# Patient Record
Sex: FEMALE | Race: WHITE | NOT HISPANIC OR LATINO | Employment: UNEMPLOYED | URBAN - METROPOLITAN AREA
[De-identification: names, ages, dates, MRNs, and addresses within clinical notes are randomized per-mention and may not be internally consistent; named-entity substitution may affect disease eponyms.]

---

## 2017-04-16 PROCEDURE — 87077 CULTURE AEROBIC IDENTIFY: CPT | Performed by: FAMILY MEDICINE

## 2017-04-16 PROCEDURE — 87186 SC STD MICRODIL/AGAR DIL: CPT | Performed by: FAMILY MEDICINE

## 2017-04-16 PROCEDURE — 87086 URINE CULTURE/COLONY COUNT: CPT | Performed by: FAMILY MEDICINE

## 2017-04-16 PROCEDURE — 87147 CULTURE TYPE IMMUNOLOGIC: CPT | Performed by: FAMILY MEDICINE

## 2017-04-19 ENCOUNTER — TELEPHONE (OUTPATIENT)
Dept: URGENT CARE | Facility: CLINIC | Age: 57
End: 2017-04-19

## 2017-04-19 NOTE — TELEPHONE ENCOUNTER
Pt called for results of urine culture.  Discussed culture with pt and informed that there was no C&S because growth was skin contaminate.  She reports that her doctor, Earlene Ortiz wanted to verify she was on the correct antibiotics. Offered for patient to return to [facility] for repeat UA and culture, she declined.   States she will call her doctor and have results sent to her.

## 2017-04-20 ENCOUNTER — TELEPHONE (OUTPATIENT)
Dept: URGENT CARE | Facility: CLINIC | Age: 57
End: 2017-04-20

## 2017-06-14 ENCOUNTER — OFFICE VISIT (OUTPATIENT)
Dept: INTERNAL MEDICINE | Facility: CLINIC | Age: 57
End: 2017-06-14

## 2017-06-14 VITALS
HEIGHT: 65 IN | RESPIRATION RATE: 16 BRPM | BODY MASS INDEX: 29.99 KG/M2 | HEART RATE: 70 BPM | DIASTOLIC BLOOD PRESSURE: 76 MMHG | WEIGHT: 180 LBS | TEMPERATURE: 97.2 F | SYSTOLIC BLOOD PRESSURE: 118 MMHG

## 2017-06-14 DIAGNOSIS — C50.912 MALIGNANT NEOPLASM OF LEFT FEMALE BREAST, UNSPECIFIED SITE OF BREAST: ICD-10-CM

## 2017-06-14 DIAGNOSIS — Z13.6 SCREENING FOR CARDIOVASCULAR CONDITION: ICD-10-CM

## 2017-06-14 DIAGNOSIS — J30.2 SEASONAL ALLERGIC RHINITIS, UNSPECIFIED ALLERGIC RHINITIS TRIGGER: ICD-10-CM

## 2017-06-14 DIAGNOSIS — I10 ESSENTIAL HYPERTENSION: ICD-10-CM

## 2017-06-14 DIAGNOSIS — K21.9 GASTROESOPHAGEAL REFLUX DISEASE WITHOUT ESOPHAGITIS: ICD-10-CM

## 2017-06-14 DIAGNOSIS — K63.5 BENIGN COLONIC POLYP: ICD-10-CM

## 2017-06-14 DIAGNOSIS — Z11.59 NEED FOR HEPATITIS C SCREENING TEST: ICD-10-CM

## 2017-06-14 DIAGNOSIS — Z00.00 ENCOUNTER FOR HEALTH MAINTENANCE EXAMINATION IN ADULT: Primary | ICD-10-CM

## 2017-06-14 DIAGNOSIS — K58.9 IRRITABLE BOWEL SYNDROME WITHOUT DIARRHEA: ICD-10-CM

## 2017-06-14 LAB
25(OH)D3 SERPL-MCNC: 25.3 NG/ML
ALBUMIN SERPL-MCNC: 4.8 G/DL (ref 3.2–4.8)
ALBUMIN/GLOB SERPL: 1.8 G/DL (ref 1.5–2.5)
ALP SERPL-CCNC: 57 U/L (ref 25–100)
ALT SERPL W P-5'-P-CCNC: 20 U/L (ref 7–40)
ANION GAP SERPL CALCULATED.3IONS-SCNC: 5 MMOL/L (ref 3–11)
ARTICHOKE IGE QN: 129 MG/DL (ref 0–130)
AST SERPL-CCNC: 33 U/L (ref 0–33)
BASOPHILS # BLD AUTO: 0.05 10*3/MM3 (ref 0–0.2)
BASOPHILS NFR BLD AUTO: 1 % (ref 0–1)
BILIRUB SERPL-MCNC: 0.6 MG/DL (ref 0.3–1.2)
BUN BLD-MCNC: 17 MG/DL (ref 9–23)
BUN/CREAT SERPL: 24.3 (ref 7–25)
CALCIUM SPEC-SCNC: 10.1 MG/DL (ref 8.7–10.4)
CHLORIDE SERPL-SCNC: 104 MMOL/L (ref 99–109)
CHOLEST SERPL-MCNC: 235 MG/DL (ref 0–200)
CLARITY, POC: ABNORMAL
CO2 SERPL-SCNC: 31 MMOL/L (ref 20–31)
COLOR UR: YELLOW
CREAT BLD-MCNC: 0.7 MG/DL (ref 0.6–1.3)
DEPRECATED RDW RBC AUTO: 46.4 FL (ref 37–54)
EOSINOPHIL # BLD AUTO: 0.16 10*3/MM3 (ref 0.1–0.3)
EOSINOPHIL NFR BLD AUTO: 3.1 % (ref 0–3)
ERYTHROCYTE [DISTWIDTH] IN BLOOD BY AUTOMATED COUNT: 13 % (ref 11.3–14.5)
EXPIRATION DATE: ABNORMAL
GFR SERPL CREATININE-BSD FRML MDRD: 87 ML/MIN/1.73
GLOBULIN UR ELPH-MCNC: 2.7 GM/DL
GLUCOSE BLD-MCNC: 114 MG/DL (ref 70–100)
GLUCOSE UR STRIP-MCNC: NEGATIVE MG/DL
HCT VFR BLD AUTO: 40.2 % (ref 34.5–44)
HCV AB SER DONR QL: NORMAL
HDLC SERPL-MCNC: 71 MG/DL (ref 40–60)
HGB BLD-MCNC: 13.1 G/DL (ref 11.5–15.5)
IMM GRANULOCYTES # BLD: 0.02 10*3/MM3 (ref 0–0.03)
IMM GRANULOCYTES NFR BLD: 0.4 % (ref 0–0.6)
KETONES UR QL: NEGATIVE
LEUKOCYTE EST, POC: ABNORMAL
LYMPHOCYTES # BLD AUTO: 1.19 10*3/MM3 (ref 0.6–4.8)
LYMPHOCYTES NFR BLD AUTO: 23.2 % (ref 24–44)
Lab: ABNORMAL
MCH RBC QN AUTO: 31.7 PG (ref 27–31)
MCHC RBC AUTO-ENTMCNC: 32.6 G/DL (ref 32–36)
MCV RBC AUTO: 97.3 FL (ref 80–99)
MONOCYTES # BLD AUTO: 0.4 10*3/MM3 (ref 0–1)
MONOCYTES NFR BLD AUTO: 7.8 % (ref 0–12)
NEUTROPHILS # BLD AUTO: 3.31 10*3/MM3 (ref 1.5–8.3)
NEUTROPHILS NFR BLD AUTO: 64.5 % (ref 41–71)
NITRITE UR-MCNC: NEGATIVE MG/ML
PH UR: 8.5 [PH] (ref 5–8)
PLAT MORPH BLD: NORMAL
PLATELET # BLD AUTO: 305 10*3/MM3 (ref 150–450)
PMV BLD AUTO: 11.2 FL (ref 6–12)
POTASSIUM BLD-SCNC: 5 MMOL/L (ref 3.5–5.5)
PROT SERPL-MCNC: 7.5 G/DL (ref 5.7–8.2)
PROT UR STRIP-MCNC: NEGATIVE MG/DL
PROT/CREAT UR: 200 MG/G CREA
RBC # BLD AUTO: 4.13 10*6/MM3 (ref 3.89–5.14)
RBC # UR STRIP: NEGATIVE /UL
RBC MORPH BLD: NORMAL
SODIUM BLD-SCNC: 140 MMOL/L (ref 132–146)
SP GR UR: 1.01 (ref 1–1.03)
TRIGL SERPL-MCNC: 168 MG/DL (ref 0–150)
TSH SERPL DL<=0.05 MIU/L-ACNC: 3 MIU/ML (ref 0.35–5.35)
WBC MORPH BLD: NORMAL
WBC NRBC COR # BLD: 5.13 10*3/MM3 (ref 3.5–10.8)

## 2017-06-14 PROCEDURE — 80053 COMPREHEN METABOLIC PANEL: CPT | Performed by: INTERNAL MEDICINE

## 2017-06-14 PROCEDURE — 80061 LIPID PANEL: CPT | Performed by: INTERNAL MEDICINE

## 2017-06-14 PROCEDURE — 86803 HEPATITIS C AB TEST: CPT | Performed by: INTERNAL MEDICINE

## 2017-06-14 PROCEDURE — 36415 COLL VENOUS BLD VENIPUNCTURE: CPT | Performed by: INTERNAL MEDICINE

## 2017-06-14 PROCEDURE — 82306 VITAMIN D 25 HYDROXY: CPT | Performed by: INTERNAL MEDICINE

## 2017-06-14 PROCEDURE — 84443 ASSAY THYROID STIM HORMONE: CPT | Performed by: INTERNAL MEDICINE

## 2017-06-14 PROCEDURE — 85025 COMPLETE CBC W/AUTO DIFF WBC: CPT | Performed by: INTERNAL MEDICINE

## 2017-06-14 PROCEDURE — 81002 URINALYSIS NONAUTO W/O SCOPE: CPT | Performed by: INTERNAL MEDICINE

## 2017-06-14 PROCEDURE — 85007 BL SMEAR W/DIFF WBC COUNT: CPT | Performed by: INTERNAL MEDICINE

## 2017-06-14 PROCEDURE — 99396 PREV VISIT EST AGE 40-64: CPT | Performed by: INTERNAL MEDICINE

## 2017-06-14 RX ORDER — LISINOPRIL 10 MG/1
10 TABLET ORAL DAILY
Qty: 90 TABLET | Refills: 3 | Status: SHIPPED | OUTPATIENT
Start: 2017-06-14 | End: 2018-06-25 | Stop reason: SDUPTHER

## 2017-06-14 RX ORDER — MONTELUKAST SODIUM 10 MG/1
10 TABLET ORAL DAILY
Qty: 90 TABLET | Refills: 3 | Status: SHIPPED | OUTPATIENT
Start: 2017-06-14 | End: 2018-06-27 | Stop reason: SDUPTHER

## 2017-06-14 RX ORDER — FLUOXETINE HYDROCHLORIDE 20 MG/1
20 CAPSULE ORAL DAILY
Qty: 90 EACH | Refills: 3 | Status: SHIPPED | OUTPATIENT
Start: 2017-06-14 | End: 2018-06-25 | Stop reason: SDUPTHER

## 2017-06-14 NOTE — PATIENT INSTRUCTIONS
Recommend continue PT, NSAIDS as needed, ice as needed, and start Glucosamine Chondroitin.    The patient was given an information brochure on Zostavax and will call back to have it done if covered by insurance.    The patient has been contacted by Dr. Lozano, and she agrees to schedule her Colonoscopy.    Health Maintenance, Female  Adopting a healthy lifestyle and getting preventive care can go a long way to promote health and wellness. Talk with your health care provider about what schedule of regular examinations is right for you. This is a good chance for you to check in with your provider about disease prevention and staying healthy.  In between checkups, there are plenty of things you can do on your own. Experts have done a lot of research about which lifestyle changes and preventive measures are most likely to keep you healthy. Ask your health care provider for more information.  WEIGHT AND DIET   Eat a healthy diet  · Be sure to include plenty of vegetables, fruits, low-fat dairy products, and lean protein.  · Do not eat a lot of foods high in solid fats, added sugars, or salt.  · Get regular exercise. This is one of the most important things you can do for your health.  ¨ Most adults should exercise for at least 150 minutes each week. The exercise should increase your heart rate and make you sweat (moderate-intensity exercise).  ¨ Most adults should also do strengthening exercises at least twice a week. This is in addition to the moderate-intensity exercise.    Maintain a healthy weight  · Body mass index (BMI) is a measurement that can be used to identify possible weight problems. It estimates body fat based on height and weight. Your health care provider can help determine your BMI and help you achieve or maintain a healthy weight.  · For females 20 years of age and older:      A BMI below 18.5 is considered underweight.    A BMI of 18.5 to 24.9 is normal.    A BMI of 25 to 29.9 is considered overweight.     A BMI of 30 and above is considered obese.    Watch levels of cholesterol and blood lipids  · You should start having your blood tested for lipids and cholesterol at 20 years of age, then have this test every 5 years.  · You may need to have your cholesterol levels checked more often if:  ¨ Your lipid or cholesterol levels are high.  ¨ You are older than 50 years of age.  ¨ You are at high risk for heart disease.    CANCER SCREENING      Lung Cancer  · Lung cancer screening is recommended for adults 55-80 years old who are at high risk for lung cancer because of a history of smoking.  · A yearly low-dose CT scan of the lungs is recommended for people who:  ¨ Currently smoke.  ¨ Have quit within the past 15 years.  ¨ Have at least a 30-pack-year history of smoking. A pack year is smoking an average of one pack of cigarettes a day for 1 year.  · Yearly screening should continue until it has been 15 years since you quit.  · Yearly screening should stop if you develop a health problem that would prevent you from having lung cancer treatment.    Breast Cancer  · Practice breast self-awareness. This means understanding how your breasts normally appear and feel.  · It also means doing regular breast self-exams. Let your health care provider know about any changes, no matter how small.  · If you are in your 20s or 30s, you should have a clinical breast exam (CBE) by a health care provider every 1-3 years as part of a regular health exam.  · If you are 40 or older, have a CBE every year. Also consider having a breast X-ray (mammogram) every year.  · If you have a family history of breast cancer, talk to your health care provider about genetic screening.  · If you are at high risk for breast cancer, talk to your health care provider about having an MRI and a mammogram every year.  · Breast cancer gene (BRCA) assessment is recommended for women who have family members with BRCA-related cancers. BRCA-related cancers  include:  ¨ Breast.  ¨ Ovarian.  ¨ Tubal.  ¨ Peritoneal cancers.  · Results of the assessment will determine the need for genetic counseling and BRCA1 and BRCA2 testing.  Cervical Cancer  Your health care provider may recommend that you be screened regularly for cancer of the pelvic organs (ovaries, uterus, and vagina). This screening involves a pelvic examination, including checking for microscopic changes to the surface of your cervix (Pap test). You may be encouraged to have this screening done every 3 years, beginning at age 21.  · For women ages 30-65, health care providers may recommend pelvic exams and Pap testing every 3 years, or they may recommend the Pap and pelvic exam, combined with testing for human papilloma virus (HPV), every 5 years. Some types of HPV increase your risk of cervical cancer. Testing for HPV may also be done on women of any age with unclear Pap test results.  · Other health care providers may not recommend any screening for nonpregnant women who are considered low risk for pelvic cancer and who do not have symptoms. Ask your health care provider if a screening pelvic exam is right for you.  · If you have had past treatment for cervical cancer or a condition that could lead to cancer, you need Pap tests and screening for cancer for at least 20 years after your treatment. If Pap tests have been discontinued, your risk factors (such as having a new sexual partner) need to be reassessed to determine if screening should resume. Some women have medical problems that increase the chance of getting cervical cancer. In these cases, your health care provider may recommend more frequent screening and Pap tests.  Colorectal Cancer  · This type of cancer can be detected and often prevented.  · Routine colorectal cancer screening usually begins at 50 years of age and continues through 75 years of age.  · Your health care provider may recommend screening at an earlier age if you have risk factors for  colon cancer.  · Your health care provider may also recommend using home test kits to check for hidden blood in the stool.  · A small camera at the end of a tube can be used to examine your colon directly (sigmoidoscopy or colonoscopy). This is done to check for the earliest forms of colorectal cancer.  · Routine screening usually begins at age 50.  · Direct examination of the colon should be repeated every 5-10 years through 75 years of age. However, you may need to be screened more often if early forms of precancerous polyps or small growths are found.  Skin Cancer  · Check your skin from head to toe regularly.  · Tell your health care provider about any new moles or changes in moles, especially if there is a change in a mole's shape or color.  · Also tell your health care provider if you have a mole that is larger than the size of a pencil eraser.  · Always use sunscreen. Apply sunscreen liberally and repeatedly throughout the day.  · Protect yourself by wearing long sleeves, pants, a wide-brimmed hat, and sunglasses whenever you are outside.  HEART DISEASE, DIABETES, AND HIGH BLOOD PRESSURE   · High blood pressure causes heart disease and increases the risk of stroke. High blood pressure is more likely to develop in:    People who have blood pressure in the high end of the normal range (130-139/85-89 mm Hg).    People who are overweight or obese.    People who are .  · If you are 18-39 years of age, have your blood pressure checked every 3-5 years. If you are 40 years of age or older, have your blood pressure checked every year. You should have your blood pressure measured twice--once when you are at a hospital or clinic, and once when you are not at a hospital or clinic. Record the average of the two measurements. To check your blood pressure when you are not at a hospital or clinic, you can use:    An automated blood pressure machine at a pharmacy.    A home blood pressure monitor.  · If you  are between 55 years and 79 years old, ask your health care provider if you should take aspirin to prevent strokes.  · Have regular diabetes screenings. This involves taking a blood sample to check your fasting blood sugar level.    If you are at a normal weight and have a low risk for diabetes, have this test once every three years after 45 years of age.    If you are overweight and have a high risk for diabetes, consider being tested at a younger age or more often.  PREVENTING INFECTION   Hepatitis B  · If you have a higher risk for hepatitis B, you should be screened for this virus. You are considered at high risk for hepatitis B if:    You were born in a country where hepatitis B is common. Ask your health care provider which countries are considered high risk.    Your parents were born in a high-risk country, and you have not been immunized against hepatitis B (hepatitis B vaccine).    You have HIV or AIDS.    You use needles to inject street drugs.    You live with someone who has hepatitis B.    You have had sex with someone who has hepatitis B.    You get hemodialysis treatment.    You take certain medicines for conditions, including cancer, organ transplantation, and autoimmune conditions.  Hepatitis C  · Blood testing is recommended for:  ¨ Everyone born from 1945 through 1965.  ¨ Anyone with known risk factors for hepatitis C.  Sexually transmitted infections (STIs)  · You should be screened for sexually transmitted infections (STIs) including gonorrhea and chlamydia if:  ¨ You are sexually active and are younger than 24 years of age.  ¨ You are older than 24 years of age and your health care provider tells you that you are at risk for this type of infection.  ¨ Your sexual activity has changed since you were last screened and you are at an increased risk for chlamydia or gonorrhea. Ask your health care provider if you are at risk.    If you do not have HIV, but are at risk, it may be recommended that you  take a prescription medicine daily to prevent HIV infection. This is called pre-exposure prophylaxis (PrEP). You are considered at risk if:    You are sexually active and do not regularly use condoms or know the HIV status of your partner(s).    You take drugs by injection.    You are sexually active with a partner who has HIV.  Talk with your health care provider about whether you are at high risk of being infected with HIV. If you choose to begin PrEP, you should first be tested for HIV. You should then be tested every 3 months for as long as you are taking PrEP.   PREGNANCY   · If you are premenopausal and you may become pregnant, ask your health care provider about preconception counseling.  · If you may become pregnant, take 400 to 800 micrograms (mcg) of folic acid every day.  · If you want to prevent pregnancy, talk to your health care provider about birth control (contraception).  OSTEOPOROSIS AND MENOPAUSE   · Osteoporosis is a disease in which the bones lose minerals and strength with aging. This can result in serious bone fractures. Your risk for osteoporosis can be identified using a bone density scan.  · If you are 65 years of age or older, or if you are at risk for osteoporosis and fractures, ask your health care provider if you should be screened.  · Ask your health care provider whether you should take a calcium or vitamin D supplement to lower your risk for osteoporosis.  · Menopause may have certain physical symptoms and risks.  · Hormone replacement therapy may reduce some of these symptoms and risks.  Talk to your health care provider about whether hormone replacement therapy is right for you.   HOME CARE INSTRUCTIONS   · Schedule regular health, dental, and eye exams.  · Stay current with your immunizations.    · Do not use any tobacco products including cigarettes, chewing tobacco, or electronic cigarettes.  · If you are pregnant, do not drink alcohol.  · If you are breastfeeding, limit how  much and how often you drink alcohol.  · Limit alcohol intake to no more than 1 drink per day for nonpregnant women. One drink equals 12 ounces of beer, 5 ounces of wine, or 1½ ounces of hard liquor.  · Do not use street drugs.  · Do not share needles.  · Ask your health care provider for help if you need support or information about quitting drugs.  · Tell your health care provider if you often feel depressed.  · Tell your health care provider if you have ever been abused or do not feel safe at home.     This information is not intended to replace advice given to you by your health care provider. Make sure you discuss any questions you have with your health care provider.     Document Released: 07/02/2012 Document Revised: 01/08/2016 Document Reviewed: 11/19/2014  [employer] Patient Education ©2017 [employer]

## 2017-06-14 NOTE — PROGRESS NOTES
Subjective   History of Present Illness    History obtained from the patient.    The patient saw her oncologist, Dr. Ortiz,  Every 6 months, last in April 2016,  for her left breast cancer. She is not currently on medication. Last Mammogram was 4/10/17.      Primary Care Cardiac Diagnostic Constellation: The patient is here today for a follow-up visit.      Her Hypertension is stable.   Medication(s): Lisinopril and Aspirin.   The patient is adherent with her medication regimen. She denies medication side effects.      Interval Events: The patient does not check her blood pressure at home.     Symptoms: Denies chest pain, denies intermittent leg claudication, denies dyspnea, denies lower extremity edema, denies exercise intolerance, denies fatigue, denies visual impairment, denies muscle pain and denies muscle weakness.   Associated symptoms include recent no recent  weight changes, no palpitations, no syncope, no headache, no orthopnea, no PND, no polydipsia, no polyuria, no focal neurologic deficits, no memory loss and no recent weight loss.      Lifestyle and Disease Management: Diet: She consumes a diverse and healthy diet. Weight Issues: She has weight concerns. Exercise: She exercises regularly. She exercises 5 times per week. Exercise includes walking and yard work.   Smoking: She does not use tobacco.       Gastroesophageal Reflux Disease (Brief): The patient is being seen for a routine clinic follow-up of Gastroesophageal Reflux Disease.   Symptoms: no heartburn, no abdominal pain, no acid regurgitation, no nausea, no vomiting, no sore throat, no dysphagia, no odynophagia, no hematemesis and no melena.   Associated symptoms: no anorexia, no early satiety, no bloating, no belching, no hoarseness, no cough and no wheezing.   Current treatment:  Omeprazole every other day, rarely adds Zantac for GERD and Prozac for IBS.   By report, there is good symptom control.         Colonic Polyp (Brief): The  patient is being seen for a routine clinic follow-up of colon polyp(s).   Current diagnosis was determined by colonoscopy and last 4/13/11- 4  polyps.   Symptoms: Stable constipation, but no melena, no hematochezia, no diarrhea,  no decreased stool caliber, no change in bowel habits and no abdominal pain    Pertinent Medical History: anal fissure.   Associated symptoms: no rectal prolapse.   Current treatment includes Align and 2 stool softeners.  Takes Miralax occasionally. By report, there is good symptom control.       Ksenia Ratliff is a 56 y.o. female who presents for an Annual Physical.      PMH, PSH, SocHx, FamHx, Allergies, and Medications: Reviewed and updated.    Outpatient Medications Prior to Visit   Medication Sig Dispense Refill   • aspirin 81 MG tablet Take 3 tablets by mouth daily.     • FLUoxetine HCl, PMDD, 20 MG capsule Take 20 mg by mouth daily. 90 each 3   • fluticasone (FLONASE) 50 MCG/ACT nasal spray into each nostril daily.     • lisinopril (PRINIVIL,ZESTRIL) 10 MG tablet Take 1 tablet by mouth daily. 90 tablet 3   • loratadine-pseudoephedrine (CLARITIN-D 12 HOUR) 5-120 MG per 12 hr tablet Take  by mouth.     • montelukast (SINGULAIR) 10 MG tablet TAKE ONE TABLET BY MOUTH DAILY AS NEEDED FOR ALLERGIES 30 tablet 9   • Multiple Vitamins-Minerals (MULTI VITAMIN/MINERALS PO) Take  by mouth daily.     • Probiotic Product (ALIGN) capsule Take  by mouth.     • ranitidine (ZANTAC) 150 MG tablet Take 150 mg by mouth daily. AS NEEDED       No facility-administered medications prior to visit.        Immunization History   Administered Date(s) Administered   • Influenza, Quadrivalent 01/10/2016   • Tdap 03/21/2011         Patient Active Problem List   Diagnosis   • Malignant neoplasm of breast   • Lung nodule, solitary   • Allergic rhinitis   • Anemia   • Benign colonic polyp   • Gastroesophageal reflux disease   • Hypertension   • Irritable bowel syndrome       Health Habits:  Dental Exam. up to  date  Eye Exam. up to date  Hearing Loss:  No  Exercise: 5 times/week.  Current exercise activities include: walking and yard work  Diet: Healthy  Multivitamin: Yes    Safe Driving:  Yes  Seat Belt:  Yes  Bike Helmet:  N/A  Skin Screening:  Yes  Sunscreen: Yes  SBE / COLE: occasional, but checked by Dr. Ortiz Q6 months and Dr. Lara Q 1 year  Sexual Activity:  Yes  Birth Control:  Menopause  STD Prevention:  No    Last Pap: 8/1/15, normal per patient.  She states she also had a normal pelvic exam by GYN in June 2017.   Last Mammogram:  4/10/17, normal per patient  Last DEXA Scan: None, but has one scheduled by Dr. Lara for next month.  Last Colonoscopy: 4/13/11- 4 cecal polyps, focal acute colitis.  Last PSA: N/A    Social:    Social History     Social History   • Marital status:      Spouse name: N/A   • Number of children: 2   • Years of education: N/A     Occupational History   • Retired     Social History Main Topics   • Smoking status: Former Smoker (1/2 ppd x 4-6 years in high school)   • Smokeless tobacco: Never Used   • Alcohol use Yes      Comment: 1-3 glasses of wine daily   • Drug use: No   • Sexual activity: Not on file     Other Topics Concern   • Not on file     Social History Narrative         Current Medical Providers:    Eun Mcmanus MD (Internal Medicine / Pediatrics)    The [facility] providers who are involved in the care of this patient are listed above.         Review of Systems   Constitutional: Negative for chills, fatigue, fever and unexpected weight change.        No weight gain or weight loss.  No night sweats.  No generalized pain.   HENT: Positive for congestion (mild) and rhinorrhea (clear in am). Negative for ear pain, hearing loss, nosebleeds, postnasal drip, sinus pressure, sneezing, sore throat, tinnitus and voice change.         Reports snoring.   Eyes: Positive for discharge (watery). Negative for photophobia, pain, redness, itching and visual disturbance.    Respiratory: Negative for cough, chest tightness, shortness of breath, wheezing and stridor.         No orthopnea, dyspnea on exertion, or PND.  No chest congestion.   No  hemoptysis.   Cardiovascular: Negative for chest pain, palpitations and leg swelling.        No claudication or syncope.   Gastrointestinal: Positive for constipation. Negative for abdominal pain, blood in stool, diarrhea, nausea, rectal pain and vomiting.        No hematemesis.  No heartburn, dysphagia or odynophagia.  No belching or bloating.  No melena.   Endocrine: Negative for cold intolerance, heat intolerance, polydipsia, polyphagia and polyuria.        No hair loss or dry skin.  No generalized weakness.  Has hot flashes.   Genitourinary: Negative for difficulty urinating, dyspareunia, dysuria, flank pain, frequency, hematuria, menstrual problem, pelvic pain, urgency, vaginal bleeding and vaginal discharge.        No nocturia, incomplete emptying, or incontinence.   Musculoskeletal: Negative for arthralgias, back pain, gait problem, joint swelling, myalgias, neck pain and neck stiffness.        No joint stiffness.  Complains of bilateral knee pain with squatting, kneeling, and bending.  Has been going to PT, and it is improving.   Skin: Negative for rash.        No new skin lesions or changes in skin lesions. No breast pain or masses.  No nipple discharge or nipple inversion.   Neurological: Negative for dizziness, tremors, syncope, speech difficulty, weakness, light-headedness, numbness and headaches.        No tingling.  No memory loss.  No decreased concentration.   Hematological: Negative for adenopathy. Does not bruise/bleed easily.   Psychiatric/Behavioral: Positive for sleep disturbance (Advil PM helps.). Negative for confusion and suicidal ideas. The patient is not nervous/anxious.         No depression.           Objective     Vitals:    06/14/17 0902   BP: 118/76   BP Location: Right arm   Patient Position: Sitting   Pulse: 70  "  Resp: 16   Temp: 97.2 °F (36.2 °C)   TempSrc: Temporal Artery    Weight: 180 lb (81.6 kg)   Height: 64.5\" (163.8 cm)       Body mass index is 30.42 kg/(m^2).    Physical Exam   Constitutional:   Borderline obese.   HENT:   Head: Normocephalic and atraumatic.   Right Ear: Tympanic membrane, external ear and ear canal normal.   Left Ear: Tympanic membrane, external ear and ear canal normal.   Mouth/Throat: Oropharynx is clear and moist. No oropharyngeal exudate.   Eyes: Conjunctivae and EOM are normal. Pupils are equal, round, and reactive to light.   Neck: Normal range of motion. Neck supple. No thyromegaly present.   Cardiovascular: Normal rate, regular rhythm and intact distal pulses.  Exam reveals no gallop and no friction rub.    No murmur heard.  Pulmonary/Chest: Effort normal and breath sounds normal.   Abdominal: Soft. Bowel sounds are normal. She exhibits no distension and no mass. There is no hepatomegaly. There is no tenderness. There is no rebound and no guarding.   Rectal exam deferred.   Genitourinary: Rectum normal.   Genitourinary Comments:  deferred.   Musculoskeletal: Normal range of motion. She exhibits no edema or tenderness.   Lymphadenopathy:     She has no cervical adenopathy.        Right cervical: No posterior cervical adenopathy present.       Left cervical: No posterior cervical adenopathy present.        Right: No inguinal and no supraclavicular adenopathy present.        Left: No inguinal and no supraclavicular adenopathy present.   Neurological: She is alert. She has normal strength and normal reflexes. She displays normal reflexes. No cranial nerve deficit. She exhibits normal muscle tone. Coordination and gait normal.   Skin: No lesion and no rash noted.   No atypical skin lesions.   Psychiatric: She has a normal mood and affect.   Nursing note and vitals reviewed.      Results for orders placed or performed in visit on 06/14/17   POC Urinalysis Dipstick, Multipro   Result Value Ref " Range    Color Yellow Yellow, Straw, Dark Yellow, Dede    Clarity, UA Hazy (A) Clear    Glucose, UA Negative Negative, 1000 mg/dL (3+) mg/dL    Ketones, UA Negative Negative    Specific Gravity  1.015 1.005 - 1.030    Blood, UA Negative Negative    pH, Urine 8.5 (A) 5.0 - 8.0    Protein, POC Negative Negative mg/dL    Leukocytes Trace (A) Negative    Nitrite, UA Negative Negative    Protein/Creatinine Ratio, Urine 200.0 mg/G Crea    Lot Number [id number]     Expiration Date 10-17          Assessment/Plan      Ksenia was seen today for annual exam.    Diagnoses and all orders for this visit:    Encounter for health maintenance examination in adult    Essential hypertension  -     CBC & Differential  -     TSH  -     POC Urinalysis Dipstick, Multipro  -     Lipid Panel  -     Comprehensive Metabolic Panel  -     lisinopril (PRINIVIL,ZESTRIL) 10 MG tablet; Take 1 tablet by mouth Daily.  -     CBC Auto Differential  -     Scan Slide; Future  -     Scan Slide    Gastroesophageal reflux disease without esophagitis    Benign colonic polyp    Irritable bowel syndrome without diarrhea  -     FLUoxetine HCl, PMDD, 20 MG capsule; Take 20 mg by mouth Daily.    Malignant neoplasm of left female breast, unspecified site of breast    Seasonal allergic rhinitis, unspecified allergic rhinitis trigger  -     montelukast (SINGULAIR) 10 MG tablet; Take 1 tablet by mouth Daily.  -     loratadine-pseudoephedrine (CLARITIN-D 12 HOUR) 5-120 MG per 12 hr tablet; Take 1 tablet by mouth 2 (Two) Times a Day.    Need for hepatitis C screening test  -     Hepatitis C Antibody    Screening for cardiovascular condition  -     Vitamin D 25 Hydroxy      Recommend continue PT, NSAIDS as needed, ice as needed, and start Glucosamine Chondroitin.    The patient was given an information brochure on Zostavax and will call back to have it done if covered by insurance.    The patient has been contacted by Dr. Lozano, and she agrees to schedule her  Colonoscopy.      Return in about 1 year (around 6/14/2018) for Annual physical, fasting.

## 2017-06-15 PROBLEM — N95.1 MENOPAUSAL STATE: Status: ACTIVE | Noted: 2017-06-15

## 2017-07-27 ENCOUNTER — TELEPHONE (OUTPATIENT)
Dept: INTERNAL MEDICINE | Facility: CLINIC | Age: 57
End: 2017-07-27

## 2017-07-27 DIAGNOSIS — E04.9 THYROID ENLARGED: Primary | ICD-10-CM

## 2017-07-27 NOTE — TELEPHONE ENCOUNTER
Call patient please.  I received a note from her dermatologist indicating she felt an enlargement of the left side of the thyroid gland.  I did not appreciate any abnormality of the thyroid at her last physical, but would recommend a thyroid ultrasound to make sure there is no problem.  If she is agreeable, I will enter an order.

## 2017-09-15 ENCOUNTER — HOSPITAL ENCOUNTER (OUTPATIENT)
Dept: ULTRASOUND IMAGING | Facility: HOSPITAL | Age: 57
Discharge: HOME | End: 2017-09-15
Attending: INTERNAL MEDICINE | Admitting: INTERNAL MEDICINE

## 2017-09-15 DIAGNOSIS — E04.9 THYROID ENLARGED: ICD-10-CM

## 2017-09-15 PROCEDURE — 76536 US EXAM OF HEAD AND NECK: CPT

## 2018-03-23 ENCOUNTER — TELEPHONE (OUTPATIENT)
Dept: INTERNAL MEDICINE | Facility: CLINIC | Age: 58
End: 2018-03-23

## 2018-03-23 DIAGNOSIS — J30.89 CHRONIC ALLERGIC RHINITIS DUE TO OTHER ALLERGIC TRIGGER, UNSPECIFIED SEASONALITY: Primary | ICD-10-CM

## 2018-03-23 NOTE — TELEPHONE ENCOUNTER
----- Message from Jalyn Obrien sent at 3/23/2018  2:45 PM EDT -----  PATIENT IS NEEDING loratadine-pseudoephedrine (CLARITIN-D 12 HOUR) 5-120 MG per 12 hr tablet CALLED IN    PHARMACY: [facility] [address] [place] [address] [place] [facility] [address]  - [phone] [facility] [phone]     PATIENT CALL BACK: [phone]    THANK YOU

## 2018-06-25 DIAGNOSIS — I10 ESSENTIAL HYPERTENSION: ICD-10-CM

## 2018-06-25 DIAGNOSIS — K58.9 IRRITABLE BOWEL SYNDROME WITHOUT DIARRHEA: ICD-10-CM

## 2018-06-25 RX ORDER — LISINOPRIL 10 MG/1
TABLET ORAL
Qty: 30 TABLET | Refills: 2 | Status: SHIPPED | OUTPATIENT
Start: 2018-06-25 | End: 2018-06-27 | Stop reason: SDUPTHER

## 2018-06-25 RX ORDER — FLUOXETINE HYDROCHLORIDE 20 MG/1
CAPSULE ORAL
Qty: 30 CAPSULE | Refills: 2 | Status: SHIPPED | OUTPATIENT
Start: 2018-06-25 | End: 2018-06-27 | Stop reason: SDUPTHER

## 2018-06-27 ENCOUNTER — OFFICE VISIT (OUTPATIENT)
Dept: INTERNAL MEDICINE | Facility: CLINIC | Age: 58
End: 2018-06-27

## 2018-06-27 VITALS
DIASTOLIC BLOOD PRESSURE: 78 MMHG | WEIGHT: 187 LBS | BODY MASS INDEX: 31.6 KG/M2 | HEART RATE: 72 BPM | SYSTOLIC BLOOD PRESSURE: 120 MMHG | TEMPERATURE: 96.8 F | RESPIRATION RATE: 20 BRPM

## 2018-06-27 DIAGNOSIS — Z79.899 HIGH RISK MEDICATION USE: ICD-10-CM

## 2018-06-27 DIAGNOSIS — K21.9 GASTROESOPHAGEAL REFLUX DISEASE WITHOUT ESOPHAGITIS: ICD-10-CM

## 2018-06-27 DIAGNOSIS — K63.5 BENIGN COLONIC POLYP: ICD-10-CM

## 2018-06-27 DIAGNOSIS — R73.9 HYPERGLYCEMIA: ICD-10-CM

## 2018-06-27 DIAGNOSIS — I10 ESSENTIAL HYPERTENSION: ICD-10-CM

## 2018-06-27 DIAGNOSIS — Z00.00 ENCOUNTER FOR HEALTH MAINTENANCE EXAMINATION IN ADULT: Primary | ICD-10-CM

## 2018-06-27 DIAGNOSIS — K58.9 IRRITABLE BOWEL SYNDROME WITHOUT DIARRHEA: ICD-10-CM

## 2018-06-27 LAB
25(OH)D3 SERPL-MCNC: 29.8 NG/ML
ALBUMIN SERPL-MCNC: 4.77 G/DL (ref 3.2–4.8)
ALBUMIN/GLOB SERPL: 2.1 G/DL (ref 1.5–2.5)
ALP SERPL-CCNC: 54 U/L (ref 25–100)
ALT SERPL W P-5'-P-CCNC: 27 U/L (ref 7–40)
ANION GAP SERPL CALCULATED.3IONS-SCNC: 6 MMOL/L (ref 3–11)
ARTICHOKE IGE QN: 145 MG/DL (ref 0–130)
AST SERPL-CCNC: 20 U/L (ref 0–33)
BASOPHILS # BLD AUTO: 0.06 10*3/MM3 (ref 0–0.2)
BASOPHILS NFR BLD AUTO: 1.2 % (ref 0–1)
BILIRUB SERPL-MCNC: 0.3 MG/DL (ref 0.3–1.2)
BUN BLD-MCNC: 16 MG/DL (ref 9–23)
BUN/CREAT SERPL: 25 (ref 7–25)
CALCIUM SPEC-SCNC: 9.2 MG/DL (ref 8.7–10.4)
CHLORIDE SERPL-SCNC: 107 MMOL/L (ref 99–109)
CHOLEST SERPL-MCNC: 233 MG/DL (ref 0–200)
CLARITY, POC: CLEAR
CO2 SERPL-SCNC: 30 MMOL/L (ref 20–31)
COLOR UR: YELLOW
CREAT BLD-MCNC: 0.64 MG/DL (ref 0.6–1.3)
DEPRECATED RDW RBC AUTO: 47.2 FL (ref 37–54)
EOSINOPHIL # BLD AUTO: 0.19 10*3/MM3 (ref 0–0.3)
EOSINOPHIL NFR BLD AUTO: 3.7 % (ref 0–3)
ERYTHROCYTE [DISTWIDTH] IN BLOOD BY AUTOMATED COUNT: 13.2 % (ref 11.3–14.5)
EXPIRATION DATE: ABNORMAL
EXPIRATION DATE: NORMAL
GFR SERPL CREATININE-BSD FRML MDRD: 96 ML/MIN/1.73
GLOBULIN UR ELPH-MCNC: 2.2 GM/DL
GLUCOSE BLD-MCNC: 108 MG/DL (ref 70–100)
GLUCOSE UR STRIP-MCNC: NEGATIVE MG/DL
HBA1C MFR BLD: 5.4 %
HCT VFR BLD AUTO: 40.5 % (ref 34.5–44)
HDLC SERPL-MCNC: 70 MG/DL (ref 40–60)
HGB BLD-MCNC: 13 G/DL (ref 11.5–15.5)
IMM GRANULOCYTES # BLD: 0.01 10*3/MM3 (ref 0–0.03)
IMM GRANULOCYTES NFR BLD: 0.2 % (ref 0–0.6)
KETONES UR QL: NEGATIVE
LEUKOCYTE EST, POC: NEGATIVE
LYMPHOCYTES # BLD AUTO: 1.56 10*3/MM3 (ref 0.6–4.8)
LYMPHOCYTES NFR BLD AUTO: 30.6 % (ref 24–44)
Lab: ABNORMAL
Lab: NORMAL
MAGNESIUM SERPL-MCNC: 2.2 MG/DL (ref 1.3–2.7)
MCH RBC QN AUTO: 31.4 PG (ref 27–31)
MCHC RBC AUTO-ENTMCNC: 32.1 G/DL (ref 32–36)
MCV RBC AUTO: 97.8 FL (ref 80–99)
MONOCYTES # BLD AUTO: 0.38 10*3/MM3 (ref 0–1)
MONOCYTES NFR BLD AUTO: 7.5 % (ref 0–12)
NEUTROPHILS # BLD AUTO: 2.9 10*3/MM3 (ref 1.5–8.3)
NEUTROPHILS NFR BLD AUTO: 57 % (ref 41–71)
NITRITE UR-MCNC: NEGATIVE MG/ML
PH UR: 5.5 [PH] (ref 5–8)
PLATELET # BLD AUTO: 337 10*3/MM3 (ref 150–450)
PMV BLD AUTO: 10.6 FL (ref 6–12)
POTASSIUM BLD-SCNC: 4.5 MMOL/L (ref 3.5–5.5)
PROT SERPL-MCNC: 7 G/DL (ref 5.7–8.2)
PROT UR STRIP-MCNC: NEGATIVE MG/DL
PROT/CREAT UR: 200 MG/G CREA
RBC # BLD AUTO: 4.14 10*6/MM3 (ref 3.89–5.14)
RBC # UR STRIP: ABNORMAL /UL
SODIUM BLD-SCNC: 143 MMOL/L (ref 132–146)
SP GR UR: 1.02 (ref 1–1.03)
TRIGL SERPL-MCNC: 169 MG/DL (ref 0–150)
TSH SERPL DL<=0.05 MIU/L-ACNC: 2.04 MIU/ML (ref 0.35–5.35)
WBC NRBC COR # BLD: 5.09 10*3/MM3 (ref 3.5–10.8)

## 2018-06-27 PROCEDURE — 81002 URINALYSIS NONAUTO W/O SCOPE: CPT | Performed by: INTERNAL MEDICINE

## 2018-06-27 PROCEDURE — 36415 COLL VENOUS BLD VENIPUNCTURE: CPT | Performed by: INTERNAL MEDICINE

## 2018-06-27 PROCEDURE — 80061 LIPID PANEL: CPT | Performed by: INTERNAL MEDICINE

## 2018-06-27 PROCEDURE — 84443 ASSAY THYROID STIM HORMONE: CPT | Performed by: INTERNAL MEDICINE

## 2018-06-27 PROCEDURE — 82306 VITAMIN D 25 HYDROXY: CPT | Performed by: INTERNAL MEDICINE

## 2018-06-27 PROCEDURE — 80053 COMPREHEN METABOLIC PANEL: CPT | Performed by: INTERNAL MEDICINE

## 2018-06-27 PROCEDURE — 83036 HEMOGLOBIN GLYCOSYLATED A1C: CPT | Performed by: INTERNAL MEDICINE

## 2018-06-27 PROCEDURE — 83735 ASSAY OF MAGNESIUM: CPT | Performed by: INTERNAL MEDICINE

## 2018-06-27 PROCEDURE — 99396 PREV VISIT EST AGE 40-64: CPT | Performed by: INTERNAL MEDICINE

## 2018-06-27 PROCEDURE — 85025 COMPLETE CBC W/AUTO DIFF WBC: CPT | Performed by: INTERNAL MEDICINE

## 2018-06-27 RX ORDER — OMEPRAZOLE 20 MG/1
1 CAPSULE, DELAYED RELEASE ORAL DAILY
COMMUNITY
End: 2021-04-09

## 2018-06-27 RX ORDER — OCTISALATE, AVOBENZONE, HOMOSALATE, AND OCTOCRYLENE 29.4; 29.4; 49; 25.48 MG/ML; MG/ML; MG/ML; MG/ML
1 LOTION TOPICAL DAILY
COMMUNITY

## 2018-06-27 RX ORDER — LISINOPRIL 10 MG/1
10 TABLET ORAL DAILY
Qty: 90 TABLET | Refills: 1 | Status: SHIPPED | OUTPATIENT
Start: 2018-06-27 | End: 2018-12-19

## 2018-06-27 RX ORDER — FLUOXETINE HYDROCHLORIDE 20 MG/1
20 CAPSULE ORAL DAILY
Qty: 90 CAPSULE | Refills: 1 | Status: SHIPPED | OUTPATIENT
Start: 2018-06-27 | End: 2019-06-02 | Stop reason: SDUPTHER

## 2018-06-27 RX ORDER — MONTELUKAST SODIUM 10 MG/1
10 TABLET ORAL DAILY
Qty: 90 TABLET | Refills: 1 | Status: SHIPPED | OUTPATIENT
Start: 2018-06-27 | End: 2019-04-01 | Stop reason: SDUPTHER

## 2018-06-27 NOTE — PATIENT INSTRUCTIONS
Recommend Shingrix *new Shingles vaccine) at the pharmacy.    For the elbow pain, recommend NSAIDS, ice, and a tennis elbow brace.  If no better after 2 weeks, would recommend physical therapy.        Health Maintenance for Postmenopausal Women  Menopause is a normal process in which your reproductive ability comes to an end. This process happens gradually over a span of months to years, usually between the ages of 48 and 55. Menopause is complete when you have missed 12 consecutive menstrual periods.  It is important to talk with your health care provider about some of the most common conditions that affect postmenopausal women, such as heart disease, cancer, and bone loss (osteoporosis). Adopting a healthy lifestyle and getting preventive care can help to promote your health and wellness. Those actions can also lower your chances of developing some of these common conditions.  What should I know about menopause?  During menopause, you may experience a number of symptoms, such as:  · Moderate-to-severe hot flashes.  · Night sweats.  · Decrease in sex drive.  · Mood swings.  · Headaches.  · Tiredness.  · Irritability.  · Memory problems.  · Insomnia.    Choosing to treat or not to treat menopausal changes is an individual decision that you make with your health care provider.  What should I know about hormone replacement therapy and supplements?  Hormone therapy products are effective for treating symptoms that are associated with menopause, such as hot flashes and night sweats. Hormone replacement carries certain risks, especially as you become older. If you are thinking about using estrogen or estrogen with progestin treatments, discuss the benefits and risks with your health care provider.  What should I know about heart disease and stroke?  Heart disease, heart attack, and stroke become more likely as you age. This may be due, in part, to the hormonal changes that your body experiences during menopause. These  can affect how your body processes dietary fats, triglycerides, and cholesterol. Heart attack and stroke are both medical emergencies.  There are many things that you can do to help prevent heart disease and stroke:  · Have your blood pressure checked at least every 1-2 years. High blood pressure causes heart disease and increases the risk of stroke.  · If you are 55-79 years old, ask your health care provider if you should take aspirin to prevent a heart attack or a stroke.  · Do not use any tobacco products, including cigarettes, chewing tobacco, or electronic cigarettes. If you need help quitting, ask your health care provider.  · It is important to eat a healthy diet and maintain a healthy weight.  ? Be sure to include plenty of vegetables, fruits, low-fat dairy products, and lean protein.  ? Avoid eating foods that are high in solid fats, added sugars, or salt (sodium).  · Get regular exercise. This is one of the most important things that you can do for your health.  ? Try to exercise for at least 150 minutes each week. The type of exercise that you do should increase your heart rate and make you sweat. This is known as moderate-intensity exercise.  ? Try to do strengthening exercises at least twice each week. Do these in addition to the moderate-intensity exercise.  · Know your numbers. Ask your health care provider to check your cholesterol and your blood glucose. Continue to have your blood tested as directed by your health care provider.    What should I know about cancer screening?  There are several types of cancer. Take the following steps to reduce your risk and to catch any cancer development as early as possible.  Breast Cancer  · Practice breast self-awareness.  ? This means understanding how your breasts normally appear and feel.  ? It also means doing regular breast self-exams. Let your health care provider know about any changes, no matter how small.  · If you are 40 or older, have a clinician do  a breast exam (clinical breast exam or CBE) every year. Depending on your age, family history, and medical history, it may be recommended that you also have a yearly breast X-ray (mammogram).  · If you have a family history of breast cancer, talk with your health care provider about genetic screening.  · If you are at high risk for breast cancer, talk with your health care provider about having an MRI and a mammogram every year.  · Breast cancer (BRCA) gene test is recommended for women who have family members with BRCA-related cancers. Results of the assessment will determine the need for genetic counseling and BRCA1 and for BRCA2 testing. BRCA-related cancers include these types:  ? Breast. This occurs in males or females.  ? Ovarian.  ? Tubal. This may also be called fallopian tube cancer.  ? Cancer of the abdominal or pelvic lining (peritoneal cancer).  ? Prostate.  ? Pancreatic.    Cervical, Uterine, and Ovarian Cancer  Your health care provider may recommend that you be screened regularly for cancer of the pelvic organs. These include your ovaries, uterus, and vagina. This screening involves a pelvic exam, which includes checking for microscopic changes to the surface of your cervix (Pap test).  · For women ages 21-65, health care providers may recommend a pelvic exam and a Pap test every three years. For women ages 30-65, they may recommend the Pap test and pelvic exam, combined with testing for human papilloma virus (HPV), every five years. Some types of HPV increase your risk of cervical cancer. Testing for HPV may also be done on women of any age who have unclear Pap test results.  · Other health care providers may not recommend any screening for nonpregnant women who are considered low risk for pelvic cancer and have no symptoms. Ask your health care provider if a screening pelvic exam is right for you.  · If you have had past treatment for cervical cancer or a condition that could lead to cancer, you  need Pap tests and screening for cancer for at least 20 years after your treatment. If Pap tests have been discontinued for you, your risk factors (such as having a new sexual partner) need to be reassessed to determine if you should start having screenings again. Some women have medical problems that increase the chance of getting cervical cancer. In these cases, your health care provider may recommend that you have screening and Pap tests more often.  · If you have a family history of uterine cancer or ovarian cancer, talk with your health care provider about genetic screening.  · If you have vaginal bleeding after reaching menopause, tell your health care provider.  · There are currently no reliable tests available to screen for ovarian cancer.    Lung Cancer  Lung cancer screening is recommended for adults 55-80 years old who are at high risk for lung cancer because of a history of smoking. A yearly low-dose CT scan of the lungs is recommended if you:  · Currently smoke.  · Have a history of at least 30 pack-years of smoking and you currently smoke or have quit within the past 15 years. A pack-year is smoking an average of one pack of cigarettes per day for one year.    Yearly screening should:  · Continue until it has been 15 years since you quit.  · Stop if you develop a health problem that would prevent you from having lung cancer treatment.    Colorectal Cancer  · This type of cancer can be detected and can often be prevented.  · Routine colorectal cancer screening usually begins at age 50 and continues through age 75.  · If you have risk factors for colon cancer, your health care provider may recommend that you be screened at an earlier age.  · If you have a family history of colorectal cancer, talk with your health care provider about genetic screening.  · Your health care provider may also recommend using home test kits to check for hidden blood in your stool.  · A small camera at the end of a tube can be  used to examine your colon directly (sigmoidoscopy or colonoscopy). This is done to check for the earliest forms of colorectal cancer.  · Direct examination of the colon should be repeated every 5-10 years until age 75. However, if early forms of precancerous polyps or small growths are found or if you have a family history or genetic risk for colorectal cancer, you may need to be screened more often.    Skin Cancer  · Check your skin from head to toe regularly.  · Monitor any moles. Be sure to tell your health care provider:  ? About any new moles or changes in moles, especially if there is a change in a mole's shape or color.  ? If you have a mole that is larger than the size of a pencil eraser.  · If any of your family members has a history of skin cancer, especially at a young age, talk with your health care provider about genetic screening.  · Always use sunscreen. Apply sunscreen liberally and repeatedly throughout the day.  · Whenever you are outside, protect yourself by wearing long sleeves, pants, a wide-brimmed hat, and sunglasses.    What should I know about osteoporosis?  Osteoporosis is a condition in which bone destruction happens more quickly than new bone creation. After menopause, you may be at an increased risk for osteoporosis. To help prevent osteoporosis or the bone fractures that can happen because of osteoporosis, the following is recommended:  · If you are 19-50 years old, get at least 1,000 mg of calcium and at least 600 mg of vitamin D per day.  · If you are older than age 50 but younger than age 70, get at least 1,200 mg of calcium and at least 600 mg of vitamin D per day.  · If you are older than age 70, get at least 1,200 mg of calcium and at least 800 mg of vitamin D per day.    Smoking and excessive alcohol intake increase the risk of osteoporosis. Eat foods that are rich in calcium and vitamin D, and do weight-bearing exercises several times each week as directed by your health care  provider.  What should I know about how menopause affects my mental health?  Depression may occur at any age, but it is more common as you become older. Common symptoms of depression include:  · Low or sad mood.  · Changes in sleep patterns.  · Changes in appetite or eating patterns.  · Feeling an overall lack of motivation or enjoyment of activities that you previously enjoyed.  · Frequent crying spells.    Talk with your health care provider if you think that you are experiencing depression.  What should I know about immunizations?  It is important that you get and maintain your immunizations. These include:  · Tetanus, diphtheria, and pertussis (Tdap) booster vaccine.  · Influenza every year before the flu season begins.  · Pneumonia vaccine.  · Shingles vaccine.    Your health care provider may also recommend other immunizations.  This information is not intended to replace advice given to you by your health care provider. Make sure you discuss any questions you have with your health care provider.  Document Released: 02/09/2007 Document Revised: 07/07/2017 Document Reviewed: 09/20/2016  Elsevier Interactive Patient Education © 2018 [employer]

## 2018-09-26 DIAGNOSIS — K58.9 IRRITABLE BOWEL SYNDROME WITHOUT DIARRHEA: ICD-10-CM

## 2018-09-26 DIAGNOSIS — I10 ESSENTIAL HYPERTENSION: ICD-10-CM

## 2018-09-26 RX ORDER — FLUOXETINE HYDROCHLORIDE 20 MG/1
CAPSULE ORAL
Qty: 30 CAPSULE | Refills: 1 | Status: SHIPPED | OUTPATIENT
Start: 2018-09-26 | End: 2018-12-19

## 2018-09-26 RX ORDER — LISINOPRIL 10 MG/1
TABLET ORAL
Qty: 30 TABLET | Refills: 1 | Status: SHIPPED | OUTPATIENT
Start: 2018-09-26 | End: 2019-04-29 | Stop reason: SDUPTHER

## 2018-10-17 ENCOUNTER — TELEPHONE (OUTPATIENT)
Dept: INTERNAL MEDICINE | Facility: CLINIC | Age: 58
End: 2018-10-17

## 2018-10-17 NOTE — TELEPHONE ENCOUNTER
----- Message from Annabelle Javier sent at 10/16/2018  3:44 PM EDT -----  PATIENT CALLED WANTING TO KNOW IF SHE WAS UP TO DATE ON HER TETANUS VACCINE.    PATIENT CONTACT: [phone]    THANK YOU.

## 2018-12-19 ENCOUNTER — OFFICE VISIT (OUTPATIENT)
Dept: INTERNAL MEDICINE | Facility: CLINIC | Age: 58
End: 2018-12-19

## 2018-12-19 VITALS
SYSTOLIC BLOOD PRESSURE: 124 MMHG | TEMPERATURE: 97.2 F | WEIGHT: 189 LBS | HEART RATE: 70 BPM | DIASTOLIC BLOOD PRESSURE: 82 MMHG | RESPIRATION RATE: 20 BRPM | BODY MASS INDEX: 31.94 KG/M2

## 2018-12-19 DIAGNOSIS — Z23 NEED FOR IMMUNIZATION AGAINST INFLUENZA: ICD-10-CM

## 2018-12-19 DIAGNOSIS — Z23 NEED FOR HEPATITIS A VACCINATION: ICD-10-CM

## 2018-12-19 DIAGNOSIS — J01.80 OTHER ACUTE SINUSITIS, RECURRENCE NOT SPECIFIED: Primary | ICD-10-CM

## 2018-12-19 DIAGNOSIS — H65.192 OTHER ACUTE NONSUPPURATIVE OTITIS MEDIA OF LEFT EAR, RECURRENCE NOT SPECIFIED: ICD-10-CM

## 2018-12-19 PROCEDURE — 90632 HEPA VACCINE ADULT IM: CPT | Performed by: INTERNAL MEDICINE

## 2018-12-19 PROCEDURE — 99214 OFFICE O/P EST MOD 30 MIN: CPT | Performed by: INTERNAL MEDICINE

## 2018-12-19 PROCEDURE — 90686 IIV4 VACC NO PRSV 0.5 ML IM: CPT | Performed by: INTERNAL MEDICINE

## 2018-12-19 PROCEDURE — 90471 IMMUNIZATION ADMIN: CPT | Performed by: INTERNAL MEDICINE

## 2018-12-19 PROCEDURE — 90472 IMMUNIZATION ADMIN EACH ADD: CPT | Performed by: INTERNAL MEDICINE

## 2018-12-19 RX ORDER — CEFDINIR 300 MG/1
300 CAPSULE ORAL 2 TIMES DAILY
Qty: 20 CAPSULE | Refills: 0 | Status: SHIPPED | OUTPATIENT
Start: 2018-12-19 | End: 2018-12-29

## 2018-12-19 NOTE — PROGRESS NOTES
Subjective       Ksenia Ratliff is a 58 y.o. female.     Chief Complaint   Patient presents with   • Nasal Congestion       History obtained from the patient.      URI    This is a new problem. Episode onset: 1 month ago. The problem has been gradually worsening. There has been no fever. Associated symptoms include congestion, coughing (wet, productive of green sputum), ear pain (left, started last night), rhinorrhea (green) and a sore throat (intermittent). Pertinent negatives include no abdominal pain, chest pain, diarrhea, headaches, joint pain, joint swelling, nausea, neck pain, rash, sinus pain, sneezing, swollen glands, vomiting or wheezing. Treatments tried: Delsym, Claritin D, Mucinex, Emergent C, Cold-eaze, and increased fluids. The treatment provided moderate relief.        The following portions of the patient's history were reviewed and updated as appropriate: allergies, current medications, past family history, past medical history, past social history, past surgical history and problem list.      Review of Systems   Constitutional: Negative for chills, fatigue and fever.   HENT: Positive for congestion, ear pain (left, started last night), postnasal drip, rhinorrhea (green), sinus pressure and sore throat (intermittent). Negative for sinus pain, sneezing and voice change.    Eyes: Positive for discharge (watery). Negative for pain, redness and itching.   Respiratory: Positive for cough (wet, productive of green sputum). Negative for shortness of breath and wheezing.    Cardiovascular: Negative for chest pain.   Gastrointestinal: Negative for abdominal pain, diarrhea, nausea and vomiting.   Musculoskeletal: Negative for arthralgias, joint pain, joint swelling, myalgias, neck pain and neck stiffness.   Skin: Negative for rash.   Neurological: Negative for headaches.   Hematological: Negative for adenopathy.           Objective     Blood pressure 124/82, pulse 70, temperature 97.2 °F (36.2 °C),  temperature source Temporal, resp. rate 20, weight 85.7 kg (189 lb).    Physical Exam   Constitutional:   Obese.   HENT:   Head: Normocephalic and atraumatic.   Right Ear: Tympanic membrane, external ear and ear canal normal.   Left Ear: External ear and ear canal normal. Tympanic membrane is erythematous (and dull).   Mouth/Throat: Oropharynx is clear and moist and mucous membranes are normal. No oral lesions.   Tonsils normal.  No sinus tenderness to palpation.   Eyes: Conjunctivae are normal.   Neck: Normal range of motion. Neck supple.   Cardiovascular: Normal rate and regular rhythm.   No murmur heard.  Pulmonary/Chest: Effort normal and breath sounds normal.   Lymphadenopathy:     She has no cervical adenopathy.   Skin: No rash noted.   Psychiatric: She has a normal mood and affect.   Nursing note and vitals reviewed.        Assessment/Plan   Ksenia was seen today for nasal congestion.    Diagnoses and all orders for this visit:    Other acute sinusitis, recurrence not specified  -     cefdinir (OMNICEF) 300 MG capsule; Take 1 capsule by mouth 2 (Two) Times a Day for 10 days.   Continue the current otc cold medication, and plenty of fluids.    Other acute nonsuppurative otitis media of left ear, recurrence not specified    Need for immunization against influenza  -     Fluarix/Fluzone/Afluria Quad>6 Months    Need for hepatitis A vaccination  -     Hepatitis A Vaccine Adult IM      Return if symptoms worsen or fail to improve.

## 2019-02-27 DIAGNOSIS — J30.2 SEASONAL ALLERGIC RHINITIS: ICD-10-CM

## 2019-02-28 RX ORDER — MONTELUKAST SODIUM 10 MG/1
TABLET ORAL
Qty: 30 TABLET | Refills: 2 | Status: SHIPPED | OUTPATIENT
Start: 2019-02-28 | End: 2019-04-01 | Stop reason: SDUPTHER

## 2019-04-01 RX ORDER — MONTELUKAST SODIUM 10 MG/1
TABLET ORAL
Qty: 30 TABLET | Refills: 9 | Status: SHIPPED | OUTPATIENT
Start: 2019-04-01 | End: 2020-03-03 | Stop reason: SDUPTHER

## 2019-04-29 DIAGNOSIS — I10 ESSENTIAL HYPERTENSION: ICD-10-CM

## 2019-04-30 RX ORDER — LISINOPRIL 10 MG/1
TABLET ORAL
Qty: 60 TABLET | Refills: 3 | Status: SHIPPED | OUTPATIENT
Start: 2019-04-30 | End: 2019-06-28 | Stop reason: SDUPTHER

## 2019-06-02 DIAGNOSIS — K58.9 IRRITABLE BOWEL SYNDROME WITHOUT DIARRHEA: ICD-10-CM

## 2019-06-03 RX ORDER — FLUOXETINE HYDROCHLORIDE 20 MG/1
CAPSULE ORAL
Qty: 90 CAPSULE | Refills: 1 | Status: SHIPPED | OUTPATIENT
Start: 2019-06-03 | End: 2019-12-23

## 2019-06-28 ENCOUNTER — OFFICE VISIT (OUTPATIENT)
Dept: INTERNAL MEDICINE | Facility: CLINIC | Age: 59
End: 2019-06-28

## 2019-06-28 VITALS
BODY MASS INDEX: 30.73 KG/M2 | HEART RATE: 68 BPM | RESPIRATION RATE: 18 BRPM | HEIGHT: 64 IN | SYSTOLIC BLOOD PRESSURE: 150 MMHG | TEMPERATURE: 98.7 F | WEIGHT: 180 LBS | DIASTOLIC BLOOD PRESSURE: 88 MMHG

## 2019-06-28 DIAGNOSIS — M25.552 HIP PAIN, BILATERAL: ICD-10-CM

## 2019-06-28 DIAGNOSIS — K21.9 GASTROESOPHAGEAL REFLUX DISEASE WITHOUT ESOPHAGITIS: ICD-10-CM

## 2019-06-28 DIAGNOSIS — Z23 NEED FOR HEPATITIS A IMMUNIZATION: ICD-10-CM

## 2019-06-28 DIAGNOSIS — Z82.49 FAMILY HISTORY OF CORONARY ARTERY DISEASE IN MOTHER: ICD-10-CM

## 2019-06-28 DIAGNOSIS — C50.912 MALIGNANT NEOPLASM OF LEFT FEMALE BREAST, UNSPECIFIED ESTROGEN RECEPTOR STATUS, UNSPECIFIED SITE OF BREAST (HCC): ICD-10-CM

## 2019-06-28 DIAGNOSIS — B07.8 OTHER VIRAL WARTS: ICD-10-CM

## 2019-06-28 DIAGNOSIS — Z79.899 HIGH RISK MEDICATION USE: ICD-10-CM

## 2019-06-28 DIAGNOSIS — Z82.49 FAMILY HISTORY OF CORONARY ARTERY DISEASE IN FATHER: ICD-10-CM

## 2019-06-28 DIAGNOSIS — G89.29 CHRONIC MIDLINE LOW BACK PAIN WITHOUT SCIATICA: ICD-10-CM

## 2019-06-28 DIAGNOSIS — M54.50 CHRONIC MIDLINE LOW BACK PAIN WITHOUT SCIATICA: ICD-10-CM

## 2019-06-28 DIAGNOSIS — Z00.00 ENCOUNTER FOR HEALTH MAINTENANCE EXAMINATION IN ADULT: Primary | ICD-10-CM

## 2019-06-28 DIAGNOSIS — I10 UNCONTROLLED HYPERTENSION: ICD-10-CM

## 2019-06-28 DIAGNOSIS — Z13.6 SCREENING FOR CARDIOVASCULAR CONDITION: ICD-10-CM

## 2019-06-28 DIAGNOSIS — M25.551 HIP PAIN, BILATERAL: ICD-10-CM

## 2019-06-28 DIAGNOSIS — R91.1 LUNG NODULE, SOLITARY: ICD-10-CM

## 2019-06-28 DIAGNOSIS — K63.5 BENIGN COLONIC POLYP: ICD-10-CM

## 2019-06-28 PROBLEM — K57.30 SIGMOID DIVERTICULOSIS: Status: ACTIVE | Noted: 2019-06-28

## 2019-06-28 PROBLEM — K64.8 INTERNAL HEMORRHOIDS: Status: ACTIVE | Noted: 2019-06-28

## 2019-06-28 LAB
25(OH)D3 SERPL-MCNC: 30.5 NG/ML (ref 30–100)
ALBUMIN SERPL-MCNC: 4.4 G/DL (ref 3.5–5.2)
ALBUMIN/GLOB SERPL: 1.8 G/DL
ALP SERPL-CCNC: 54 U/L (ref 39–117)
ALT SERPL W P-5'-P-CCNC: 19 U/L (ref 1–33)
ANION GAP SERPL CALCULATED.3IONS-SCNC: 13 MMOL/L (ref 5–15)
AST SERPL-CCNC: 17 U/L (ref 1–32)
BASOPHILS # BLD AUTO: 0.06 10*3/MM3 (ref 0–0.2)
BASOPHILS NFR BLD AUTO: 1.2 % (ref 0–1.5)
BILIRUB SERPL-MCNC: 0.3 MG/DL (ref 0.2–1.2)
BUN BLD-MCNC: 12 MG/DL (ref 6–20)
BUN/CREAT SERPL: 22.2 (ref 7–25)
CALCIUM SPEC-SCNC: 9.3 MG/DL (ref 8.6–10.5)
CHLORIDE SERPL-SCNC: 106 MMOL/L (ref 98–107)
CHOLEST SERPL-MCNC: 234 MG/DL (ref 0–200)
CHROMATIN AB SERPL-ACNC: <10 IU/ML (ref 0–14)
CLARITY, POC: CLEAR
CO2 SERPL-SCNC: 23 MMOL/L (ref 22–29)
COLOR UR: YELLOW
CREAT BLD-MCNC: 0.54 MG/DL (ref 0.57–1)
CREAT UR-MCNC: 100 MG/DL
CRP SERPL-MCNC: 0.46 MG/DL (ref 0–0.5)
DEPRECATED RDW RBC AUTO: 44.2 FL (ref 37–54)
EOSINOPHIL # BLD AUTO: 0.18 10*3/MM3 (ref 0–0.4)
EOSINOPHIL NFR BLD AUTO: 3.7 % (ref 0.3–6.2)
ERYTHROCYTE [DISTWIDTH] IN BLOOD BY AUTOMATED COUNT: 12.3 % (ref 12.3–15.4)
EXPIRATION DATE: NORMAL
GFR SERPL CREATININE-BSD FRML MDRD: 116 ML/MIN/1.73
GLOBULIN UR ELPH-MCNC: 2.5 GM/DL
GLUCOSE BLD-MCNC: 101 MG/DL (ref 65–99)
GLUCOSE UR STRIP-MCNC: NEGATIVE MG/DL
HCT VFR BLD AUTO: 39.7 % (ref 34–46.6)
HDLC SERPL-MCNC: 63 MG/DL (ref 40–60)
HGB BLD-MCNC: 12.5 G/DL (ref 12–15.9)
IMM GRANULOCYTES # BLD AUTO: 0.01 10*3/MM3 (ref 0–0.05)
IMM GRANULOCYTES NFR BLD AUTO: 0.2 % (ref 0–0.5)
KETONES UR QL: NEGATIVE
LDLC SERPL CALC-MCNC: 132 MG/DL (ref 0–100)
LDLC/HDLC SERPL: 2.1 {RATIO}
LEUKOCYTE EST, POC: NEGATIVE
LYMPHOCYTES # BLD AUTO: 1.33 10*3/MM3 (ref 0.7–3.1)
LYMPHOCYTES NFR BLD AUTO: 27.4 % (ref 19.6–45.3)
Lab: NORMAL
MAGNESIUM SERPL-MCNC: 2.3 MG/DL (ref 1.6–2.6)
MCH RBC QN AUTO: 31.1 PG (ref 26.6–33)
MCHC RBC AUTO-ENTMCNC: 31.5 G/DL (ref 31.5–35.7)
MCV RBC AUTO: 98.8 FL (ref 79–97)
MONOCYTES # BLD AUTO: 0.36 10*3/MM3 (ref 0.1–0.9)
MONOCYTES NFR BLD AUTO: 7.4 % (ref 5–12)
NEUTROPHILS # BLD AUTO: 2.92 10*3/MM3 (ref 1.7–7)
NEUTROPHILS NFR BLD AUTO: 60.1 % (ref 42.7–76)
NITRITE UR-MCNC: NEGATIVE MG/ML
NRBC BLD AUTO-RTO: 0 /100 WBC (ref 0–0.2)
PH UR: 7.5 [PH] (ref 5–8)
PLATELET # BLD AUTO: 297 10*3/MM3 (ref 140–450)
PMV BLD AUTO: 11.5 FL (ref 6–12)
POTASSIUM BLD-SCNC: 4.3 MMOL/L (ref 3.5–5.2)
PROT SERPL-MCNC: 6.9 G/DL (ref 6–8.5)
PROT UR STRIP-MCNC: NEGATIVE MG/DL
RBC # BLD AUTO: 4.02 10*6/MM3 (ref 3.77–5.28)
RBC # UR STRIP: NEGATIVE /UL
SODIUM BLD-SCNC: 142 MMOL/L (ref 136–145)
SP GR UR: 1.01 (ref 1–1.03)
TRIGL SERPL-MCNC: 195 MG/DL (ref 0–150)
TSH SERPL DL<=0.05 MIU/L-ACNC: 1.71 MIU/ML (ref 0.27–4.2)
VLDLC SERPL-MCNC: 39 MG/DL (ref 5–40)
WBC NRBC COR # BLD: 4.86 10*3/MM3 (ref 3.4–10.8)

## 2019-06-28 PROCEDURE — 90471 IMMUNIZATION ADMIN: CPT | Performed by: INTERNAL MEDICINE

## 2019-06-28 PROCEDURE — 36415 COLL VENOUS BLD VENIPUNCTURE: CPT | Performed by: INTERNAL MEDICINE

## 2019-06-28 PROCEDURE — 90632 HEPA VACCINE ADULT IM: CPT | Performed by: INTERNAL MEDICINE

## 2019-06-28 PROCEDURE — 84443 ASSAY THYROID STIM HORMONE: CPT | Performed by: INTERNAL MEDICINE

## 2019-06-28 PROCEDURE — 86140 C-REACTIVE PROTEIN: CPT | Performed by: INTERNAL MEDICINE

## 2019-06-28 PROCEDURE — 85025 COMPLETE CBC W/AUTO DIFF WBC: CPT | Performed by: INTERNAL MEDICINE

## 2019-06-28 PROCEDURE — 81003 URINALYSIS AUTO W/O SCOPE: CPT | Performed by: INTERNAL MEDICINE

## 2019-06-28 PROCEDURE — 86431 RHEUMATOID FACTOR QUANT: CPT | Performed by: INTERNAL MEDICINE

## 2019-06-28 PROCEDURE — 80061 LIPID PANEL: CPT | Performed by: INTERNAL MEDICINE

## 2019-06-28 PROCEDURE — 83735 ASSAY OF MAGNESIUM: CPT | Performed by: INTERNAL MEDICINE

## 2019-06-28 PROCEDURE — 99212 OFFICE O/P EST SF 10 MIN: CPT | Performed by: INTERNAL MEDICINE

## 2019-06-28 PROCEDURE — 80053 COMPREHEN METABOLIC PANEL: CPT | Performed by: INTERNAL MEDICINE

## 2019-06-28 PROCEDURE — 99396 PREV VISIT EST AGE 40-64: CPT | Performed by: INTERNAL MEDICINE

## 2019-06-28 PROCEDURE — 82306 VITAMIN D 25 HYDROXY: CPT | Performed by: INTERNAL MEDICINE

## 2019-06-28 RX ORDER — LISINOPRIL 20 MG/1
20 TABLET ORAL DAILY
Qty: 90 TABLET | Refills: 3 | Status: SHIPPED | OUTPATIENT
Start: 2019-06-28 | End: 2019-07-22 | Stop reason: SDUPTHER

## 2019-06-28 NOTE — PATIENT INSTRUCTIONS
I recommend Shingrix (new Shingles vaccine) at the pharmacy.      Health Maintenance for Postmenopausal Women  Menopause is a normal process in which your reproductive ability comes to an end. This process happens gradually over a span of months to years, usually between the ages of 48 and 55. Menopause is complete when you have missed 12 consecutive menstrual periods.  It is important to talk with your health care provider about some of the most common conditions that affect postmenopausal women, such as heart disease, cancer, and bone loss (osteoporosis). Adopting a healthy lifestyle and getting preventive care can help to promote your health and wellness. Those actions can also lower your chances of developing some of these common conditions.  What should I know about menopause?  During menopause, you may experience a number of symptoms, such as:  · Moderate-to-severe hot flashes.  · Night sweats.  · Decrease in sex drive.  · Mood swings.  · Headaches.  · Tiredness.  · Irritability.  · Memory problems.  · Insomnia.    Choosing to treat or not to treat menopausal changes is an individual decision that you make with your health care provider.  What should I know about hormone replacement therapy and supplements?  Hormone therapy products are effective for treating symptoms that are associated with menopause, such as hot flashes and night sweats. Hormone replacement carries certain risks, especially as you become older. If you are thinking about using estrogen or estrogen with progestin treatments, discuss the benefits and risks with your health care provider.  What should I know about heart disease and stroke?  Heart disease, heart attack, and stroke become more likely as you age. This may be due, in part, to the hormonal changes that your body experiences during menopause. These can affect how your body processes dietary fats, triglycerides, and cholesterol. Heart attack and stroke are both medical  emergencies.  There are many things that you can do to help prevent heart disease and stroke:  · Have your blood pressure checked at least every 1-2 years. High blood pressure causes heart disease and increases the risk of stroke.  · If you are 55-79 years old, ask your health care provider if you should take aspirin to prevent a heart attack or a stroke.  · Do not use any tobacco products, including cigarettes, chewing tobacco, or electronic cigarettes. If you need help quitting, ask your health care provider.  · It is important to eat a healthy diet and maintain a healthy weight.  ? Be sure to include plenty of vegetables, fruits, low-fat dairy products, and lean protein.  ? Avoid eating foods that are high in solid fats, added sugars, or salt (sodium).  · Get regular exercise. This is one of the most important things that you can do for your health.  ? Try to exercise for at least 150 minutes each week. The type of exercise that you do should increase your heart rate and make you sweat. This is known as moderate-intensity exercise.  ? Try to do strengthening exercises at least twice each week. Do these in addition to the moderate-intensity exercise.  · Know your numbers. Ask your health care provider to check your cholesterol and your blood glucose. Continue to have your blood tested as directed by your health care provider.    What should I know about cancer screening?  There are several types of cancer. Take the following steps to reduce your risk and to catch any cancer development as early as possible.  Breast Cancer  · Practice breast self-awareness.  ? This means understanding how your breasts normally appear and feel.  ? It also means doing regular breast self-exams. Let your health care provider know about any changes, no matter how small.  · If you are 40 or older, have a clinician do a breast exam (clinical breast exam or CBE) every year. Depending on your age, family history, and medical history, it  may be recommended that you also have a yearly breast X-ray (mammogram).  · If you have a family history of breast cancer, talk with your health care provider about genetic screening.  · If you are at high risk for breast cancer, talk with your health care provider about having an MRI and a mammogram every year.  · Breast cancer (BRCA) gene test is recommended for women who have family members with BRCA-related cancers. Results of the assessment will determine the need for genetic counseling and BRCA1 and for BRCA2 testing. BRCA-related cancers include these types:  ? Breast. This occurs in males or females.  ? Ovarian.  ? Tubal. This may also be called fallopian tube cancer.  ? Cancer of the abdominal or pelvic lining (peritoneal cancer).  ? Prostate.  ? Pancreatic.    Cervical, Uterine, and Ovarian Cancer  Your health care provider may recommend that you be screened regularly for cancer of the pelvic organs. These include your ovaries, uterus, and vagina. This screening involves a pelvic exam, which includes checking for microscopic changes to the surface of your cervix (Pap test).  · For women ages 21-65, health care providers may recommend a pelvic exam and a Pap test every three years. For women ages 30-65, they may recommend the Pap test and pelvic exam, combined with testing for human papilloma virus (HPV), every five years. Some types of HPV increase your risk of cervical cancer. Testing for HPV may also be done on women of any age who have unclear Pap test results.  · Other health care providers may not recommend any screening for nonpregnant women who are considered low risk for pelvic cancer and have no symptoms. Ask your health care provider if a screening pelvic exam is right for you.  · If you have had past treatment for cervical cancer or a condition that could lead to cancer, you need Pap tests and screening for cancer for at least 20 years after your treatment. If Pap tests have been discontinued  for you, your risk factors (such as having a new sexual partner) need to be reassessed to determine if you should start having screenings again. Some women have medical problems that increase the chance of getting cervical cancer. In these cases, your health care provider may recommend that you have screening and Pap tests more often.  · If you have a family history of uterine cancer or ovarian cancer, talk with your health care provider about genetic screening.  · If you have vaginal bleeding after reaching menopause, tell your health care provider.  · There are currently no reliable tests available to screen for ovarian cancer.    Lung Cancer  Lung cancer screening is recommended for adults 55-80 years old who are at high risk for lung cancer because of a history of smoking. A yearly low-dose CT scan of the lungs is recommended if you:  · Currently smoke.  · Have a history of at least 30 pack-years of smoking and you currently smoke or have quit within the past 15 years. A pack-year is smoking an average of one pack of cigarettes per day for one year.    Yearly screening should:  · Continue until it has been 15 years since you quit.  · Stop if you develop a health problem that would prevent you from having lung cancer treatment.    Colorectal Cancer  · This type of cancer can be detected and can often be prevented.  · Routine colorectal cancer screening usually begins at age 50 and continues through age 75.  · If you have risk factors for colon cancer, your health care provider may recommend that you be screened at an earlier age.  · If you have a family history of colorectal cancer, talk with your health care provider about genetic screening.  · Your health care provider may also recommend using home test kits to check for hidden blood in your stool.  · A small camera at the end of a tube can be used to examine your colon directly (sigmoidoscopy or colonoscopy). This is done to check for the earliest forms of  colorectal cancer.  · Direct examination of the colon should be repeated every 5-10 years until age 75. However, if early forms of precancerous polyps or small growths are found or if you have a family history or genetic risk for colorectal cancer, you may need to be screened more often.    Skin Cancer  · Check your skin from head to toe regularly.  · Monitor any moles. Be sure to tell your health care provider:  ? About any new moles or changes in moles, especially if there is a change in a mole's shape or color.  ? If you have a mole that is larger than the size of a pencil eraser.  · If any of your family members has a history of skin cancer, especially at a young age, talk with your health care provider about genetic screening.  · Always use sunscreen. Apply sunscreen liberally and repeatedly throughout the day.  · Whenever you are outside, protect yourself by wearing long sleeves, pants, a wide-brimmed hat, and sunglasses.    What should I know about osteoporosis?  Osteoporosis is a condition in which bone destruction happens more quickly than new bone creation. After menopause, you may be at an increased risk for osteoporosis. To help prevent osteoporosis or the bone fractures that can happen because of osteoporosis, the following is recommended:  · If you are 19-50 years old, get at least 1,000 mg of calcium and at least 600 mg of vitamin D per day.  · If you are older than age 50 but younger than age 70, get at least 1,200 mg of calcium and at least 600 mg of vitamin D per day.  · If you are older than age 70, get at least 1,200 mg of calcium and at least 800 mg of vitamin D per day.    Smoking and excessive alcohol intake increase the risk of osteoporosis. Eat foods that are rich in calcium and vitamin D, and do weight-bearing exercises several times each week as directed by your health care provider.  What should I know about how menopause affects my mental health?  Depression may occur at any age, but it  is more common as you become older. Common symptoms of depression include:  · Low or sad mood.  · Changes in sleep patterns.  · Changes in appetite or eating patterns.  · Feeling an overall lack of motivation or enjoyment of activities that you previously enjoyed.  · Frequent crying spells.    Talk with your health care provider if you think that you are experiencing depression.  What should I know about immunizations?  It is important that you get and maintain your immunizations. These include:  · Tetanus, diphtheria, and pertussis (Tdap) booster vaccine.  · Influenza every year before the flu season begins.  · Pneumonia vaccine.  · Shingles vaccine.    Your health care provider may also recommend other immunizations.  This information is not intended to replace advice given to you by your health care provider. Make sure you discuss any questions you have with your health care provider.  Document Released: 02/09/2007 Document Revised: 07/07/2017 Document Reviewed: 09/20/2016  [employer] Patient Education © 2019 [employer]

## 2019-06-28 NOTE — PROGRESS NOTES
Subjective     Chief Complaint:  Physical Exam.    History of Present Illness    History obtained from the patient.    The patient sees her Oncologist, Dr. Ortiz, once per year, last in April 2019, for her left Breast Cancer.  She states she had a breast exam done at that visit.  She is not currently on medication. Last Mammogram was 5/6/19, cat 2.    The patient has a history of a Right Upper Lung Nodule on on a chest CT on 4/10/2014.  She has had follow-up CT scans at [facility], ordered by Dr. Ortiz.  She states the nodule has been stable.    The patient is having lower back pain and bilateral hip pain for several months.  She is requesting labs to evaluate for Arthritis.    The patient's blood sugar on 6/14/17 was 114 fasting.  On 6/27/2018, Hemoglobin A1c was 5.4.        Primary Care Cardiac Diagnostic Constellation: The patient is here today for a follow-up visit.      Her Hypertension is unstable.   Medication(s): Lisinopril and Aspirin.   The patient is adherent with her medication regimen. She denies medication side effects.      Interval Events: The patient does not check her blood pressure at home.  However, her blood pressure at the dentist yesterday was 173/95.  On 6/27/2018, LDL Cholesterol was 145 and Triglycerides 169.     Symptoms: Denies chest pain, dyspnea, MELISSA, orthopnea, PND, palpitations, syncope, lower extremity edema, intermittent leg claudication, lightheadedness, and dizziness.   Associated symptoms:  7 pound intentional weight loss in the past 1 year.  No fatigue, headaches, polyuria, polydipsia, arthralgias, myalgias, visual impairment, memory loss, concentration issues, or focal neurological deficit.      Lifestyle and Disease Management: Diet: She consumes a diverse and healthy diet. Weight Issues: She has weight concerns. Exercise: She exercises regularly. She exercises 7 times per week, except the last 3 weeks. Exercise includes walking and yard work.   Smoking: She  does not use tobacco.      Gastroesophageal Reflux Disease Follow-up: The patient is being seen for a routine clinic follow-up of Gastroesophageal Reflux Disease, which is stable.   Symptoms: No abdominal pain, heartburn, acid regurgitation, nausea, vomiting, hematemesis, dysphagia, odynophagia, hematochezia, melena, early satiety, belching, or bloating.   Associated symptoms: No chronic sore throat, cough, hoarseness, or wheezing.   Medications:   Omeprazole every other day and Prozac for IBS.        Colonic Polyp Follow-up: The patient is being seen for a routine clinic follow-up of Colon Polyp(s), which is stable.   Pertinent Medical History: anal fissure.  Interval Events:  Current diagnosis was determined by Colonoscopy and last 12/13/17, no polyps.   Symptoms:   No abdominal pain, diarrhea, constipation, hematochezia, melena, decreased stool caliber, or change in bowel habits.    Associated symptoms: no rectal prolapse.   Medication:    Align and 2 stool softeners.       Ksenia Ratliff is a 58 y.o. female who presents for an Annual Physical.      PMH, PSH, SocHx, FamHx, Allergies, and Medications: Reviewed and updated.    Outpatient Medications Prior to Visit   Medication Sig Dispense Refill   • aspirin 81 MG tablet Take 3 tablets by mouth daily.     • calcium citrate-vitamin d (CALCITRATE) 315-250 MG-UNIT tablet tablet Take  by mouth Daily.     • Docusate Calcium (STOOL SOFTENER PO) 2 capsules Daily.     • FLUoxetine (PROzac) 20 MG capsule TAKE ONE CAPSULE BY MOUTH EVERY DAY 90 capsule 1   • fluticasone (FLONASE) 50 MCG/ACT nasal spray into each nostril daily.     • Ibuprofen-diphenhydrAMINE Cit (EQL IBUPROFEN PM PO) Take  by mouth.     • lisinopril (PRINIVIL,ZESTRIL) 10 MG tablet TAKE 1 TABLET BY MOUTH EVERY DAY 60 tablet 3   • Loratadine-Pseudoephedrine (CLARITIN-D 12 HOUR PO) Daily.     • montelukast (SINGULAIR) 10 MG tablet TAKE 1 TABLET BY MOUTH EVERY DAY 30 tablet 9   • Multiple Vitamins-Minerals  (MULTI VITAMIN/MINERALS PO) Take  by mouth daily.     • omeprazole (priLOSEC) 20 MG capsule 1 capsule Daily.     • Probiotic Product (ALIGN) 4 MG capsule 1 capsule Daily.     • loratadine-pseudoephedrine (CLARITIN-D 24 HOUR)  MG per 24 hr tablet Take 1 tablet by mouth Daily. 90 tablet 1     No facility-administered medications prior to visit.        Immunization History   Administered Date(s) Administered   • FLUARIX/FLUZONE/AFLURIA/FLULAVAL QUAD 12/19/2018   • Flu Mist 01/10/2016, 10/07/2017   • Hepatitis A 12/19/2018   • Tdap 03/21/2011         Patient Active Problem List   Diagnosis   • Malignant neoplasm of breast (CMS/HCC)   • Lung nodule, solitary   • Allergic rhinitis   • Anemia   • Benign colonic polyp   • Gastroesophageal reflux disease   • Hypertension   • Irritable bowel syndrome   • Menopausal state   • Sigmoid diverticulosis   • Internal hemorrhoids       Health Habits:  Dental Exam. up to date  Eye Exam. up to date  Hearing Loss:  No  Exercise: 7 times/week.  Current exercise activities include: walking  Diet: Healthy  Multivitamin: Yes    Safe Driving:  Yes  Seat Belt:  Yes  Bike Helmet:  N/A  Skin Screening:  Yes  Sunscreen: Yes  SBE / COLE: Yes  Sexual Activity:  Yes  Birth Control:  Menopause  STD Prevention:  N/A    Last Pap: June 2018, normal per patient report.  She also reports she had a normal pelvic exam per GYN in June 2019.  Last Mammogram: 5/6/2019, category 2.  Last DEXA Scan: 7/21/2017, normal.  Last Colonoscopy: 12/13/2017, sigmoid diverticulosis and internal hemorrhoids.  Last PSA: N/A    Social:    Social History     Socioeconomic History   • Marital status:      Spouse name: Not on file   • Number of children: 2   • Years of education: Not on file   • Highest education level: Not on file   Occupational History   • Occupation: Retired   Tobacco Use   • Smoking status: Former Smoker   • Smokeless tobacco: Never Used   • Tobacco comment: 1/2 ppd x 4-6 years in high school    Substance and Sexual Activity   • Alcohol use: Yes     Comment: 1-3 glasses of wine daily   • Drug use: No   • Sexual activity: Yes     Partners: Male     Birth control/protection: Post-menopausal         Current Medical Providers:    Eun Mcmanus MD (Internal Medicine / Pediatrics)    The [facility] providers who are involved in the care of this patient are listed above.         Review of Systems   Constitutional: Negative for chills, fatigue, fever and unexpected weight change.        Has night sweats.    HENT: Negative for congestion, ear pain, hearing loss, nosebleeds, postnasal drip, rhinorrhea, sinus pressure, sinus pain, sneezing, sore throat, tinnitus and voice change.         Reports snoring.   Eyes: Positive for discharge (watery with allergies). Negative for photophobia, pain, redness, itching and visual disturbance.   Respiratory: Negative for apnea, cough, chest tightness, shortness of breath, wheezing and stridor.         No chest congestion.  No hemoptysis.   Cardiovascular: Negative for chest pain, palpitations and leg swelling.        No orthopnea, MELISSA, or PND.  No claudication or syncope.   Gastrointestinal: Negative for abdominal pain, blood in stool, constipation, diarrhea, nausea, rectal pain and vomiting.        No melena.  No hematemesis.  No heartburn, dysphagia or odynophagia.  No early satiety, belching, or bloating.    Endocrine: Negative for cold intolerance, heat intolerance, polydipsia, polyphagia and polyuria.        No hair loss or dry skin.  Has hot flashes.     Genitourinary: Positive for dyspareunia. Negative for difficulty urinating, dysuria, flank pain, frequency, hematuria, pelvic pain, urgency, vaginal bleeding and vaginal discharge.        No nocturia, incomplete emptying, or incontinence.   Musculoskeletal: Positive for back pain. Negative for arthralgias, gait problem, joint swelling, myalgias, neck pain and neck stiffness.        Has joint stiffness in back and  "hips.   Skin: Negative for rash.        The patient noticed a new lesion on her left ear pinna a few weeks ago.  No other new skin lesions or changes in skin lesions. No breast pain or masses.  No nipple discharge or nipple inversion.   Neurological: Negative for dizziness, tremors, syncope, speech difficulty, weakness, light-headedness, numbness and headaches.        No tingling.  No memory loss.  No decreased concentration.   Hematological: Negative for adenopathy. Does not bruise/bleed easily.   Psychiatric/Behavioral: Negative for confusion, sleep disturbance and suicidal ideas. The patient is not nervous/anxious.         No depression.           Objective     Vitals:    06/28/19 0933   BP: 150/88   BP Location: Right arm   Pulse: 68   Resp: 18   Temp: 98.7 °F (37.1 °C)   TempSrc: Temporal   Weight: 81.6 kg (180 lb)   Height: 163.2 cm (64.25\")       Body mass index is 30.66 kg/m².    Physical Exam   Constitutional:   Overweight, borderline obese.   HENT:   Head: Normocephalic and atraumatic.   Right Ear: Tympanic membrane, external ear and ear canal normal.   Left Ear: Tympanic membrane, external ear and ear canal normal.   Mouth/Throat: Oropharynx is clear and moist. No oral lesions.   Tonsils normal.   Eyes: Conjunctivae and EOM are normal. Pupils are equal, round, and reactive to light.   Neck: Normal range of motion. Neck supple. Carotid bruit is not present. No thyroid mass and no thyromegaly present.   Cardiovascular: Normal rate, regular rhythm, normal heart sounds and intact distal pulses. Exam reveals no gallop and no friction rub.   No murmur heard.  No peripheral edema.   Pulmonary/Chest: Effort normal and breath sounds normal.   The patient declined a chest breast exam today, due to recent exam by GYN, exam by Dr. Ortiz and April, and recent normal mammogram.   Abdominal: Soft. Bowel sounds are normal. She exhibits no distension, no abdominal bruit and no mass. There is no hepatosplenomegaly. There " is no tenderness.   Genitourinary:   Genitourinary Comments:  and rectal exam deferred.   Musculoskeletal: Normal range of motion.   Lymphadenopathy:     She has no cervical adenopathy. No inguinal adenopathy noted on the right or left side.        Right: No inguinal and no supraclavicular adenopathy present.        Left: No inguinal and no supraclavicular adenopathy present.   Neurological: She is alert. She has normal strength and normal reflexes. No cranial nerve deficit. Coordination and gait normal.   Skin: No lesion and no rash noted.   There is a fleshy, hard lesion on the left ear pinna.  No other atypical skin lesions.   Psychiatric: She has a normal mood and affect.   Nursing note and vitals reviewed.      PHQ-2 Depression Screening  Little interest or pleasure in doing things? 0   Feeling down, depressed, or hopeless? 0   PHQ-2 Total Score 0         Counseling was given to patient for the following topics:  appropriate exercise, healthy eating habits, disease prevention, risk factors for cancer, importance of self breast exam and breast health, importance of immunizations, including risks and benefits, bone health, sun safety, seatbelt use and safe driving. Also discussed the importance of regular dental and vision care, as well recommendation for a yearly screening skin exam after age 40.  Written information provided to patient on these topics and other health maintenance issues.    Results for orders placed or performed in visit on 06/28/19   POC Urinalysis Dipstick, Multipro   Result Value Ref Range    Color Yellow Yellow, Straw, Dark Yellow, Dede    Clarity, UA Clear Clear    Glucose, UA Negative Negative, 1000 mg/dL (3+) mg/dL    Ketones, UA Negative Negative    Specific Gravity  1.015 1.005 - 1.030    Blood, UA Negative Negative    pH, Urine 7.5 5.0 - 8.0    Protein, POC Negative Negative mg/dL    Nitrite, UA Negative Negative    Leukocytes Negative Negative    Creatinine 100 mg/dl    Lot Number  [id number]     Expiration Date 3-31-20        Assessment/Plan       Ksenia was seen today for annual exam.    Diagnoses and all orders for this visit:    Encounter for health maintenance examination in adult  -     Lipid Panel  -     Comprehensive Metabolic Panel  -     TSH  -     Vitamin D 25 Hydroxy  -     CBC & Differential  -     Magnesium  -     POC Urinalysis Dipstick, Multipro  -     CBC Auto Differential    Uncontrolled hypertension  -     lisinopril (PRINIVIL,ZESTRIL) 20 MG tablet; Take 1 tablet by mouth Daily (increased dose).  -     Lipid Panel  -     Comprehensive Metabolic Panel  -     TSH  -     CBC & Differential  -     Magnesium  -     POC Urinalysis Dipstick, Multipro  -     CBC Auto Differential    Malignant neoplasm of left female breast, unspecified estrogen receptor status, unspecified site of breast (CMS/HCC)   Follow-up with Dr. Ortiz.    Gastroesophageal reflux disease without esophagitis   Continue current medication(s) as noted in the history of present illness.    Benign colonic polyp   Colonoscopy up-to-date.    Hip pain, bilateral  -     C-reactive Protein  -     Rheumatoid Factor    Chronic midline low back pain without sciatica  -     C-reactive Protein  -     Rheumatoid Factor    Lung nodule, solitary   EVERARDO sent for the patient's chest CT reports from [facility].    Other viral warts   The patient has an upcoming Dermatology appointment.    High risk medication use  -     Magnesium    Need for hepatitis A immunization  -     Hepatitis A Vaccine Adult IM    Screening for cardiovascular condition  -     CT Cardiac Calcium Score Without Dye; Future    Family history of coronary artery disease in father  -     CT Cardiac Calcium Score Without Dye; Future    Family history of coronary artery disease in mother  -     CT Cardiac Calcium Score Without Dye; Future        I recommend Shingrix (new Shingles vaccine) at the pharmacy.      Return in about 3 weeks (around 7/19/2019) for  Recheck, HTN non-fasting.

## 2019-07-16 ENCOUNTER — HOSPITAL ENCOUNTER (OUTPATIENT)
Dept: CT IMAGING | Facility: HOSPITAL | Age: 59
Discharge: HOME | End: 2019-07-16
Admitting: INTERNAL MEDICINE

## 2019-07-16 DIAGNOSIS — Z13.6 SCREENING FOR CARDIOVASCULAR CONDITION: ICD-10-CM

## 2019-07-16 DIAGNOSIS — Z82.49 FAMILY HISTORY OF CORONARY ARTERY DISEASE IN FATHER: ICD-10-CM

## 2019-07-16 DIAGNOSIS — Z82.49 FAMILY HISTORY OF CORONARY ARTERY DISEASE IN MOTHER: ICD-10-CM

## 2019-07-16 PROCEDURE — 75571 CT HRT W/O DYE W/CA TEST: CPT

## 2019-07-21 ENCOUNTER — TELEPHONE (OUTPATIENT)
Dept: INTERNAL MEDICINE | Facility: CLINIC | Age: 59
End: 2019-07-21

## 2019-07-21 ENCOUNTER — HOSPITAL ENCOUNTER (EMERGENCY)
Facility: HOSPITAL | Age: 59
Discharge: HOME | End: 2019-07-21
Attending: EMERGENCY MEDICINE | Admitting: EMERGENCY MEDICINE

## 2019-07-21 VITALS
HEART RATE: 71 BPM | TEMPERATURE: 98.6 F | WEIGHT: 179 LBS | BODY MASS INDEX: 29.82 KG/M2 | DIASTOLIC BLOOD PRESSURE: 96 MMHG | SYSTOLIC BLOOD PRESSURE: 194 MMHG | HEIGHT: 65 IN | RESPIRATION RATE: 16 BRPM | OXYGEN SATURATION: 99 %

## 2019-07-21 DIAGNOSIS — I10 UNCONTROLLED HYPERTENSION: Primary | ICD-10-CM

## 2019-07-21 PROCEDURE — 99284 EMERGENCY DEPT VISIT MOD MDM: CPT

## 2019-07-21 RX ORDER — AMLODIPINE BESYLATE 5 MG/1
5 TABLET ORAL ONCE
Status: COMPLETED | OUTPATIENT
Start: 2019-07-21 | End: 2019-07-21

## 2019-07-21 RX ORDER — HYDROCHLOROTHIAZIDE 25 MG/1
25 TABLET ORAL DAILY
COMMUNITY
End: 2019-08-01 | Stop reason: SDUPTHER

## 2019-07-21 RX ADMIN — AMLODIPINE BESYLATE 5 MG: 5 TABLET ORAL at 21:27

## 2019-07-21 NOTE — TELEPHONE ENCOUNTER
Please obtain all CT chest reports done at  from 2014 to present, prior to the patient's appointment on 7/22/19.

## 2019-07-21 NOTE — TELEPHONE ENCOUNTER
On call, pt calls at 5p for uncontrolled HTN.    States she spoke with PCP Dr. Mcmanus yesterday due to HTN w/ /100's. As pt was reportedly asymptomatic, she was Rx'd HCTZ 25mg daily in addition to c/w her lisinopril 20mg daily. Has had 2 doses of HCTZ. BP initial 148/96 this AM but just before pt called, checked BP and was 210/110's. She denies HA, blurred vision, dizziness, CP, SOB, palps, PND etc.    Endorses +increased sodium/caffeine intake today.    Discussed as pt is asymptomatic, she may take a second dose of lisinopril 20mg x 1 (total dose 40mg) and repeat her BP over the next 30 min-1 hour. If BP not trending down to min SBP<190-200 and DBP<100, recommend she get eval at [facility] or ER rather than wait for her appt with PCP tomorrow afternoon as she would be at risk for MI, CVA etc with that level of HTN.     Needs immediate eval if she experiences any sx such as HA, CP etc.    Also limit sodium/caffeine intake.    Call with any other questions/concerns.    Michaela Weiss MD  7/21/2019

## 2019-07-22 ENCOUNTER — OFFICE VISIT (OUTPATIENT)
Dept: INTERNAL MEDICINE | Facility: CLINIC | Age: 59
End: 2019-07-22

## 2019-07-22 VITALS
DIASTOLIC BLOOD PRESSURE: 92 MMHG | TEMPERATURE: 97.9 F | SYSTOLIC BLOOD PRESSURE: 160 MMHG | HEART RATE: 74 BPM | RESPIRATION RATE: 20 BRPM | BODY MASS INDEX: 29.57 KG/M2 | WEIGHT: 175 LBS

## 2019-07-22 DIAGNOSIS — R91.8 MULTIPLE LUNG NODULES ON CT: ICD-10-CM

## 2019-07-22 DIAGNOSIS — I77.810 ASCENDING AORTA DILATATION (HCC): ICD-10-CM

## 2019-07-22 DIAGNOSIS — I10 HYPERTENSION, UNCONTROLLED: Primary | ICD-10-CM

## 2019-07-22 PROCEDURE — 93000 ELECTROCARDIOGRAM COMPLETE: CPT | Performed by: INTERNAL MEDICINE

## 2019-07-22 PROCEDURE — 99214 OFFICE O/P EST MOD 30 MIN: CPT | Performed by: INTERNAL MEDICINE

## 2019-07-22 RX ORDER — LISINOPRIL 40 MG/1
40 TABLET ORAL DAILY
Qty: 30 TABLET | Refills: 5 | Status: SHIPPED | OUTPATIENT
Start: 2019-07-22 | End: 2019-10-17 | Stop reason: SDUPTHER

## 2019-07-22 NOTE — TELEPHONE ENCOUNTER
Records from [facility] requested this morning.    Faxed to [phone]  Spoke with Jacques.

## 2019-07-22 NOTE — PROGRESS NOTES
Subjective       Ksenia Ratliff is a 58 y.o. female.     Chief Complaint   Patient presents with   • Hypertension     ER follow up          History obtained from the patient and chart review.      History of Present Illness     The patient had a Cardiac CT Scan on 7/16/2019.  Calcium score was 0, but there was an irregular 1.6 cm nodular opacity in the right middle lobe.  There was also a groundglass 6 mm nodule in the right upper lobe.  Ascending aorta maximum diameter was 3.5 cm.  There was also sequelae of prior healed granulomatous disease.  The patient had a right middle lobe nodule first noted on an outside CT scan on 3/26/2014 and subsequently on a [facility] CT scan on 4/14/2014.  The patient had stated Dr. Ortiz was following this nodule.  Records were requested from [facility] today.  They sent a CT scan from 4/14/2014 and 7/9/2014.  The patient states she is fairly sure she has had another CT since then.      Primary Care Cardiac Diagnostic Constellation: The patient is here today for a follow-up visit.      Her Hypertension is unstable.   Medication(s): Lisinopril, HCTZ, and Aspirin.   The patient is adherent with her medication regimen. She denies medication side effects.      Interval Events: The patient was seen on 6/28/2019.  Her blood pressure had been elevated at several physician offices.  Her Lisinopril was increased from 10 mg daily to 20 mg daily.  The patient states she bought a blood pressure cuff and began checking her blood pressure.  In addition, she had a friend is a nurse check it several times this past weekend in [place].  Between July 16 and July 20, her blood pressure ran from 120-180 / .   On 7/20/2019, the patient called me after hours reporting a blood pressure of  180's / 100's.  Hydrochlorothiazide 25 mg was added.  On 7/21/2019, the patient's blood pressure was initially 148/96, but went up to 210/110.  She contacted Dr. Galloway after hours who  told her to take a second Lisinopril 20 mg dose.  Her blood pressure did not go down, and she went to the ED.  Records have been received and reviewed.  She does not recall her initial blood pressure in the ED, but it did go up to 225/116.  She was given Norvasc 5 mg in the ED.  Labs were not done.  There were no medication changes made.  It was 194/96 at discharge.  On 6/28/2019, LDL Cholesterol was 132 and Triglycerides 195.  The patient denies any recent unusual stress.     Symptoms: Denies chest pain, dyspnea, MELISSA, orthopnea, PND, palpitations, syncope, lower extremity edema, intermittent leg claudication, lightheadedness, and dizziness.   Associated symptoms: No significant weight change since last visit.  No fatigue, headaches, polyuria, polydipsia, arthralgias, myalgias, visual impairment, memory loss, concentration issues, or focal neurological deficit.      Lifestyle and Disease Management: Diet: She consumes a diverse and healthy diet.  However, she does drink 1/2-1 bottle of wine per day.  Weight Issues: She has weight concerns. Exercise: She exercises regularly. She exercises 7 times per week, except the last 3 weeks. Exercise includes walking and yard work.   Smoking: She does not use tobacco.       Current Outpatient Medications on File Prior to Visit   Medication Sig Dispense Refill   • aspirin 81 MG tablet Take 3 tablets by mouth daily.     • calcium citrate-vitamin d (CALCITRATE) 315-250 MG-UNIT tablet tablet Take  by mouth Daily.     • Docusate Calcium (STOOL SOFTENER PO) 2 capsules Daily.     • FLUoxetine (PROzac) 20 MG capsule TAKE ONE CAPSULE BY MOUTH EVERY DAY 90 capsule 1   • fluticasone (FLONASE) 50 MCG/ACT nasal spray into each nostril daily.     • hydrochlorothiazide (HYDRODIURIL) 25 MG tablet Take 25 mg by mouth Daily.     • montelukast (SINGULAIR) 10 MG tablet TAKE 1 TABLET BY MOUTH EVERY DAY 30 tablet 9   • Multiple Vitamins-Minerals (MULTI VITAMIN/MINERALS PO) Take  by mouth daily.      • Probiotic Product (ALIGN) 4 MG capsule 1 capsule Daily.     • [DISCONTINUED] lisinopril (PRINIVIL,ZESTRIL) 20 MG tablet Take 1 tablet by mouth Daily. 90 tablet 3   • Ibuprofen-diphenhydrAMINE Cit (EQL IBUPROFEN PM PO) Take  by mouth.     • omeprazole (priLOSEC) 20 MG capsule 1 capsule Daily.     • [DISCONTINUED] Loratadine-Pseudoephedrine (CLARITIN-D 12 HOUR PO) Daily.       Current Facility-Administered Medications on File Prior to Visit   Medication Dose Route Frequency Provider Last Rate Last Dose   • [COMPLETED] amLODIPine (NORVASC) tablet 5 mg  5 mg Oral Once Joesph Flower MD   5 mg at 07/21/19 2127       Current outpatient and discharge medications have been reconciled for the patient.  Reviewed by: Eun Mcmanus MD        The following portions of the patient's history were reviewed and updated as appropriate: allergies, current medications, past family history, past medical history, past social history, past surgical history and problem list.    Review of Systems   Constitutional: Negative for fatigue and unexpected weight change.   Eyes: Negative for visual disturbance.   Respiratory: Negative for cough, shortness of breath and wheezing.    Cardiovascular: Negative for chest pain, palpitations and leg swelling.        No MELISSA, orthopnea, or claudication.   Gastrointestinal: Negative for abdominal pain, blood in stool, constipation, diarrhea, nausea and vomiting.        Denies melena.   Endocrine: Negative for polydipsia and polyuria.   Musculoskeletal: Negative for arthralgias and myalgias.   Neurological: Negative for dizziness, syncope, light-headedness and headaches.        No memory issues.   Psychiatric/Behavioral: Negative for decreased concentration.         Objective       Blood pressure 160/92, pulse 74, temperature 97.9 °F (36.6 °C), temperature source Temporal, resp. rate 20, weight 79.4 kg (175 lb).      Physical Exam   Constitutional:   Overweight.   Neck: Normal range of motion.  Neck supple. Carotid bruit is not present. No thyromegaly present.   Cardiovascular: Normal rate, regular rhythm, normal heart sounds and intact distal pulses. Exam reveals no gallop and no friction rub.   No murmur heard.  No peripheral edema.   Pulmonary/Chest: Effort normal and breath sounds normal.   Abdominal: Soft. Bowel sounds are normal. She exhibits no distension, no abdominal bruit and no mass. There is no hepatosplenomegaly. There is no tenderness.   Psychiatric: She has a normal mood and affect.   Nursing note and vitals reviewed.      ECG 12 Lead  Date/Time: 7/22/2019 3:59 PM  Performed by: Eun Mcmanus MD  Authorized by: Eun Mcmanus MD   Comparison: not compared with previous ECG   Previous ECG: no previous ECG available  Rhythm: sinus bradycardia  Ectopy comments: None.  Rate: normal  Conduction: conduction normal  ST Segments: ST segments normal  T Waves: T waves normal  QRS axis: normal  Other: no other findings    Clinical impression: normal ECG            Assessment / Plan:  Ksenia was seen today for hypertension.    Diagnoses and all orders for this visit:    Hypertension, uncontrolled  -     Adult Transthoracic Echo Complete W/ Cont if Necessary Per Protocol; Future  -     ECG 12 Lead  -     lisinopril (PRINIVIL,ZESTRIL) 40 MG tablet; Take 1 tablet by mouth Daily (increased dose).   Continue HCTZ.   Continue heart healthy diet and exercise, and decrease alcohol intake.    Multiple lung nodules on CT  -     CT Chest With Contrast; Future    Ascending aorta dilatation (CMS/HCC)  -     Adult Transthoracic Echo Complete W/ Cont if Necessary Per Protocol; Future          Return in about 2 weeks (around 8/5/2019) for Recheck BP, non-fasting.

## 2019-07-22 NOTE — TELEPHONE ENCOUNTER
Records received.  Last CT chest received is from July 2014.  The patient was seen today and states she has had one since then and will attempt to get the records.  I did go ahead and schedule another CT of the chest today.    Patient's blood pressure medications were adjusted at her appointment today.

## 2019-07-25 ENCOUNTER — OFFICE VISIT (OUTPATIENT)
Dept: INTERNAL MEDICINE | Facility: CLINIC | Age: 59
End: 2019-07-25

## 2019-07-25 VITALS
DIASTOLIC BLOOD PRESSURE: 86 MMHG | HEART RATE: 76 BPM | TEMPERATURE: 97 F | BODY MASS INDEX: 29.91 KG/M2 | RESPIRATION RATE: 21 BRPM | SYSTOLIC BLOOD PRESSURE: 130 MMHG | WEIGHT: 177 LBS

## 2019-07-25 DIAGNOSIS — I10 ESSENTIAL HYPERTENSION: Primary | ICD-10-CM

## 2019-07-25 DIAGNOSIS — Z79.899 HIGH RISK MEDICATION USE: ICD-10-CM

## 2019-07-25 DIAGNOSIS — R91.8 PULMONARY NODULES: ICD-10-CM

## 2019-07-25 LAB
ANION GAP SERPL CALCULATED.3IONS-SCNC: 13.2 MMOL/L (ref 5–15)
BUN BLD-MCNC: 23 MG/DL (ref 6–20)
BUN/CREAT SERPL: 27.1 (ref 7–25)
CALCIUM SPEC-SCNC: 10.2 MG/DL (ref 8.6–10.5)
CHLORIDE SERPL-SCNC: 94 MMOL/L (ref 98–107)
CO2 SERPL-SCNC: 26.8 MMOL/L (ref 22–29)
CREAT BLD-MCNC: 0.85 MG/DL (ref 0.57–1)
GFR SERPL CREATININE-BSD FRML MDRD: 69 ML/MIN/1.73
GLUCOSE BLD-MCNC: 111 MG/DL (ref 65–99)
POTASSIUM BLD-SCNC: 5 MMOL/L (ref 3.5–5.2)
SODIUM BLD-SCNC: 134 MMOL/L (ref 136–145)

## 2019-07-25 PROCEDURE — 99213 OFFICE O/P EST LOW 20 MIN: CPT | Performed by: INTERNAL MEDICINE

## 2019-07-25 PROCEDURE — 80048 BASIC METABOLIC PNL TOTAL CA: CPT | Performed by: INTERNAL MEDICINE

## 2019-07-25 PROCEDURE — 36415 COLL VENOUS BLD VENIPUNCTURE: CPT | Performed by: INTERNAL MEDICINE

## 2019-07-25 NOTE — PROGRESS NOTES
Subjective       Ksenia Ratliff is a 58 y.o. female.     Chief Complaint   Patient presents with   • Hypertension, uncontrolled       History obtained from the patient.      History of Present Illness     The patient has found a CT Chest done on 4/19/2017, on her [facility] portal.  This shows the right middle lobe nodule to be slightly smaller than the previous CT.  She will print a copy and drop it off here for her chart.    Primary [facility]: The patient is here today for a follow-up visit.      Her Hypertension is unstable, but improved.   Medication(s): Lisinopril, HCTZ, and Aspirin.   The patient is adherent with her medication regimen. She denies medication side effects.      Interval Events: The patient was seen on 6/28/2019.  Her blood pressure had been elevated at several physician offices.  Her Lisinopril was increased from 10 mg daily to 20 mg daily.  Between July 16 and July 20, her blood pressure ran from 120-180 / .   On 7/20/2019,  Hydrochlorothiazide 25 mg was added over the phone.  The next day, she contacted Dr. Galloway after hours with an elevated blood pressure and was instructed to take a second Lisinopril 20 mg dose.  Her blood pressure did not go down, and she went to the ED. There were no medication changes made.    The patient was seen on 7/22/2019 in the office. Lisinopril was increased to 40 mg daily.  Hydrochlorothiazide was continued.  Blood pressure has significantly improved.  Since last visit it has been running 126-144 / 86-90 (blood pressure log reviewed).     Symptoms: Denies chest pain, dyspnea, MELISSA, orthopnea, PND, palpitations, syncope, lower extremity edema, intermittent leg claudication, lightheadedness, and dizziness.   Associated symptoms: No significant weight change since last visit.  No fatigue, headaches, polyuria, polydipsia, arthralgias, myalgias, visual impairment, memory loss, concentration issues, or focal  neurological deficit.      Lifestyle and Disease Management: Diet: She consumes a diverse and healthy diet.  However, she does drink 1/2-1 bottle of wine per day.  Weight Issues: She has weight concerns. Exercise: She exercises regularly. She exercises 7 times per week, except the last 3 weeks. Exercise includes walking and yard work.   Smoking: She does not use tobacco.        Current Outpatient Medications on File Prior to Visit   Medication Sig Dispense Refill   • aspirin 81 MG tablet Take 3 tablets by mouth daily.     • calcium citrate-vitamin d (CALCITRATE) 315-250 MG-UNIT tablet tablet Take  by mouth Daily.     • Docusate Calcium (STOOL SOFTENER PO) 2 capsules Daily.     • FLUoxetine (PROzac) 20 MG capsule TAKE ONE CAPSULE BY MOUTH EVERY DAY 90 capsule 1   • fluticasone (FLONASE) 50 MCG/ACT nasal spray into each nostril daily.     • hydrochlorothiazide (HYDRODIURIL) 25 MG tablet Take 25 mg by mouth Daily.     • Ibuprofen-diphenhydrAMINE Cit (EQL IBUPROFEN PM PO) Take  by mouth.     • lisinopril (PRINIVIL,ZESTRIL) 40 MG tablet Take 1 tablet by mouth Daily. 30 tablet 5   • montelukast (SINGULAIR) 10 MG tablet TAKE 1 TABLET BY MOUTH EVERY DAY 30 tablet 9   • Multiple Vitamins-Minerals (MULTI VITAMIN/MINERALS PO) Take  by mouth daily.     • omeprazole (priLOSEC) 20 MG capsule 1 capsule Daily.     • Probiotic Product (ALIGN) 4 MG capsule 1 capsule Daily.       No current facility-administered medications on file prior to visit.        Current outpatient and discharge medications have been reconciled for the patient.  Reviewed by: Eun Mcmanus MD        The following portions of the patient's history were reviewed and updated as appropriate: allergies, current medications, past family history, past medical history, past social history, past surgical history and problem list.    Review of Systems      Objective       Blood pressure 130/86, pulse 76, temperature 97 °F (36.1 °C), temperature source Temporal, resp.  rate 21, weight 80.3 kg (177 lb).      Physical Exam   Constitutional:   Overweight.   Neck: Carotid bruit is not present.   Cardiovascular: Normal rate, regular rhythm and normal heart sounds.   No murmur heard.  Pulmonary/Chest: Effort normal and breath sounds normal.   Neurological: She is alert.   Psychiatric: She has a normal mood and affect.   Nursing note and vitals reviewed.      Assessment / Plan:  Ksenia was seen today for hypertension, uncontrolled.    Diagnoses and all orders for this visit:    Essential hypertension   Continue current medication(s) as noted in the history of present illness.    Pulmonary nodules  -     Ambulatory Referral to Pulmonology (Pulmonary Nodule Clinic)    High risk medication use  -     Basic Metabolic Panel          Return in about 3 months (around 10/25/2019) for Recheck HTN , fasting.

## 2019-07-30 ENCOUNTER — APPOINTMENT (OUTPATIENT)
Dept: CT IMAGING | Facility: HOSPITAL | Age: 59
End: 2019-07-30

## 2019-07-31 ENCOUNTER — HOSPITAL ENCOUNTER (OUTPATIENT)
Dept: CARDIOLOGY | Facility: HOSPITAL | Age: 59
Discharge: HOME | End: 2019-07-31

## 2019-07-31 ENCOUNTER — HOSPITAL ENCOUNTER (OUTPATIENT)
Dept: CT IMAGING | Facility: HOSPITAL | Age: 59
Discharge: HOME | End: 2019-07-31
Admitting: INTERNAL MEDICINE

## 2019-07-31 DIAGNOSIS — I10 HYPERTENSION, UNCONTROLLED: ICD-10-CM

## 2019-07-31 DIAGNOSIS — I77.810 ASCENDING AORTA DILATATION (HCC): ICD-10-CM

## 2019-07-31 DIAGNOSIS — R91.8 MULTIPLE LUNG NODULES ON CT: ICD-10-CM

## 2019-07-31 LAB
BH CV ECHO MEAS - AO MAX PG (FULL): 4.3 MMHG
BH CV ECHO MEAS - AO MAX PG: 11 MMHG
BH CV ECHO MEAS - AO MEAN PG (FULL): 2 MMHG
BH CV ECHO MEAS - AO MEAN PG: 5 MMHG
BH CV ECHO MEAS - AO ROOT AREA (BSA CORRECTED): 1.6
BH CV ECHO MEAS - AO ROOT AREA: 7.1 CM^2
BH CV ECHO MEAS - AO ROOT DIAM: 3 CM
BH CV ECHO MEAS - AO V2 MAX: 165 CM/SEC
BH CV ECHO MEAS - AO V2 MEAN: 99 CM/SEC
BH CV ECHO MEAS - AO V2 VTI: 33.5 CM
BH CV ECHO MEAS - ASC AORTA: 3.1 CM
BH CV ECHO MEAS - AVA(I,A): 2.4 CM^2
BH CV ECHO MEAS - AVA(I,D): 2.4 CM^2
BH CV ECHO MEAS - AVA(V,A): 2.2 CM^2
BH CV ECHO MEAS - AVA(V,D): 2.2 CM^2
BH CV ECHO MEAS - BSA(HAYCOCK): 1.9 M^2
BH CV ECHO MEAS - BSA: 1.9 M^2
BH CV ECHO MEAS - BZI_BMI: 30.4 KILOGRAMS/M^2
BH CV ECHO MEAS - BZI_METRIC_HEIGHT: 162.6 CM
BH CV ECHO MEAS - BZI_METRIC_WEIGHT: 80.3 KG
BH CV ECHO MEAS - EDV(CUBED): 93 ML
BH CV ECHO MEAS - EDV(MOD-SP2): 100 ML
BH CV ECHO MEAS - EDV(MOD-SP4): 84 ML
BH CV ECHO MEAS - EDV(TEICH): 93.9 ML
BH CV ECHO MEAS - EF(CUBED): 70.4 %
BH CV ECHO MEAS - EF(MOD-BP): 62 %
BH CV ECHO MEAS - EF(MOD-SP2): 59 %
BH CV ECHO MEAS - EF(MOD-SP4): 59.5 %
BH CV ECHO MEAS - EF(TEICH): 62.1 %
BH CV ECHO MEAS - ESV(CUBED): 27.5 ML
BH CV ECHO MEAS - ESV(MOD-SP2): 41 ML
BH CV ECHO MEAS - ESV(MOD-SP4): 34 ML
BH CV ECHO MEAS - ESV(TEICH): 35.6 ML
BH CV ECHO MEAS - FS: 33.3 %
BH CV ECHO MEAS - IVS/LVPW: 1
BH CV ECHO MEAS - IVSD: 0.83 CM
BH CV ECHO MEAS - LA DIMENSION: 3.3 CM
BH CV ECHO MEAS - LA/AO: 1.1
BH CV ECHO MEAS - LAD MAJOR: 4.3 CM
BH CV ECHO MEAS - LAT PEAK E' VEL: 11.2 CM/SEC
BH CV ECHO MEAS - LATERAL E/E' RATIO: 6.8
BH CV ECHO MEAS - LV DIASTOLIC VOL/BSA (35-75): 45.2 ML/M^2
BH CV ECHO MEAS - LV MASS(C)D: 117.9 GRAMS
BH CV ECHO MEAS - LV MASS(C)DI: 63.5 GRAMS/M^2
BH CV ECHO MEAS - LV MAX PG: 6.7 MMHG
BH CV ECHO MEAS - LV MEAN PG: 3 MMHG
BH CV ECHO MEAS - LV SYSTOLIC VOL/BSA (12-30): 18.3 ML/M^2
BH CV ECHO MEAS - LV V1 MAX: 129 CM/SEC
BH CV ECHO MEAS - LV V1 MEAN: 77 CM/SEC
BH CV ECHO MEAS - LV V1 VTI: 28.9 CM
BH CV ECHO MEAS - LVIDD: 4.5 CM
BH CV ECHO MEAS - LVIDS: 3 CM
BH CV ECHO MEAS - LVLD AP2: 8.4 CM
BH CV ECHO MEAS - LVLD AP4: 7.5 CM
BH CV ECHO MEAS - LVLS AP2: 6.8 CM
BH CV ECHO MEAS - LVLS AP4: 6.7 CM
BH CV ECHO MEAS - LVOT AREA (M): 2.8 CM^2
BH CV ECHO MEAS - LVOT AREA: 2.8 CM^2
BH CV ECHO MEAS - LVOT DIAM: 1.9 CM
BH CV ECHO MEAS - LVPWD: 0.8 CM
BH CV ECHO MEAS - MED PEAK E' VEL: 7.6 CM/SEC
BH CV ECHO MEAS - MEDIAL E/E' RATIO: 10
BH CV ECHO MEAS - MV A MAX VEL: 73.1 CM/SEC
BH CV ECHO MEAS - MV DEC TIME: 0.26 SEC
BH CV ECHO MEAS - MV E MAX VEL: 76 CM/SEC
BH CV ECHO MEAS - MV E/A: 1
BH CV ECHO MEAS - PA ACC SLOPE: 886 CM/SEC^2
BH CV ECHO MEAS - PA ACC TIME: 0.09 SEC
BH CV ECHO MEAS - PA PR(ACCEL): 37.6 MMHG
BH CV ECHO MEAS - RAP SYSTOLE: 3 MMHG
BH CV ECHO MEAS - RVSP: 25.7 MMHG
BH CV ECHO MEAS - SI(AO): 127.5 ML/M^2
BH CV ECHO MEAS - SI(CUBED): 35.2 ML/M^2
BH CV ECHO MEAS - SI(LVOT): 44.1 ML/M^2
BH CV ECHO MEAS - SI(MOD-SP2): 31.8 ML/M^2
BH CV ECHO MEAS - SI(MOD-SP4): 26.9 ML/M^2
BH CV ECHO MEAS - SI(TEICH): 31.4 ML/M^2
BH CV ECHO MEAS - SV(AO): 236.8 ML
BH CV ECHO MEAS - SV(CUBED): 65.4 ML
BH CV ECHO MEAS - SV(LVOT): 81.9 ML
BH CV ECHO MEAS - SV(MOD-SP2): 59 ML
BH CV ECHO MEAS - SV(MOD-SP4): 50 ML
BH CV ECHO MEAS - SV(TEICH): 58.3 ML
BH CV ECHO MEAS - TAPSE (>1.6): 3.1 CM2
BH CV ECHO MEAS - TR MAX PG: 22.7 MMHG
BH CV ECHO MEAS - TR MAX VEL: 238 CM/SEC
BH CV ECHO MEASUREMENTS AVERAGE E/E' RATIO: 8.09
BH CV XLRA - RV BASE: 3.2 CM
BH CV XLRA - RV LENGTH: 5.2 CM
BH CV XLRA - RV MID: 3 CM
LEFT ATRIUM VOLUME INDEX: 24.8 ML/M^2
LEFT ATRIUM VOLUME: 46 ML
LV EF 2D ECHO EST: 60 %

## 2019-07-31 PROCEDURE — 25010000002 IOPAMIDOL 61 % SOLUTION: Performed by: INTERNAL MEDICINE

## 2019-07-31 PROCEDURE — 71260 CT THORAX DX C+: CPT

## 2019-07-31 PROCEDURE — 93306 TTE W/DOPPLER COMPLETE: CPT | Performed by: INTERNAL MEDICINE

## 2019-07-31 PROCEDURE — 93306 TTE W/DOPPLER COMPLETE: CPT

## 2019-07-31 RX ADMIN — IOPAMIDOL 95 ML: 612 INJECTION, SOLUTION INTRAVENOUS at 15:15

## 2019-08-02 RX ORDER — HYDROCHLOROTHIAZIDE 25 MG/1
TABLET ORAL
Qty: 30 TABLET | Refills: 0 | Status: SHIPPED | OUTPATIENT
Start: 2019-08-02 | End: 2019-09-04 | Stop reason: SDUPTHER

## 2019-09-05 RX ORDER — HYDROCHLOROTHIAZIDE 25 MG/1
TABLET ORAL
Qty: 30 TABLET | Refills: 5 | Status: SHIPPED | OUTPATIENT
Start: 2019-09-05 | End: 2019-09-13 | Stop reason: SDUPTHER

## 2019-09-09 ENCOUNTER — TELEPHONE (OUTPATIENT)
Dept: INTERNAL MEDICINE | Facility: CLINIC | Age: 59
End: 2019-09-09

## 2019-09-09 RX ORDER — NITROFURANTOIN 25; 75 MG/1; MG/1
100 CAPSULE ORAL 2 TIMES DAILY
Qty: 6 CAPSULE | Refills: 0 | Status: SHIPPED | OUTPATIENT
Start: 2019-09-09 | End: 2019-09-12

## 2019-09-09 RX ORDER — NITROFURANTOIN 25; 75 MG/1; MG/1
100 CAPSULE ORAL 2 TIMES DAILY
Qty: 6 CAPSULE | Refills: 0 | Status: SHIPPED | OUTPATIENT
Start: 2019-09-09 | End: 2019-09-09 | Stop reason: SDUPTHER

## 2019-09-09 NOTE — TELEPHONE ENCOUNTER
Pt is out of town in [place] and believes she has a UTI. Gaby would like a call at [phone].     Pharmacy preferred is [facility] [id number], [place] [address]

## 2019-09-09 NOTE — TELEPHONE ENCOUNTER
She states she is c/o frequency and burning for 2 days . She states she has been having some low back pain .  No fever      She states she does not want to go to a UC she states she is on a tight schdule and doesn't really have time to go to the UC .    She gets back in 2 days  And could come in then

## 2019-09-13 RX ORDER — HYDROCHLOROTHIAZIDE 25 MG/1
TABLET ORAL
Qty: 30 TABLET | Refills: 5 | Status: SHIPPED | OUTPATIENT
Start: 2019-09-13 | End: 2020-06-15

## 2019-09-17 ENCOUNTER — DOCUMENTATION (OUTPATIENT)
Dept: ONCOLOGY | Facility: CLINIC | Age: 59
End: 2019-09-17

## 2019-09-17 ENCOUNTER — OFFICE VISIT (OUTPATIENT)
Dept: PULMONOLOGY | Facility: CLINIC | Age: 59
End: 2019-09-17

## 2019-09-17 VITALS
BODY MASS INDEX: 31.76 KG/M2 | DIASTOLIC BLOOD PRESSURE: 80 MMHG | HEART RATE: 80 BPM | OXYGEN SATURATION: 98 % | HEIGHT: 64 IN | SYSTOLIC BLOOD PRESSURE: 136 MMHG | TEMPERATURE: 98.6 F | WEIGHT: 186 LBS

## 2019-09-17 DIAGNOSIS — R91.1 LUNG NODULE: Primary | ICD-10-CM

## 2019-09-17 PROCEDURE — 94726 PLETHYSMOGRAPHY LUNG VOLUMES: CPT | Performed by: INTERNAL MEDICINE

## 2019-09-17 PROCEDURE — 94729 DIFFUSING CAPACITY: CPT | Performed by: INTERNAL MEDICINE

## 2019-09-17 PROCEDURE — 99205 OFFICE O/P NEW HI 60 MIN: CPT | Performed by: INTERNAL MEDICINE

## 2019-09-17 PROCEDURE — 94060 EVALUATION OF WHEEZING: CPT | Performed by: INTERNAL MEDICINE

## 2019-09-17 RX ORDER — ALBUTEROL SULFATE 90 UG/1
2 AEROSOL, METERED RESPIRATORY (INHALATION) ONCE
Status: COMPLETED | OUTPATIENT
Start: 2019-09-17 | End: 2019-09-17

## 2019-09-17 RX ADMIN — ALBUTEROL SULFATE 4 PUFF: 90 AEROSOL, METERED RESPIRATORY (INHALATION) at 12:18

## 2019-09-17 NOTE — PROGRESS NOTES
Met patient in lung nodule clinic with Dr. HORACIO Mason. She has limited smoking history in high school and college. She denies occupational exposures. She c/o cough and seasonal allergies. History of breast cancer with lumpectomy only in 2011. According to patient CT chest without symptoms done and revealed 1cm GGN. Scans and PFT's reviewed. Per Dr. Mason repeat CT chest x2mo from now. She v/u and agreeable with plan. She knows to call with questions or concerns.

## 2019-09-17 NOTE — PROGRESS NOTES
CC:    Abnormal CT    HPI:    58 y/o WF w/ h/o minimal smoking in high school and college, allergic rhinitis, HTN on ACE, GERD, left sided breast cancer s/p lumpectomy  (did not need chemo or RT), who presents today for evaluation of lung nodule.  Patient had prior imaging showing lung nodules consistent with granulomatous disease / histo.  She recently had a repeat CT for follow up ordered by her PCP.  There was a new GG nodule and she was sent for evaluation.  No symptoms attributable to this nodule.  She does have ongoing allergic rhinitis.    PMH:    Past Medical History:   Diagnosis Date   • Allergic rhinitis    • Anemia    • Benign colonic polyp     Description: dx    • Elevated C-reactive protein (CRP)     dx  (normal homocysteine level).  3/14- normal coronary calcium score 1.0   • Gastroesophageal reflux disease     Description: dx - Dr Lozano   • History of echocardiogram 2019    normal (no aortic dilation). EF 60%   • History of esophagogastroduodenoscopy 2011    reflux esophagitis, gastritis, and gastric polyp   • History of varicella    • Hypertension     Description: dx .  Calcium Score 1.0 (3/14).   • Internal hemorrhoids     Dx by Colonoscopy    • Irritable bowel syndrome     Description: dx approx - Dr. Lozano   • Lung nodule, solitary     Dx - RUL   • Malignant neoplasm of breast (CMS/HCC)     Description: Left DCIS dx 3/11   • Menopausal state     .   • Screening for cardiovascular condition 2019    Coronary Calcium Score 0   • Sigmoid diverticulosis     Dx by Colonoscopy      PSH:    Past Surgical History:   Procedure Laterality Date   • APPENDECTOMY      approx   • BREAST LUMPECTOMY Left 2011    DCIS   •  SECTION       and      FH:    Family History   Problem Relation Age of Onset   • Coronary artery disease Mother         50's   • Diabetes Mother    • Hyperlipidemia Mother    • Hypertension Mother    • Coronary artery  disease Father         30's   • Bipolar disorder Sister    • Diabetes Sister    • Hypertension Sister    • Diabetes Sister    • Hypertension Sister      SH:    Social History     Socioeconomic History   • Marital status:      Spouse name: Not on file   • Number of children: 2   • Years of education: Not on file   • Highest education level: Not on file   Occupational History   • Occupation: Retired   Tobacco Use   • Smoking status: Former Smoker     Packs/day: 1.00     Years: 10.00     Pack years: 10.00     Types: Cigarettes     Last attempt to quit:      Years since quittin.7   • Smokeless tobacco: Never Used   • Tobacco comment: 1/2 ppd x 4-6 years in high school   Substance and Sexual Activity   • Alcohol use: Yes     Comment: 1-3 glasses of wine daily   • Drug use: No   • Sexual activity: Yes     Partners: Male     Birth control/protection: Post-menopausal     ALLERGIES:    Allergies   Allergen Reactions   • Bupropion GI Intolerance   • Levofloxacin    • Tamoxifen Other (See Comments)   • Penicillins Rash     MEDICATIONS:      Current Outpatient Medications:   •  aspirin 81 MG tablet, Take 3 tablets by mouth daily., Disp: , Rfl:   •  calcium citrate-vitamin d (CALCITRATE) 315-250 MG-UNIT tablet tablet, Take  by mouth Daily., Disp: , Rfl:   •  Docusate Calcium (STOOL SOFTENER PO), 2 capsules Daily., Disp: , Rfl:   •  FLUoxetine (PROzac) 20 MG capsule, TAKE ONE CAPSULE BY MOUTH EVERY DAY, Disp: 90 capsule, Rfl: 1  •  fluticasone (FLONASE) 50 MCG/ACT nasal spray, into each nostril daily., Disp: , Rfl:   •  hydrochlorothiazide (HYDRODIURIL) 25 MG tablet, TAKE 1 TABLET EVERY DAY, Disp: 30 tablet, Rfl: 5  •  Ibuprofen-diphenhydrAMINE Cit (EQL IBUPROFEN PM PO), Take  by mouth., Disp: , Rfl:   •  lisinopril (PRINIVIL,ZESTRIL) 40 MG tablet, Take 1 tablet by mouth Daily. (Patient taking differently: Take 30 mg by mouth Daily.), Disp: 30 tablet, Rfl: 5  •  montelukast (SINGULAIR) 10 MG tablet, TAKE 1 TABLET  BY MOUTH EVERY DAY, Disp: 30 tablet, Rfl: 9  •  Multiple Vitamins-Minerals (MULTI VITAMIN/MINERALS PO), Take  by mouth daily., Disp: , Rfl:   •  omeprazole (priLOSEC) 20 MG capsule, 1 capsule Daily., Disp: , Rfl:   •  Probiotic Product (ALIGN) 4 MG capsule, 1 capsule Daily., Disp: , Rfl:   No current facility-administered medications for this visit.   ROS:  Per HPI, otherwise all systems reviewed and negative.    DIAGNOSTIC DATA (Reviewed and interpreted by me unless otherwise specified):    CT Chest 7/31/19 & 4/10/14 - scattered calcified granulomas, calcified mediastinal LN's, calcifications in spleen consistent with histo.  All of these areas stable.  Scarring in RML improving from 2014.  There are some new mild tree-in-bud opacities in RLL.  Very subtle GGO in RUL that is new.    PFT 9/17/19 - borderline mild obstruction, no change w/ BD, no restriction, borderline air trapping, mild dec DLCO corrects for alveolar volume    Vitals:    09/17/19 1222   BP: 136/80   Pulse: 80   Temp: 98.6 °F (37 °C)   SpO2: 98%       Physical Exam   Constitutional: Oriented to person, place, and time. Appears well-developed and well-nourished.   Head: Normocephalic and atraumatic.   Nose: Nose normal.   Mouth/Throat: Oropharynx is clear and moist.   Eyes: Conjunctivae are normal.  Pupils normal.  Neck: No tracheal deviation present.   Cardiovascular: Normal rate, regular rhythm, normal heart sounds and intact distal pulses.  Exam reveals no gallop and no friction rub.  No thrill.  No JVD.  No edema.  No murmur heard.  Pulmonary/Chest: Effort normal and breath sounds normal.  No tenderness to palpation.  No clubbing.   Abdominal: Soft. Bowel sounds are normal. No distension. No tenderness. There is no guarding.   Musculoskeletal: Normal range of motion.  No tenderness.  Lymphadenopathy:  No cervical adenopathy.   Neurological:  No new focal neurological deficits observed   Skin: Skin is warm and dry. No rash noted.   Psychiatric:  Normal mood and affect.  Behavior is normal. Judgment normal.    Assessment/Plan     1)  Abnormal CT - new GGO RUL.  Repeat CT in 3 months per guidelines, that would be near end of Oct / beginning of Nov.  Would like to avoid further scanning if this GGO is resolved.    2) Allergic Rhinitis - max out flonase, add anti-histamine    RTC 2 months    Clarke Mason MD  Pulmonology and Critical Care Medicine  09/17/19 12:36 PM  Electronically Signed    C.C.:  Eun Mcmanus MD, Eun Mcmanus MD

## 2019-10-14 ENCOUNTER — OFFICE VISIT (OUTPATIENT)
Dept: INTERNAL MEDICINE | Facility: CLINIC | Age: 59
End: 2019-10-14

## 2019-10-14 VITALS
RESPIRATION RATE: 20 BRPM | HEART RATE: 80 BPM | TEMPERATURE: 97.3 F | SYSTOLIC BLOOD PRESSURE: 110 MMHG | WEIGHT: 185.25 LBS | BODY MASS INDEX: 31.8 KG/M2 | DIASTOLIC BLOOD PRESSURE: 70 MMHG

## 2019-10-14 DIAGNOSIS — Z23 NEED FOR IMMUNIZATION AGAINST INFLUENZA: ICD-10-CM

## 2019-10-14 DIAGNOSIS — I10 ESSENTIAL HYPERTENSION: Primary | ICD-10-CM

## 2019-10-14 DIAGNOSIS — R91.1 LUNG NODULE, SOLITARY: ICD-10-CM

## 2019-10-14 DIAGNOSIS — E78.1 HYPERTRIGLYCERIDEMIA: ICD-10-CM

## 2019-10-14 DIAGNOSIS — K21.9 GASTROESOPHAGEAL REFLUX DISEASE WITHOUT ESOPHAGITIS: ICD-10-CM

## 2019-10-14 DIAGNOSIS — K63.5 BENIGN COLONIC POLYP: ICD-10-CM

## 2019-10-14 LAB
ALBUMIN SERPL-MCNC: 4.6 G/DL (ref 3.5–5.2)
ALBUMIN/GLOB SERPL: 1.8 G/DL
ALP SERPL-CCNC: 50 U/L (ref 39–117)
ALT SERPL W P-5'-P-CCNC: 16 U/L (ref 1–33)
ANION GAP SERPL CALCULATED.3IONS-SCNC: 13.2 MMOL/L (ref 5–15)
AST SERPL-CCNC: 18 U/L (ref 1–32)
BILIRUB SERPL-MCNC: 0.3 MG/DL (ref 0.2–1.2)
BUN BLD-MCNC: 18 MG/DL (ref 6–20)
BUN/CREAT SERPL: 25.4 (ref 7–25)
CALCIUM SPEC-SCNC: 9.2 MG/DL (ref 8.6–10.5)
CHLORIDE SERPL-SCNC: 103 MMOL/L (ref 98–107)
CHOLEST SERPL-MCNC: 241 MG/DL (ref 0–200)
CO2 SERPL-SCNC: 26.8 MMOL/L (ref 22–29)
CREAT BLD-MCNC: 0.71 MG/DL (ref 0.57–1)
GFR SERPL CREATININE-BSD FRML MDRD: 85 ML/MIN/1.73
GLOBULIN UR ELPH-MCNC: 2.5 GM/DL
GLUCOSE BLD-MCNC: 114 MG/DL (ref 65–99)
HDLC SERPL-MCNC: 54 MG/DL (ref 40–60)
LDLC SERPL CALC-MCNC: 139 MG/DL (ref 0–100)
LDLC/HDLC SERPL: 2.57 {RATIO}
POTASSIUM BLD-SCNC: 4.2 MMOL/L (ref 3.5–5.2)
PROT SERPL-MCNC: 7.1 G/DL (ref 6–8.5)
SODIUM BLD-SCNC: 143 MMOL/L (ref 136–145)
TRIGL SERPL-MCNC: 241 MG/DL (ref 0–150)
VLDLC SERPL-MCNC: 48.2 MG/DL (ref 5–40)

## 2019-10-14 PROCEDURE — 90471 IMMUNIZATION ADMIN: CPT | Performed by: INTERNAL MEDICINE

## 2019-10-14 PROCEDURE — 99214 OFFICE O/P EST MOD 30 MIN: CPT | Performed by: INTERNAL MEDICINE

## 2019-10-14 PROCEDURE — 80053 COMPREHEN METABOLIC PANEL: CPT | Performed by: INTERNAL MEDICINE

## 2019-10-14 PROCEDURE — 36415 COLL VENOUS BLD VENIPUNCTURE: CPT | Performed by: INTERNAL MEDICINE

## 2019-10-14 PROCEDURE — 90686 IIV4 VACC NO PRSV 0.5 ML IM: CPT | Performed by: INTERNAL MEDICINE

## 2019-10-14 PROCEDURE — 80061 LIPID PANEL: CPT | Performed by: INTERNAL MEDICINE

## 2019-10-14 RX ORDER — LORATADINE 10 MG/1
CAPSULE, LIQUID FILLED ORAL
COMMUNITY

## 2019-10-14 NOTE — PATIENT INSTRUCTIONS
Recommend bringing home blood pressure cuff into there office for comparison blood pressure reading with our cuff.    Heart-Healthy Eating Plan  Many factors influence your heart (coronary) health, including eating and exercise habits. Coronary risk increases with abnormal blood fat (lipid) levels. Heart-healthy meal planning includes limiting unhealthy fats, increasing healthy fats, and making other diet and lifestyle changes.  What is my plan?  Your health care provider may recommend that you:  · Limit your fat intake to _________% or less of your total calories each day.  · Limit your saturated fat intake to _________% or less of your total calories each day.  · Limit the amount of cholesterol in your diet to less than _________ mg per day.  What are tips for following this plan?  Cooking  Cook foods using methods other than frying. Baking, boiling, grilling, and broiling are all good options. Other ways to reduce fat include:  · Removing the skin from poultry.  · Removing all visible fats from meats.  · Steaming vegetables in water or broth.  Meal planning    · At meals, imagine dividing your plate into fourths:  ? Fill one-half of your plate with vegetables and green salads.  ? Fill one-fourth of your plate with whole grains.  ? Fill one-fourth of your plate with lean protein foods.  · Eat 4-5 servings of vegetables per day. One serving equals 1 cup raw or cooked vegetable, or 2 cups raw leafy greens.  · Eat 4-5 servings of fruit per day. One serving equals 1 medium whole fruit, ¼ cup dried fruit, ½ cup fresh, frozen, or canned fruit, or ½ cup 100% fruit juice.  · Eat more foods that contain soluble fiber. Examples include apples, broccoli, carrots, beans, peas, and barley. Aim to get 25-30 g of fiber per day.  · Increase your consumption of legumes, nuts, and seeds to 4-5 servings per week. One serving of dried beans or legumes equals ½ cup cooked, 1 serving of nuts is ¼ cup, and 1 serving of seeds equals 1  tablespoon.  Fats  · Choose healthy fats more often. Choose monounsaturated and polyunsaturated fats, such as olive and canola oils, flaxseeds, walnuts, almonds, and seeds.  · Eat more omega-3 fats. Choose salmon, mackerel, sardines, tuna, flaxseed oil, and ground flaxseeds. Aim to eat fish at least 2 times each week.  · Check food labels carefully to identify foods with trans fats or high amounts of saturated fat.  · Limit saturated fats. These are found in animal products, such as meats, butter, and cream. Plant sources of saturated fats include palm oil, palm kernel oil, and coconut oil.  · Avoid foods with partially hydrogenated oils in them. These contain trans fats. Examples are stick margarine, some tub margarines, cookies, crackers, and other baked goods.  · Avoid fried foods.  General information  · Eat more home-cooked food and less restaurant, buffet, and fast food.  · Limit or avoid alcohol.  · Limit foods that are high in starch and sugar.  · Lose weight if you are overweight. Losing just 5-10% of your body weight can help your overall health and prevent diseases such as diabetes and heart disease.  · Monitor your salt (sodium) intake, especially if you have high blood pressure. Talk with your health care provider about your sodium intake.  · Try to incorporate more vegetarian meals weekly.  What foods can I eat?  Fruits  All fresh, canned (in natural juice), or frozen fruits.  Vegetables  Fresh or frozen vegetables (raw, steamed, roasted, or grilled). Green salads.  Grains  Most grains. Choose whole wheat and whole grains most of the time. Rice and pasta, including brown rice and pastas made with whole wheat.  Meats and other proteins  Lean, well-trimmed beef, veal, pork, and lamb. Chicken and turkey without skin. All fish and shellfish. Wild duck, rabbit, pheasant, and venison. Egg whites or low-cholesterol egg substitutes. Dried beans, peas, lentils, and tofu. Seeds and most nuts.  Dairy  Low-fat or  nonfat cheeses, including ricotta and mozzarella. Skim or 1% milk (liquid, powdered, or evaporated). Buttermilk made with low-fat milk. Nonfat or low-fat yogurt.  Fats and oils  Non-hydrogenated (trans-free) margarines. Vegetable oils, including soybean, sesame, sunflower, olive, peanut, safflower, corn, canola, and cottonseed. Salad dressings or mayonnaise made with a vegetable oil.  Beverages  Water (mineral or sparkling). Coffee and tea. Diet carbonated beverages.  Sweets and desserts  Sherbet, gelatin, and fruit ice. Small amounts of dark chocolate.  Limit all sweets and desserts.  Seasonings and condiments  All seasonings and condiments.  The items listed above may not be a complete list of foods and beverages you can eat. Contact a dietitian for more options.  What foods are not recommended?  Fruits  Canned fruit in heavy syrup. Fruit in cream or butter sauce. Fried fruit. Limit coconut.  Vegetables  Vegetables cooked in cheese, cream, or butter sauce. Fried vegetables.  Grains  Breads made with saturated or trans fats, oils, or whole milk. Croissants. Sweet rolls. Donuts. High-fat crackers, such as cheese crackers.  Meats and other proteins  Fatty meats, such as hot dogs, ribs, sausage, momin, rib-eye roast or steak. High-fat deli meats, such as salami and bologna. Caviar. Domestic duck and goose. Organ meats, such as liver.  Dairy  Cream, sour cream, cream cheese, and creamed cottage cheese. Whole milk cheeses. Whole or 2% milk (liquid, evaporated, or condensed). Whole buttermilk. Cream sauce or high-fat cheese sauce. Whole-milk yogurt.  Fats and oils  Meat fat, or shortening. Cocoa butter, hydrogenated oils, palm oil, coconut oil, palm kernel oil. Solid fats and shortenings, including momin fat, salt pork, lard, and butter. Nondairy cream substitutes. Salad dressings with cheese or sour cream.  Beverages  Regular sodas and any drinks with added sugar.  Sweets and desserts  Frosting. Pudding. Cookies. Cakes.  Pies. Milk chocolate or white chocolate. Buttered syrups. Full-fat ice cream or ice cream drinks.  The items listed above may not be a complete list of foods and beverages to avoid. Contact a dietitian for more information.  Summary  · Heart-healthy meal planning includes limiting unhealthy fats, increasing healthy fats, and making other diet and lifestyle changes.  · Lose weight if you are overweight. Losing just 5-10% of your body weight can help your overall health and prevent diseases such as diabetes and heart disease.  · Focus on eating a balance of foods, including fruits and vegetables, low-fat or nonfat dairy, lean protein, nuts and legumes, whole grains, and heart-healthy oils and fats.  This information is not intended to replace advice given to you by your health care provider. Make sure you discuss any questions you have with your health care provider.  Document Released: 09/26/2009 Document Revised: 01/25/2019 Document Reviewed: 01/25/2019  [employer] Interactive Patient Education © 2019 [employer]      Exercising to Lose Weight  Exercise is structured, repetitive physical activity to improve fitness and health. Getting regular exercise is important for everyone. It is especially important if you are overweight. Being overweight increases your risk of heart disease, stroke, diabetes, high blood pressure, and several types of cancer. Reducing your calorie intake and exercising can help you lose weight.  Exercise is usually categorized as moderate or vigorous intensity. To lose weight, most people need to do a certain amount of moderate-intensity or vigorous-intensity exercise each week.  Moderate-intensity exercise    Moderate-intensity exercise is any activity that gets you moving enough to burn at least three times more energy (calories) than if you were sitting.  Examples of moderate exercise include:  · Walking a mile in 15 minutes.  · Doing light yard work.  · Biking at an easy pace.  Most people  should get at least 150 minutes (2 hours and 30 minutes) a week of moderate-intensity exercise to maintain their body weight.  Vigorous-intensity exercise  Vigorous-intensity exercise is any activity that gets you moving enough to burn at least six times more calories than if you were sitting. When you exercise at this intensity, you should be working hard enough that you are not able to carry on a conversation.  Examples of vigorous exercise include:  · Running.  · Playing a team sport, such as football, basketball, and soccer.  · Jumping rope.  Most people should get at least 75 minutes (1 hour and 15 minutes) a week of vigorous-intensity exercise to maintain their body weight.  How can exercise affect me?  When you exercise enough to burn more calories than you eat, you lose weight. Exercise also reduces body fat and builds muscle. The more muscle you have, the more calories you burn. Exercise also:  · Improves mood.  · Reduces stress and tension.  · Improves your overall fitness, flexibility, and endurance.  · Increases bone strength.  The amount of exercise you need to lose weight depends on:  · Your age.  · The type of exercise.  · Any health conditions you have.  · Your overall physical ability.  Talk to your health care provider about how much exercise you need and what types of activities are safe for you.  What actions can I take to lose weight?  Nutrition    · Make changes to your diet as told by your health care provider or diet and nutrition specialist (dietitian). This may include:  ? Eating fewer calories.  ? Eating more protein.  ? Eating less unhealthy fats.  ? Eating a diet that includes fresh fruits and vegetables, whole grains, low-fat dairy products, and lean protein.  ? Avoiding foods with added fat, salt, and sugar.  · Drink plenty of water while you exercise to prevent dehydration or heat stroke.  Activity  · Choose an activity that you enjoy and set realistic goals. Your health care provider  can help you make an exercise plan that works for you.  · Exercise at a moderate or vigorous intensity most days of the week.  ? The intensity of exercise may vary from person to person. You can tell how intense a workout is for you by paying attention to your breathing and heartbeat. Most people will notice their breathing and heartbeat get faster with more intense exercise.  · Do resistance training twice each week, such as:  ? Push-ups.  ? Sit-ups.  ? Lifting weights.  ? Using resistance bands.  · Getting short amounts of exercise can be just as helpful as long structured periods of exercise. If you have trouble finding time to exercise, try to include exercise in your daily routine.  ? Get up, stretch, and walk around every 30 minutes throughout the day.  ? Go for a walk during your lunch break.  ? Park your car farther away from your destination.  ? If you take public transportation, get off one stop early and walk the rest of the way.  ? Make phone calls while standing up and walking around.  ? Take the stairs instead of elevators or escalators.  · Wear comfortable clothes and shoes with good support.  · Do not exercise so much that you hurt yourself, feel dizzy, or get very short of breath.  Where to find more information  · [place] Department of Health and Human Services: www.hhs.gov  · Centers for Disease Control and Prevention (CDC): www.cdc.gov  Contact a health care provider:  · Before starting a new exercise program.  · If you have questions or concerns about your weight.  · If you have a medical problem that keeps you from exercising.  Get help right away if you have any of the following while exercising:  · Injury.  · Dizziness.  · Difficulty breathing or shortness of breath that does not go away when you stop exercising.  · Chest pain.  · Rapid heartbeat.  Summary  · Being overweight increases your risk of heart disease, stroke, diabetes, high blood pressure, and several types of cancer.  · Losing  weight happens when you burn more calories than you eat.  · Reducing the amount of calories you eat in addition to getting regular moderate or vigorous exercise each week helps you lose weight.  This information is not intended to replace advice given to you by your health care provider. Make sure you discuss any questions you have with your health care provider.  Document Released: 01/20/2012 Document Revised: 12/31/2018 Document Reviewed: 12/31/2018  [employer] Interactive Patient Education © 2019 [employer]

## 2019-10-14 NOTE — PROGRESS NOTES
Subjective       Ksenia Ratliff is a 59 y.o. female.     Chief Complaint   Patient presents with   • Hypertension     3 month follow up   fasting        History obtained from the patient.      History of Present Illness        The patient was diagnosed with a right upper lobe Pulmonary Nodule in April 2014, per CT Chest done at [facility], [place], operated by [facility].  CT Chest done at [facility] on 7/9/2014 was read as a stable right middle lobe lung nodule.  CT Chest done on 4/19/2017, at  [facility] portal showed the right middle lobe nodule to be slightly smaller than the previous CT.  On 7/31/2019, CT chest at [facility] showed a right upper lobe 1 cm nodule.  Right middle lobe nodule had decreased in size.  She saw Dr. Mason, who recommended a 3-month follow-up CT.    Primary Care Cardiac Diagnostic [facility]: The patient is here today for a follow-up visit.      Her Hypertension has been stable.   Medication(s): Lisinopril, HCTZ, and Aspirin.   The patient is adherent with her medication regimen. She denies medication side effects.      Interval Events: The patient was seen on 6/28/2019.  Her Lisinopril was increased from 10 mg daily to 20 mg daily.   On 7/20/2019,  Hydrochlorothiazide 25 mg was added over the phone.   The patient was seen on 7/22/2019 in the office. Lisinopril was increased to 40 mg daily.  Hydrochlorothiazide was continued.  Blood pressure has been running 126-150  / 70-90, with scattered more elevated readings (blood pressure log reviewed).  The patient states she self decreased her Lisinopril to 30 mg daily around 8/30/2019, due to dizziness.     Symptoms: Denies chest pain, dyspnea, MELISSA, orthopnea, PND, palpitations, syncope, lower extremity edema, intermittent leg claudication, lightheadedness, and dizziness.   Associated symptoms: Weight up 10 pounds in the last 3 months.  No fatigue, headaches, polyuria, polydipsia, arthralgias, myalgias, visual impairment, memory loss, concentration  issues, or focal neurological deficit.      Lifestyle and Disease Management: Diet: She consumes a diverse and healthy diet.   Weight Issues: She has weight concerns. Exercise: She exercises regularly. She exercises 7 times per week.  Exercise includes walking and yard work.  She states she is overall been less active for the past 2 weeks.  Smoking: She does not use tobacco.     Gastroesophageal Reflux Disease Follow-up: The patient is being seen for a routine clinic follow-up of Gastroesophageal Reflux Disease, which is stable.   Comorbid Illness: Irritable Bowel Syndrome.  Interval Events: None.  Symptoms: No abdominal pain, heartburn, acid regurgitation, nausea, vomiting, hematemesis, dysphagia, odynophagia, hematochezia, melena, early satiety, belching, or bloating.   Associated symptoms: No chronic sore throat, cough, hoarseness, or wheezing.   Medications:   Omeprazole every other day and Prozac for IBS.        Colonic Polyp Follow-up: The patient is being seen for a routine clinic follow-up of Colon Polyp(s), which is stable.   Pertinent Medical History: anal fissure.  Interval Events:  Current diagnosis was determined by Colonoscopy and last 12/13/17, no polyps.   Symptoms:   No abdominal pain, diarrhea, constipation, hematochezia, melena, decreased stool caliber, or change in bowel habits.    Associated symptoms: no rectal prolapse.   Medication:    Align and 2 stool softeners.        Current Outpatient Medications on File Prior to Visit   Medication Sig Dispense Refill   • aspirin 81 MG tablet Take 3 tablets by mouth daily.     • calcium citrate-vitamin d (CALCITRATE) 315-250 MG-UNIT tablet tablet Take  by mouth Daily.     • Docusate Calcium (STOOL SOFTENER PO) 2 capsules Daily.     • FLUoxetine (PROzac) 20 MG capsule TAKE ONE CAPSULE BY MOUTH EVERY DAY 90 capsule 1   • fluticasone (FLONASE) 50 MCG/ACT nasal spray into each nostril daily.     • hydrochlorothiazide (HYDRODIURIL) 25 MG tablet TAKE 1 TABLET  EVERY DAY 30 tablet 5   • Ibuprofen-diphenhydrAMINE Cit (EQL IBUPROFEN PM PO) Take  by mouth.     • Loratadine (CLARITIN) 10 MG capsule Take  by mouth.     • montelukast (SINGULAIR) 10 MG tablet TAKE 1 TABLET BY MOUTH EVERY DAY 30 tablet 9   • Multiple Vitamins-Minerals (MULTI VITAMIN/MINERALS PO) Take  by mouth daily.     • omeprazole (priLOSEC) 20 MG capsule 1 capsule Daily.     • Probiotic Product (ALIGN) 4 MG capsule 1 capsule Daily.     • [DISCONTINUED] lisinopril (PRINIVIL,ZESTRIL) 40 MG tablet Take 1 tablet by mouth Daily. (Patient taking differently: Take 30 mg by mouth Daily.) 30 tablet 5     No current facility-administered medications on file prior to visit.        Current outpatient and discharge medications have been reconciled for the patient.  Reviewed by: Eun Mcmanus MD        The following portions of the patient's history were reviewed and updated as appropriate: allergies, current medications, past family history, past medical history, past social history, past surgical history and problem list.    Review of Systems   Constitutional: Positive for unexpected weight change. Negative for fatigue.   Eyes: Negative for visual disturbance.   Respiratory: Negative for cough, shortness of breath and wheezing.    Cardiovascular: Negative for chest pain, palpitations and leg swelling.        No MELISSA, orthopnea, or claudication.   Gastrointestinal: Negative for abdominal pain, blood in stool, constipation, diarrhea, nausea and vomiting.        Denies melena.   Endocrine: Negative for polydipsia and polyuria.   Musculoskeletal: Negative for arthralgias and myalgias.   Neurological: Negative for dizziness, syncope, light-headedness and headaches.        No memory issues.  Has tingling in her calves at night.   Psychiatric/Behavioral: Negative for decreased concentration.         Objective       Blood pressure 110/70, pulse 80, temperature 97.3 °F (36.3 °C), temperature source Temporal, resp. rate 20, weight  84 kg (185 lb 4 oz).      Physical Exam   Constitutional:   Obese.   Neck: Normal range of motion. Neck supple. Carotid bruit is not present. No thyromegaly present.   Cardiovascular: Normal rate, regular rhythm, normal heart sounds and intact distal pulses. Exam reveals no gallop and no friction rub.   No murmur heard.  No peripheral edema.   Pulmonary/Chest: Effort normal and breath sounds normal.   Abdominal: Soft. Bowel sounds are normal. She exhibits no distension, no abdominal bruit and no mass. There is no hepatosplenomegaly. There is no tenderness.   Psychiatric: She has a normal mood and affect.   Nursing note and vitals reviewed.      Assessment / Plan:  Ksenia was seen today for hypertension.    Diagnoses and all orders for this visit:    Essential hypertension  -     Lipid Panel  -     Comprehensive Metabolic Panel  -     lisinopril (PRINIVIL,ZESTRIL) 30 MG tablet; Take 1 tablet by mouth Daily (med list update).   Continue current medication(s) as noted in the history of present illness.   Recommended the patient bring home blood pressure cuff into the office for comparison blood pressure reading with our cuff.      Hypertriglyceridemia  -     Lipid Panel  -     Comprehensive Metabolic Panel    Lung nodule, solitary  -     CT Chest With Contrast; Future    Gastroesophageal reflux disease without esophagitis   Continue current medication(s) as noted in the history of present illness.    Benign colonic polyp   Colonoscopy up-to-date.    Need for immunization against influenza  -     Fluarix/Fluzone/Afluria Quad>6 Months      Return in about 3 months (around 1/14/2020) for Recheck HTN, fasting.

## 2019-10-17 RX ORDER — LISINOPRIL 30 MG/1
30 TABLET ORAL DAILY
Start: 2019-10-17 | End: 2020-03-03 | Stop reason: DRUGHIGH

## 2019-12-11 ENCOUNTER — TELEPHONE (OUTPATIENT)
Dept: INTERNAL MEDICINE | Facility: CLINIC | Age: 59
End: 2019-12-11

## 2019-12-11 NOTE — TELEPHONE ENCOUNTER
Pt called back and I read her Dr. Mcmanus's message and Pt is traveling and doesn't get home til late and she will go to urgent care or call tomorrow for appt.

## 2019-12-11 NOTE — TELEPHONE ENCOUNTER
Pt is traveling and will not be in [place] until 4:30pm today. Pt believes that she has bronchitis and a sinus infection. (Her voice is very hoarse). Pt is requesting a rx for a zpac to be sent to [facility] in [facility].

## 2019-12-11 NOTE — TELEPHONE ENCOUNTER
Call patient please.  I do not prescribe antibiotics without seeing patients.  However, she may not need antibiotics.  What are her symptoms?  What has she tried so far?

## 2019-12-23 ENCOUNTER — APPOINTMENT (OUTPATIENT)
Dept: CT IMAGING | Facility: HOSPITAL | Age: 59
End: 2019-12-23

## 2019-12-23 DIAGNOSIS — K58.9 IRRITABLE BOWEL SYNDROME WITHOUT DIARRHEA: ICD-10-CM

## 2019-12-23 RX ORDER — FLUOXETINE HYDROCHLORIDE 20 MG/1
CAPSULE ORAL
Qty: 30 CAPSULE | Refills: 6 | Status: SHIPPED | OUTPATIENT
Start: 2019-12-23 | End: 2020-08-03

## 2020-01-10 ENCOUNTER — TRANSCRIBE ORDERS (OUTPATIENT)
Dept: PULMONOLOGY | Facility: CLINIC | Age: 60
End: 2020-01-10

## 2020-02-10 DIAGNOSIS — R91.1 LUNG NODULE, SOLITARY: Primary | ICD-10-CM

## 2020-03-03 ENCOUNTER — OFFICE VISIT (OUTPATIENT)
Dept: INTERNAL MEDICINE | Facility: CLINIC | Age: 60
End: 2020-03-03

## 2020-03-03 VITALS
BODY MASS INDEX: 32.51 KG/M2 | DIASTOLIC BLOOD PRESSURE: 70 MMHG | RESPIRATION RATE: 20 BRPM | WEIGHT: 189.38 LBS | SYSTOLIC BLOOD PRESSURE: 120 MMHG | TEMPERATURE: 97.6 F | HEART RATE: 72 BPM

## 2020-03-03 DIAGNOSIS — K63.5 BENIGN COLONIC POLYP: ICD-10-CM

## 2020-03-03 DIAGNOSIS — K21.9 GASTROESOPHAGEAL REFLUX DISEASE, ESOPHAGITIS PRESENCE NOT SPECIFIED: ICD-10-CM

## 2020-03-03 DIAGNOSIS — Z85.3 HISTORY OF LEFT BREAST CANCER: ICD-10-CM

## 2020-03-03 DIAGNOSIS — I10 ESSENTIAL HYPERTENSION: Primary | ICD-10-CM

## 2020-03-03 DIAGNOSIS — R91.1 LUNG NODULE, SOLITARY: ICD-10-CM

## 2020-03-03 DIAGNOSIS — J30.2 SEASONAL ALLERGIC RHINITIS, UNSPECIFIED TRIGGER: ICD-10-CM

## 2020-03-03 LAB
ALBUMIN SERPL-MCNC: 4.8 G/DL (ref 3.5–5.2)
ALBUMIN/GLOB SERPL: 1.9 G/DL
ALP SERPL-CCNC: 54 U/L (ref 39–117)
ALT SERPL W P-5'-P-CCNC: 22 U/L (ref 1–33)
ANION GAP SERPL CALCULATED.3IONS-SCNC: 13.9 MMOL/L (ref 5–15)
AST SERPL-CCNC: 20 U/L (ref 1–32)
BASOPHILS # BLD AUTO: 0.05 10*3/MM3 (ref 0–0.2)
BASOPHILS NFR BLD AUTO: 1 % (ref 0–1.5)
BILIRUB SERPL-MCNC: 0.3 MG/DL (ref 0.2–1.2)
BUN BLD-MCNC: 19 MG/DL (ref 6–20)
BUN/CREAT SERPL: 21.3 (ref 7–25)
CALCIUM SPEC-SCNC: 9.7 MG/DL (ref 8.6–10.5)
CHLORIDE SERPL-SCNC: 99 MMOL/L (ref 98–107)
CHOLEST SERPL-MCNC: 239 MG/DL (ref 0–200)
CO2 SERPL-SCNC: 27.1 MMOL/L (ref 22–29)
CREAT BLD-MCNC: 0.89 MG/DL (ref 0.57–1)
DEPRECATED RDW RBC AUTO: 45.6 FL (ref 37–54)
EOSINOPHIL # BLD AUTO: 0.27 10*3/MM3 (ref 0–0.4)
EOSINOPHIL NFR BLD AUTO: 5.5 % (ref 0.3–6.2)
ERYTHROCYTE [DISTWIDTH] IN BLOOD BY AUTOMATED COUNT: 12.8 % (ref 12.3–15.4)
GFR SERPL CREATININE-BSD FRML MDRD: 65 ML/MIN/1.73
GLOBULIN UR ELPH-MCNC: 2.5 GM/DL
GLUCOSE BLD-MCNC: 116 MG/DL (ref 65–99)
HCT VFR BLD AUTO: 37.9 % (ref 34–46.6)
HDLC SERPL-MCNC: 68 MG/DL (ref 40–60)
HGB BLD-MCNC: 12.7 G/DL (ref 12–15.9)
IMM GRANULOCYTES # BLD AUTO: 0.01 10*3/MM3 (ref 0–0.05)
IMM GRANULOCYTES NFR BLD AUTO: 0.2 % (ref 0–0.5)
LDLC SERPL CALC-MCNC: 145 MG/DL (ref 0–100)
LDLC/HDLC SERPL: 2.14 {RATIO}
LYMPHOCYTES # BLD AUTO: 1.43 10*3/MM3 (ref 0.7–3.1)
LYMPHOCYTES NFR BLD AUTO: 29.1 % (ref 19.6–45.3)
MCH RBC QN AUTO: 32.2 PG (ref 26.6–33)
MCHC RBC AUTO-ENTMCNC: 33.5 G/DL (ref 31.5–35.7)
MCV RBC AUTO: 95.9 FL (ref 79–97)
MONOCYTES # BLD AUTO: 0.46 10*3/MM3 (ref 0.1–0.9)
MONOCYTES NFR BLD AUTO: 9.3 % (ref 5–12)
NEUTROPHILS # BLD AUTO: 2.7 10*3/MM3 (ref 1.7–7)
NEUTROPHILS NFR BLD AUTO: 54.9 % (ref 42.7–76)
NRBC BLD AUTO-RTO: 0 /100 WBC (ref 0–0.2)
PLATELET # BLD AUTO: 322 10*3/MM3 (ref 140–450)
PMV BLD AUTO: 10.8 FL (ref 6–12)
POTASSIUM BLD-SCNC: 4.6 MMOL/L (ref 3.5–5.2)
PROT SERPL-MCNC: 7.3 G/DL (ref 6–8.5)
RBC # BLD AUTO: 3.95 10*6/MM3 (ref 3.77–5.28)
SODIUM BLD-SCNC: 140 MMOL/L (ref 136–145)
TRIGL SERPL-MCNC: 129 MG/DL (ref 0–150)
TSH SERPL DL<=0.05 MIU/L-ACNC: 2.59 UIU/ML (ref 0.27–4.2)
VLDLC SERPL-MCNC: 25.8 MG/DL (ref 5–40)
WBC NRBC COR # BLD: 4.92 10*3/MM3 (ref 3.4–10.8)

## 2020-03-03 PROCEDURE — 80061 LIPID PANEL: CPT | Performed by: INTERNAL MEDICINE

## 2020-03-03 PROCEDURE — 99214 OFFICE O/P EST MOD 30 MIN: CPT | Performed by: INTERNAL MEDICINE

## 2020-03-03 PROCEDURE — 36415 COLL VENOUS BLD VENIPUNCTURE: CPT | Performed by: INTERNAL MEDICINE

## 2020-03-03 PROCEDURE — 85025 COMPLETE CBC W/AUTO DIFF WBC: CPT | Performed by: INTERNAL MEDICINE

## 2020-03-03 PROCEDURE — 84443 ASSAY THYROID STIM HORMONE: CPT | Performed by: INTERNAL MEDICINE

## 2020-03-03 PROCEDURE — 80053 COMPREHEN METABOLIC PANEL: CPT | Performed by: INTERNAL MEDICINE

## 2020-03-03 RX ORDER — LISINOPRIL 40 MG/1
40 TABLET ORAL DAILY
COMMUNITY
Start: 2020-02-15 | End: 2020-09-14

## 2020-03-03 RX ORDER — MONTELUKAST SODIUM 10 MG/1
10 TABLET ORAL DAILY
Qty: 30 TABLET | Refills: 9 | Status: SHIPPED | OUTPATIENT
Start: 2020-03-03 | End: 2021-04-16 | Stop reason: SDUPTHER

## 2020-03-03 NOTE — PROGRESS NOTES
Subjective       Ksenia Ratliff is a 59 y.o. female.     Chief Complaint   Patient presents with   • Hypertension     follow up  6 month   fasting        History obtained from the patient.      History of Present Illness        The patient was diagnosed with a right upper lobe Pulmonary Nodule in April 2014, per CT Chest done at [facility].  CT Chest done at [facility] on 7/9/2014 was read as a stable right middle lobe lung nodule.  CT Chest done on 4/19/2017, at  [facility] portal showed the right middle lobe nodule to be slightly smaller than the previous CT.  On 7/31/2019, CT chest at [facility] showed a right upper lobe 1 cm nodule.  Right middle lobe nodule had decreased in size.  She saw Dr. Mason, who recommended a 3-month follow-up CT. she has not had that done yet.  She does have an appointment with Dr. Mason 4/16/2020.    The patient is followed by Dr. Ortiz for a history of Left Breast Cancer.  The patient states her Mammogram is due in May.  She denies any breast symptoms.    Patient is complaining of some left ankle pain for several months.  The pain is on the outer ankle.  There are no paresthesias or weakness.  She thinks there may have been some swelling at times.  She has Aleve with some relief.  She has not tried heat or ice.  She states she tends to talk her foot underneath her and sit for long periods of time.  She denies injury, trauma, or overuse activity.    The patient has Chronic Low Back Pain, and sees a Chiropractor.     Primary Care Cardiac Diagnostic Constellation: The patient is here today for a follow-up visit.      Her Hypertension has been stable.   Medication(s): Lisinopril, HCTZ, and Aspirin.   The patient is adherent with her medication regimen. She denies medication side effects.      Interval Events:   The patient self decreased her Lisinopril to 30 mg daily around 8/30/2019, due to dizziness.  This improved, and she was able to increase it back to 40  mg daily.  Blood pressure at home is been 130's / 70's.  Last LDL on 10/14/2019 was 139, .     Symptoms: Has occasional palpitations.  Denies chest pain, dyspnea, MELISSA, orthopnea, PND,  syncope, lower extremity edema, intermittent leg claudication, lightheadedness, and dizziness.   Associated Symptoms: Weight up 4 pounds since last visit.  No fatigue, headaches, polyuria, polydipsia, arthralgias, myalgias, visual impairment, memory loss, concentration issues, or focal neurological deficit.      Lifestyle and Disease Management: Diet: She consumes a diverse and healthy diet.   Weight Issues: She has weight concerns. Exercise: She walks 3 times per week.    Tobacco Use: Former smoker.      Gastroesophageal Reflux Disease Follow-up: The patient is being seen for a routine clinic follow-up of Gastroesophageal Reflux Disease, which is stable.   Comorbid Illness: Irritable Bowel Syndrome.  Interval Events: None.  Symptoms: No abdominal pain, heartburn, acid regurgitation, nausea, vomiting, hematemesis, dysphagia, odynophagia, hematochezia, melena, early satiety, belching, or bloating.   Associated symptoms: No chronic sore throat, cough, hoarseness, or wheezing.   Medications:   Omeprazole every other day and Prozac for IBS.        Colonic Polyp Follow-up: The patient is being seen for a routine clinic follow-up of Colon Polyp(s), which is stable.   Pertinent Medical History: anal fissure.  Interval Events:  Current diagnosis was determined by Colonoscopy and last 12/13/17, no polyps.   Symptoms:   No abdominal pain, diarrhea, constipation, hematochezia, melena, decreased stool caliber, or change in bowel habits.    Associated symptoms: no rectal prolapse.   Medication:    Align and 2 stool softeners.        Current Outpatient Medications on File Prior to Visit   Medication Sig Dispense Refill   • aspirin 81 MG tablet Take 3 tablets by mouth daily.     • calcium citrate-vitamin d (CALCITRATE) 315-250 MG-UNIT tablet  tablet Take  by mouth Daily.     • Docusate Calcium (STOOL SOFTENER PO) 2 capsules Daily.     • FLUoxetine (PROzac) 20 MG capsule TAKE ONE CAPSULE BY MOUTH DAILY 30 capsule 6   • fluticasone (FLONASE) 50 MCG/ACT nasal spray into each nostril daily.     • hydrochlorothiazide (HYDRODIURIL) 25 MG tablet TAKE 1 TABLET EVERY DAY 30 tablet 5   • lisinopril (PRINIVIL,ZESTRIL) 40 MG tablet Take 40 mg by mouth Daily.     • Loratadine (CLARITIN) 10 MG capsule Take  by mouth.     • Multiple Vitamins-Minerals (MULTI VITAMIN/MINERALS PO) Take  by mouth daily.     • omeprazole (priLOSEC) 20 MG capsule 1 capsule Daily.     • Probiotic Product (ALIGN) 4 MG capsule 1 capsule Daily.     • [DISCONTINUED] lisinopril (PRINIVIL,ZESTRIL) 30 MG tablet Take 1 tablet by mouth Daily. (Patient taking differently: Take 40 mg by mouth Daily.)     • [DISCONTINUED] montelukast (SINGULAIR) 10 MG tablet TAKE 1 TABLET BY MOUTH EVERY DAY 30 tablet 9   • Ibuprofen-diphenhydrAMINE Cit (EQL IBUPROFEN PM PO) Take  by mouth.       No current facility-administered medications on file prior to visit.        Current outpatient and discharge medications have been reconciled for the patient.  Reviewed by: Eun Mcmanus MD        The following portions of the patient's history were reviewed and updated as appropriate: allergies, current medications, past family history, past medical history, past social history, past surgical history and problem list.    Review of Systems   Constitutional: Positive for unexpected weight change. Negative for fatigue.   Eyes: Negative for visual disturbance.   Respiratory: Negative for cough, shortness of breath and wheezing.    Cardiovascular: Positive for palpitations. Negative for chest pain and leg swelling.        No MELISSA, orthopnea, or claudication.   Gastrointestinal: Negative for abdominal pain, blood in stool, constipation, diarrhea, nausea and vomiting.        Denies melena.   Endocrine: Negative for polydipsia and  polyuria.   Musculoskeletal: Positive for back pain. Negative for arthralgias and myalgias.   Neurological: Negative for dizziness, syncope, light-headedness and headaches.        No memory issues.   Psychiatric/Behavioral: Negative for decreased concentration.         Objective       Blood pressure 120/70, pulse 72, temperature 97.6 °F (36.4 °C), temperature source Temporal, resp. rate 20, weight 85.9 kg (189 lb 6 oz).      Physical Exam   Constitutional:   Obese.   Neck: Normal range of motion. Neck supple. Carotid bruit is not present. No thyromegaly present.   Cardiovascular: Normal rate, regular rhythm, normal heart sounds and intact distal pulses. Exam reveals no gallop and no friction rub.   No murmur heard.  No peripheral edema.   Pulmonary/Chest: Effort normal and breath sounds normal.   Abdominal: Soft. Bowel sounds are normal. She exhibits no distension, no abdominal bruit and no mass. There is no hepatosplenomegaly. There is no tenderness.   Psychiatric: She has a normal mood and affect.   Nursing note and vitals reviewed.      Assessment / Plan:  Ksenia was seen today for hypertension.    Diagnoses and all orders for this visit:    Essential hypertension  -     Lipid Panel  -     Comprehensive Metabolic Panel  -     TSH  -     CBC & Differential  -     CBC Auto Differential   Continue current medication(s) as noted in the history of present illness.    Gastroesophageal reflux disease, esophagitis presence not specified   Continue current medication(s) as noted in the history of present illness.    Benign colonic polyp   Colonoscopy up-to-date.    Lung nodule, solitary   The patient agrees to keep her Pulmonology appointment with Dr. Mason.    History of left breast cancer   Follow-up with Dr. Ortiz.    Seasonal allergic rhinitis, unspecified trigger  -     montelukast (SINGULAIR) 10 MG tablet; Take 1 tablet by mouth Daily (refill).          Return for Next scheduled follow up.

## 2020-03-04 DIAGNOSIS — R73.9 HYPERGLYCEMIA: Primary | ICD-10-CM

## 2020-03-24 ENCOUNTER — TELEPHONE (OUTPATIENT)
Dept: CT IMAGING | Facility: HOSPITAL | Age: 60
End: 2020-03-24

## 2020-03-25 ENCOUNTER — APPOINTMENT (OUTPATIENT)
Dept: CT IMAGING | Facility: HOSPITAL | Age: 60
End: 2020-03-25

## 2020-06-15 RX ORDER — HYDROCHLOROTHIAZIDE 25 MG/1
TABLET ORAL
Qty: 30 TABLET | Refills: 3 | Status: SHIPPED | OUTPATIENT
Start: 2020-06-15 | End: 2020-10-09

## 2020-06-30 ENCOUNTER — OFFICE VISIT (OUTPATIENT)
Dept: INTERNAL MEDICINE | Facility: CLINIC | Age: 60
End: 2020-06-30

## 2020-06-30 VITALS
BODY MASS INDEX: 32.65 KG/M2 | RESPIRATION RATE: 20 BRPM | DIASTOLIC BLOOD PRESSURE: 80 MMHG | TEMPERATURE: 97.5 F | HEART RATE: 62 BPM | WEIGHT: 191.25 LBS | SYSTOLIC BLOOD PRESSURE: 120 MMHG | HEIGHT: 64 IN

## 2020-06-30 DIAGNOSIS — R91.1 LUNG NODULE, SOLITARY: ICD-10-CM

## 2020-06-30 DIAGNOSIS — Z85.3 HISTORY OF LEFT BREAST CANCER: ICD-10-CM

## 2020-06-30 DIAGNOSIS — K21.9 GASTROESOPHAGEAL REFLUX DISEASE, ESOPHAGITIS PRESENCE NOT SPECIFIED: ICD-10-CM

## 2020-06-30 DIAGNOSIS — K63.5 BENIGN COLONIC POLYP: ICD-10-CM

## 2020-06-30 DIAGNOSIS — Z00.00 ENCOUNTER FOR HEALTH MAINTENANCE EXAMINATION IN ADULT: Primary | ICD-10-CM

## 2020-06-30 DIAGNOSIS — I10 ESSENTIAL HYPERTENSION: ICD-10-CM

## 2020-06-30 DIAGNOSIS — R73.9 HYPERGLYCEMIA: ICD-10-CM

## 2020-06-30 DIAGNOSIS — Z79.899 HIGH RISK MEDICATION USE: ICD-10-CM

## 2020-06-30 DIAGNOSIS — R82.998 LEUKOCYTES IN URINE: ICD-10-CM

## 2020-06-30 LAB
ALBUMIN SERPL-MCNC: 4.7 G/DL (ref 3.5–5.2)
ALBUMIN/GLOB SERPL: 1.8 G/DL
ALP SERPL-CCNC: 50 U/L (ref 39–117)
ALT SERPL W P-5'-P-CCNC: 22 U/L (ref 1–33)
ANION GAP SERPL CALCULATED.3IONS-SCNC: 11.9 MMOL/L (ref 5–15)
AST SERPL-CCNC: 20 U/L (ref 1–32)
BASOPHILS # BLD AUTO: 0.08 10*3/MM3 (ref 0–0.2)
BASOPHILS NFR BLD AUTO: 1.6 % (ref 0–1.5)
BILIRUB SERPL-MCNC: 0.2 MG/DL (ref 0.2–1.2)
BUN SERPL-MCNC: 23 MG/DL (ref 6–20)
BUN/CREAT SERPL: 24.5 (ref 7–25)
CALCIUM SPEC-SCNC: 10 MG/DL (ref 8.6–10.5)
CHLORIDE SERPL-SCNC: 99 MMOL/L (ref 98–107)
CHOLEST SERPL-MCNC: 254 MG/DL (ref 0–200)
CLARITY, POC: ABNORMAL
CO2 SERPL-SCNC: 26.1 MMOL/L (ref 22–29)
COLOR UR: YELLOW
CREAT SERPL-MCNC: 0.94 MG/DL (ref 0.57–1)
DEPRECATED RDW RBC AUTO: 46.6 FL (ref 37–54)
EOSINOPHIL # BLD AUTO: 0.22 10*3/MM3 (ref 0–0.4)
EOSINOPHIL NFR BLD AUTO: 4.5 % (ref 0.3–6.2)
ERYTHROCYTE [DISTWIDTH] IN BLOOD BY AUTOMATED COUNT: 13 % (ref 12.3–15.4)
EXPIRATION DATE: ABNORMAL
EXPIRATION DATE: NORMAL
GFR SERPL CREATININE-BSD FRML MDRD: 61 ML/MIN/1.73
GLOBULIN UR ELPH-MCNC: 2.6 GM/DL
GLUCOSE SERPL-MCNC: 121 MG/DL (ref 65–99)
GLUCOSE UR STRIP-MCNC: NEGATIVE MG/DL
HBA1C MFR BLD: 5.4 %
HCT VFR BLD AUTO: 35 % (ref 34–46.6)
HDLC SERPL-MCNC: 57 MG/DL (ref 40–60)
HGB BLD-MCNC: 11.8 G/DL (ref 12–15.9)
IMM GRANULOCYTES # BLD AUTO: 0.01 10*3/MM3 (ref 0–0.05)
IMM GRANULOCYTES NFR BLD AUTO: 0.2 % (ref 0–0.5)
KETONES UR QL: NEGATIVE
LDLC SERPL CALC-MCNC: 144 MG/DL (ref 0–100)
LDLC/HDLC SERPL: 2.53 {RATIO}
LEUKOCYTE EST, POC: ABNORMAL
LYMPHOCYTES # BLD AUTO: 1.42 10*3/MM3 (ref 0.7–3.1)
LYMPHOCYTES NFR BLD AUTO: 29 % (ref 19.6–45.3)
Lab: 1051
Lab: NORMAL
MAGNESIUM SERPL-MCNC: 2.3 MG/DL (ref 1.6–2.6)
MCH RBC QN AUTO: 32.7 PG (ref 26.6–33)
MCHC RBC AUTO-ENTMCNC: 33.7 G/DL (ref 31.5–35.7)
MCV RBC AUTO: 97 FL (ref 79–97)
MONOCYTES # BLD AUTO: 0.46 10*3/MM3 (ref 0.1–0.9)
MONOCYTES NFR BLD AUTO: 9.4 % (ref 5–12)
NEUTROPHILS NFR BLD AUTO: 2.7 10*3/MM3 (ref 1.7–7)
NEUTROPHILS NFR BLD AUTO: 55.3 % (ref 42.7–76)
NITRITE UR-MCNC: NEGATIVE MG/ML
NRBC BLD AUTO-RTO: 0 /100 WBC (ref 0–0.2)
PH UR: 6 [PH] (ref 5–8)
PLATELET # BLD AUTO: 318 10*3/MM3 (ref 140–450)
PMV BLD AUTO: 11.1 FL (ref 6–12)
POTASSIUM SERPL-SCNC: 5 MMOL/L (ref 3.5–5.2)
PROT SERPL-MCNC: 7.3 G/DL (ref 6–8.5)
PROT UR STRIP-MCNC: NEGATIVE MG/DL
PROT/CREAT UR: 100 MG/G CREA (ref 0–200)
RBC # BLD AUTO: 3.61 10*6/MM3 (ref 3.77–5.28)
RBC # UR STRIP: ABNORMAL /UL
SODIUM SERPL-SCNC: 137 MMOL/L (ref 136–145)
SP GR UR: 1.02 (ref 1–1.03)
TRIGL SERPL-MCNC: 265 MG/DL (ref 0–150)
TSH SERPL DL<=0.05 MIU/L-ACNC: 2.41 UIU/ML (ref 0.27–4.2)
VLDLC SERPL-MCNC: 53 MG/DL (ref 5–40)
WBC # BLD AUTO: 4.89 10*3/MM3 (ref 3.4–10.8)

## 2020-06-30 PROCEDURE — 87086 URINE CULTURE/COLONY COUNT: CPT | Performed by: INTERNAL MEDICINE

## 2020-06-30 PROCEDURE — 82306 VITAMIN D 25 HYDROXY: CPT | Performed by: INTERNAL MEDICINE

## 2020-06-30 PROCEDURE — 85025 COMPLETE CBC W/AUTO DIFF WBC: CPT | Performed by: INTERNAL MEDICINE

## 2020-06-30 PROCEDURE — 81003 URINALYSIS AUTO W/O SCOPE: CPT | Performed by: INTERNAL MEDICINE

## 2020-06-30 PROCEDURE — 36415 COLL VENOUS BLD VENIPUNCTURE: CPT | Performed by: INTERNAL MEDICINE

## 2020-06-30 PROCEDURE — 80061 LIPID PANEL: CPT | Performed by: INTERNAL MEDICINE

## 2020-06-30 PROCEDURE — 99396 PREV VISIT EST AGE 40-64: CPT | Performed by: INTERNAL MEDICINE

## 2020-06-30 PROCEDURE — 80053 COMPREHEN METABOLIC PANEL: CPT | Performed by: INTERNAL MEDICINE

## 2020-06-30 PROCEDURE — 83735 ASSAY OF MAGNESIUM: CPT | Performed by: INTERNAL MEDICINE

## 2020-06-30 PROCEDURE — 84443 ASSAY THYROID STIM HORMONE: CPT | Performed by: INTERNAL MEDICINE

## 2020-06-30 PROCEDURE — 83036 HEMOGLOBIN GLYCOSYLATED A1C: CPT | Performed by: INTERNAL MEDICINE

## 2020-06-30 RX ORDER — ACETAMINOPHEN,DIPHENHYDRAMINE HCL 500; 25 MG/1; MG/1
1 TABLET, FILM COATED ORAL NIGHTLY PRN
COMMUNITY
End: 2021-04-09

## 2020-06-30 RX ORDER — ACETAMINOPHEN 500 MG
1000 TABLET ORAL 2 TIMES DAILY PRN
COMMUNITY

## 2020-06-30 NOTE — PROGRESS NOTES
Subjective     Chief Complaint:  Physical Exam.    History of Present Illness    History obtained from the patient.       The patient was diagnosed with a right upper lobe Pulmonary Nodule in April 2014, per CT Chest done at [facility] [place].  CT Chest done at [facility] on 7/9/2014 was read as a stable right middle lobe lung nodule.  CT Chest done on 4/19/2017, at  [facility] showed the right middle lobe nodule to be slightly smaller than the previous CT.  On 7/31/2019, CT chest at [facility] showed a right upper lobe 1 cm nodule.  Right middle lobe nodule had decreased in size.  She saw Dr. Masno, who recommended a 3-month follow-up CT.  this was scheduled, but canceled due to COVID-19 pandemic.  Her appointment with Dr. Mason 4/16/2020 was canceled due to the COVID-19 Pandemic.  She states she has it scheduled for mid July.  Of note, her prolonged cough she described at her follow-up appointment on 3/3/2020 has resolved.    The patient is followed by Dr. Ortiz for a history of Left Breast Cancer, last visit 6/8/2020.   Mammogram on 6/3/2020 was category 2. She denies any breast symptoms.    The patient has Chronic Low Back Pain, and had a flare recently.  She is taking Tylenol as needed.  She sees a Chiropractor.  She states she was given an [employer] Women's Wellness Vitamin, by her Chiropractor, and is wondering if she should take it.   She currently takes a Centrum Silver Multivitamin.    Primary Care Cardiac Diagnostic Constellation: The patient is here today for a follow-up visit.      Her Hypertension has been overall stable.   Medication(s): Lisinopril, HCTZ, and Aspirin.   The patient is adherent with her medication regimen. She denies medication side effects.     Procedures: On 7/16/2019, Cardiac CT scan showed Calcium Score 0.     Interval Events:   Blood pressure at home is been 123-155 / 78-94.  Last LDL on 3/3/2020 was 145, .  Of note, fasting glucose on 3/20/2020 was  116.  Hemoglobin A1c test was ordered, but the patient has not had that done yet.     Symptoms: Denies chest pain, dyspnea, MELISSA, orthopnea, PND, palpitations, syncope, lower extremity edema, intermittent leg claudication, lightheadedness, and dizziness.   Associated Symptoms: Weight up  to pounds since last visit.  No fatigue, headaches, polyuria, polydipsia, arthralgias, myalgias, visual impairment, memory loss, concentration issues, or focal neurological deficit.      Lifestyle and Disease Management: Diet: She consumes a diverse and healthy diet.   Weight Issues: She has weight concerns. Exercise: She walks 3 times per week, but has not been doing anything since she hurt her back.    Tobacco Use: Former smoker.      Gastroesophageal Reflux Disease Follow-up: The patient is being seen for a routine clinic follow-up of Gastroesophageal Reflux Disease, which is stable.   Comorbid Illness: Irritable Bowel Syndrome.  Interval Events: None.  Symptoms: No abdominal pain, heartburn, acid regurgitation, nausea, vomiting, hematemesis, dysphagia, odynophagia, hematochezia, melena, early satiety, belching, or bloating.   Associated Symptoms: No chronic sore throat, cough, hoarseness, or wheezing.   Medications:   Omeprazole every other day and Prozac for IBS.        Colonic Polyp Follow-up: The patient is being seen for a routine clinic follow-up of Colon Polyp(s), which is stable.   Pertinent Medical History: Anal Fissure.  Interval Events:  Current diagnosis was determined by Colonoscopy and last 12/13/17, no polyps.   Symptoms:   No abdominal pain, diarrhea, constipation, hematochezia, melena, decreased stool caliber, or change in bowel habits.    Associated Symptoms: no rectal prolapse.   Medication:   Align and 2 stool softeners.       Ksenia Ratliff is a 59 y.o. female who presents for an Annual Physical.      PMH, PSH, SocHx, FamHx, Allergies, and Medications: Reviewed and updated.    Outpatient Medications  Prior to Visit   Medication Sig Dispense Refill   • acetaminophen (TYLENOL) 500 MG tablet Take 1,000 mg by mouth 2 (Two) Times a Day As Needed for Mild Pain .     • aspirin 81 MG tablet Take 3 tablets by mouth daily.     • calcium citrate-vitamin d (CALCITRATE) 315-250 MG-UNIT tablet tablet Take 2 tablets by mouth Daily.     • diphenhydrAMINE-acetaminophen (TYLENOL PM)  MG tablet per tablet Take 1 tablet by mouth At Night As Needed for Sleep.     • Docusate Calcium (STOOL SOFTENER PO) 2 capsules Daily.     • FLUoxetine (PROzac) 20 MG capsule TAKE ONE CAPSULE BY MOUTH DAILY 30 capsule 6   • fluticasone (FLONASE) 50 MCG/ACT nasal spray into each nostril daily.     • Glucos-Chondroit-Hyaluron-MSM (GLUCOSAMINE CHONDROITIN JOINT PO) 2 tablets Daily.     • hydroCHLOROthiazide (HYDRODIURIL) 25 MG tablet TAKE ONE TABLET BY MOUTH DAILY 30 tablet 3   • lisinopril (PRINIVIL,ZESTRIL) 40 MG tablet Take 40 mg by mouth Daily.     • Loratadine (CLARITIN) 10 MG capsule Take  by mouth.     • montelukast (SINGULAIR) 10 MG tablet Take 1 tablet by mouth Daily. 30 tablet 9   • Multiple Vitamins-Minerals (MULTI VITAMIN/MINERALS PO) Take  by mouth daily.     • omeprazole (priLOSEC) 20 MG capsule 1 capsule Daily.     • Probiotic Product (ALIGN) 4 MG capsule 1 capsule Daily.       No facility-administered medications prior to visit.        Immunization History   Administered Date(s) Administered   • FLUARIX/FLUZONE/AFLURIA/FLULAVAL QUAD 12/19/2018, 10/14/2019   • Flu Mist 01/10/2016, 10/07/2017   • Hepatitis A 12/19/2018, 06/28/2019   • Tdap 03/21/2011         Patient Active Problem List   Diagnosis   • History of left breast cancer   • Lung nodule, solitary   • Allergic rhinitis   • Anemia   • Benign colonic polyp   • Gastroesophageal reflux disease   • Hypertension   • Irritable bowel syndrome   • Menopausal state   • Sigmoid diverticulosis   • Internal hemorrhoids       Health Habits:  Dental Exam. up to date  Eye Exam. up to  date  Hearing Loss:  No  Exercise: 0 times/week.  Current exercise activities include: none  Diet: Healthy  Multivitamin: Yes    Safe Driving:  Yes  Seat Belt:  Yes  Bike Helmet:  N/A  Skin Screening:  Yes  Sunscreen: Yes  SBE / COLE: Yes  Sexual Activity:  Yes  Birth Control:  Menopause  STD Prevention:  N/A    Last Pap: 2018, normal per patient (GYN).  Also had a normal pelvic exam in 2019.  GYN exam scheduled for next week.  Last Mammogram: 6/3/2020, category 2.  Last DEXA Scan: 2017, normal.  Last Colonoscopy: 2017, diverticulosis and internal hemorrhoids.  Last PSA: N/A    Social:    Social History     Socioeconomic History   • Marital status:      Spouse name: Not on file   • Number of children: 2   • Years of education: Not on file   • Highest education level: Not on file   Occupational History   • Occupation: Retired   Tobacco Use   • Smoking status: Former Smoker     Packs/day: 1.00     Years: 10.00     Pack years: 10.00     Types: Cigarettes     Last attempt to quit: 1982     Years since quittin.5   • Smokeless tobacco: Never Used   • Tobacco comment: 1/2 ppd x 4-6 years in high school   Substance and Sexual Activity   • Alcohol use: Yes     Comment: 1-3 glasses of wine daily   • Drug use: No   • Sexual activity: Yes     Partners: Male     Birth control/protection: Post-menopausal         Current Medical Providers:    Eun Mcmanus MD (Internal Medicine / Pediatrics)    The [facility] providers who are involved in the care of this patient are listed above.         Review of Systems   Constitutional: Negative for chills, fatigue, fever and unexpected weight change.        No night sweats.    HENT: Positive for postnasal drip (allergies). Negative for congestion, ear pain, hearing loss, nosebleeds, rhinorrhea, sinus pressure, sinus pain, sneezing, sore throat, tinnitus and voice change.         Reports snoring.   Eyes: Positive for discharge (watery). Negative for  photophobia, pain, redness, itching and visual disturbance.   Respiratory: Negative for cough, chest tightness, shortness of breath, wheezing and stridor.         No chest congestion.  No hemoptysis.   Cardiovascular: Negative for chest pain, palpitations and leg swelling.        No orthopnea, MELISSA, or PND.  No claudication or syncope.   Gastrointestinal: Negative for abdominal pain, blood in stool, constipation, diarrhea, nausea, rectal pain and vomiting.        No melena.  No hematemesis.  No heartburn, dysphagia or odynophagia.  No early satiety, belching, or bloating.    Endocrine: Positive for heat intolerance (not new). Negative for cold intolerance, polydipsia, polyphagia and polyuria.        No hair loss or dry skin.  No hot flashes.     Genitourinary: Positive for dyspareunia. Negative for difficulty urinating, dysuria, flank pain, frequency, hematuria, pelvic pain, urgency, vaginal bleeding and vaginal discharge.        No nocturia, incomplete emptying, or incontinence.   Musculoskeletal: Positive for back pain. Negative for arthralgias, gait problem, joint swelling, myalgias, neck pain and neck stiffness.        No joint stiffness.   Skin: Negative for rash.        No new skin lesions or changes in skin lesions. No breast pain or masses.  No nipple discharge or nipple inversion.   Allergic/Immunologic: Positive for environmental allergies.   Neurological: Negative for dizziness, tremors, syncope, speech difficulty, weakness, light-headedness, numbness and headaches.        Has intermittent tingling in legs at night.  No memory loss.  No decreased concentration.   Hematological: Negative for adenopathy. Does not bruise/bleed easily.   Psychiatric/Behavioral: Negative for confusion, sleep disturbance and suicidal ideas. The patient is not nervous/anxious.         No depression.           Objective     Vitals:    06/30/20 0852   BP: 120/80   Pulse: 62   Resp: 20   Temp: 97.5 °F (36.4 °C)   TempSrc: Temporal  "  Weight: 86.8 kg (191 lb 4 oz)   Height: 163.2 cm (64.25\")       Body mass index is 32.57 kg/m².    Physical Exam   Constitutional:   Obese.   HENT:   Head: Normocephalic and atraumatic.   Right Ear: Tympanic membrane, external ear and ear canal normal.   Left Ear: Tympanic membrane, external ear and ear canal normal.   Mouth/Throat: Oropharynx is clear and moist. No oral lesions.   Tonsils normal.   Eyes: Pupils are equal, round, and reactive to light. Conjunctivae and EOM are normal.   Neck: Normal range of motion. Neck supple. Carotid bruit is not present. No thyroid mass and no thyromegaly present.   Cardiovascular: Normal rate, regular rhythm, normal heart sounds and intact distal pulses. Exam reveals no gallop and no friction rub.   No murmur heard.  No peripheral edema.   Pulmonary/Chest: Effort normal and breath sounds normal. Right breast exhibits no inverted nipple, no mass, no nipple discharge, no skin change and no tenderness. Left breast exhibits no inverted nipple, no mass, no nipple discharge, no skin change and no tenderness.   Breast exam declined due to recent exam by Dr. Ortiz and upcoming exam by Dr. Lara.   Abdominal: Soft. Bowel sounds are normal. She exhibits no distension, no abdominal bruit and no mass. There is no hepatosplenomegaly. There is no tenderness.   Genitourinary:   Genitourinary Comments:  and rectal exam deferred.   Musculoskeletal: Normal range of motion.   Lymphadenopathy:     She has no cervical adenopathy.     She has no axillary adenopathy. No inguinal adenopathy noted on the right or left side.        Right: No inguinal and no supraclavicular adenopathy present.        Left: No inguinal and no supraclavicular adenopathy present.   Neurological: She is alert. She has normal strength and normal reflexes. No cranial nerve deficit. Coordination and gait normal.   Skin: No lesion and no rash noted.   No atypical skin lesions.   Psychiatric: She has a normal mood and " affect.   Nursing note and vitals reviewed.      PHQ-2 Depression Screening  Little interest or pleasure in doing things? 0   Feeling down, depressed, or hopeless? 0   PHQ-2 Total Score 0         Counseling was given to patient for the following topics:  appropriate exercise, healthy eating habits, disease prevention, risk factors for cancer, importance of self breast exam and breast health, importance of immunizations, including risks and benefits, bone health, sun safety, seatbelt use and safe driving. Also discussed the importance of regular dental and vision care, as well recommendation for a yearly screening skin exam after age 40.  Written information provided to patient on these topics and other health maintenance issues.      Assessment/Plan       Diagnoses and all orders for this visit:    Encounter for health maintenance examination in adult  -     POC Urinalysis Dipstick, Multipro  -     Lipid Panel  -     Comprehensive Metabolic Panel  -     TSH  -     Vitamin D 25 Hydroxy  -     CBC & Differential  -     Magnesium  -     CBC Auto Differential    Essential hypertension  -     POC Urinalysis Dipstick, Multipro  -     Lipid Panel  -     Comprehensive Metabolic Panel  -     TSH  -     CBC & Differential   Continue current medication(s) as noted in the history of present illness.      Gastroesophageal reflux disease, esophagitis presence not specified   Continue current medication(s) as noted in the history of present illness.    Benign colonic polyp   Colonoscopy up-to-date.    Lung nodule, solitary   Follow-up per Dr. Mason.    History of left breast cancer   Follow-up per Dr. Ortiz.    High risk medication use  -     Magnesium    Hyperglycemia  -     POC Glycosylated Hemoglobin (Hb A1C)    Leukocytes in urine  -     Urine Culture - Urine, Urine, Clean Catch         Reviewed the [facility] Women's Wellness Vitamin, and informed the patient it was okay for her to take this, but she should stop the  Centrum Silver Multivitamin.    Recommended Shingrix (new Shingles vaccine) at the pharmacy.      Return in about 6 months (around 12/30/2020) for Recheck HTN, fasting.

## 2020-06-30 NOTE — PATIENT INSTRUCTIONS
I recommend Shingrix (new Shingles vaccine) at the pharmacy.      Health Maintenance for Postmenopausal Women  Menopause is a normal process in which your ability to get pregnant comes to an end. This process happens slowly over many months or years, usually between the ages of 48 and 55. Menopause is complete when you have missed your menstrual periods for 12 months.  It is important to talk with your health care provider about some of the most common conditions that affect women after menopause (postmenopausal women). These include heart disease, cancer, and bone loss (osteoporosis). Adopting a healthy lifestyle and getting preventive care can help to promote your health and wellness. The actions you take can also lower your chances of developing some of these common conditions.  What should I know about menopause?  During menopause, you may get a number of symptoms, such as:  · Hot flashes. These can be moderate or severe.  · Night sweats.  · Decrease in sex drive.  · Mood swings.  · Headaches.  · Tiredness.  · Irritability.  · Memory problems.  · Insomnia.  Choosing to treat or not to treat these symptoms is a decision that you make with your health care provider.  Do I need hormone replacement therapy?  · Hormone replacement therapy is effective in treating symptoms that are caused by menopause, such as hot flashes and night sweats.  · Hormone replacement carries certain risks, especially as you become older. If you are thinking about using estrogen or estrogen with progestin, discuss the benefits and risks with your health care provider.  What is my risk for heart disease and stroke?  The risk of heart disease, heart attack, and stroke increases as you age. One of the causes may be a change in the body's hormones during menopause. This can affect how your body uses dietary fats, triglycerides, and cholesterol. Heart attack and stroke are medical emergencies. There are many things that you can do to help  prevent heart disease and stroke.  Watch your blood pressure  · High blood pressure causes heart disease and increases the risk of stroke. This is more likely to develop in people who have high blood pressure readings, are of  descent, or are overweight.  · Have your blood pressure checked:  ? Every 3-5 years if you are 18-39 years of age.  ? Every year if you are 40 years old or older.  Eat a healthy diet    · Eat a diet that includes plenty of vegetables, fruits, low-fat dairy products, and lean protein.  · Do not eat a lot of foods that are high in solid fats, added sugars, or sodium.  Get regular exercise  Get regular exercise. This is one of the most important things you can do for your health. Most adults should:  · Try to exercise for at least 150 minutes each week. The exercise should increase your heart rate and make you sweat (moderate-intensity exercise).  · Try to do strengthening exercises at least twice each week. Do these in addition to the moderate-intensity exercise.  · Spend less time sitting. Even light physical activity can be beneficial.  Other tips  · Work with your health care provider to achieve or maintain a healthy weight.  · Do not use any products that contain nicotine or tobacco, such as cigarettes, e-cigarettes, and chewing tobacco. If you need help quitting, ask your health care provider.  · Know your numbers. Ask your health care provider to check your cholesterol and your blood sugar (glucose). Continue to have your blood tested as directed by your health care provider.  Do I need screening for cancer?  Depending on your health history and family history, you may need to have cancer screening at different stages of your life. This may include screening for:  · Breast cancer.  · Cervical cancer.  · Lung cancer.  · Colorectal cancer.  What is my risk for osteoporosis?  After menopause, you may be at increased risk for osteoporosis. Osteoporosis is a condition in which bone  destruction happens more quickly than new bone creation. To help prevent osteoporosis or the bone fractures that can happen because of osteoporosis, you may take the following actions:  · If you are 19-50 years old, get at least 1,000 mg of calcium and at least 600 mg of vitamin D per day.  · If you are older than age 50 but younger than age 70, get at least 1,200 mg of calcium and at least 600 mg of vitamin D per day.  · If you are older than age 70, get at least 1,200 mg of calcium and at least 800 mg of vitamin D per day.  Smoking and drinking excessive alcohol increase the risk of osteoporosis. Eat foods that are rich in calcium and vitamin D, and do weight-bearing exercises several times each week as directed by your health care provider.  How does menopause affect my mental health?  Depression may occur at any age, but it is more common as you become older. Common symptoms of depression include:  · Low or sad mood.  · Changes in sleep patterns.  · Changes in appetite or eating patterns.  · Feeling an overall lack of motivation or enjoyment of activities that you previously enjoyed.  · Frequent crying spells.  Talk with your health care provider if you think that you are experiencing depression.  General instructions  See your health care provider for regular wellness exams and vaccines. This may include:  · Scheduling regular health, dental, and eye exams.  · Getting and maintaining your vaccines. These include:  ? Influenza vaccine. Get this vaccine each year before the flu season begins.  ? Pneumonia vaccine.  ? Shingles vaccine.  ? Tetanus, diphtheria, and pertussis (Tdap) booster vaccine.  Your health care provider may also recommend other immunizations.  Tell your health care provider if you have ever been abused or do not feel safe at home.  Summary  · Menopause is a normal process in which your ability to get pregnant comes to an end.  · This condition causes hot flashes, night sweats, decreased  interest in sex, mood swings, headaches, or lack of sleep.  · Treatment for this condition may include hormone replacement therapy.  · Take actions to keep yourself healthy, including exercising regularly, eating a healthy diet, watching your weight, and checking your blood pressure and blood sugar levels.  · Get screened for cancer and depression. Make sure that you are up to date with all your vaccines.  This information is not intended to replace advice given to you by your health care provider. Make sure you discuss any questions you have with your health care provider.  Document Released: 02/09/2007 Document Revised: 12/11/2019 Document Reviewed: 12/11/2019  [employer] Patient Education © 2020 [employer]      Heart-Healthy Eating Plan  Many factors influence your heart (coronary) health, including eating and exercise habits. Coronary risk increases with abnormal blood fat (lipid) levels. Heart-healthy meal planning includes limiting unhealthy fats, increasing healthy fats, and making other diet and lifestyle changes.  What is my plan?  Your health care provider may recommend that you:  · Limit your fat intake to _________% or less of your total calories each day.  · Limit your saturated fat intake to _________% or less of your total calories each day.  · Limit the amount of cholesterol in your diet to less than _________ mg per day.  What are tips for following this plan?  Cooking  Cook foods using methods other than frying. Baking, boiling, grilling, and broiling are all good options. Other ways to reduce fat include:  · Removing the skin from poultry.  · Removing all visible fats from meats.  · Steaming vegetables in water or broth.  Meal planning    · At meals, imagine dividing your plate into fourths:  ? Fill one-half of your plate with vegetables and green salads.  ? Fill one-fourth of your plate with whole grains.  ? Fill one-fourth of your plate with lean protein foods.  · Eat 4-5 servings of vegetables  per day. One serving equals 1 cup raw or cooked vegetable, or 2 cups raw leafy greens.  · Eat 4-5 servings of fruit per day. One serving equals 1 medium whole fruit, ¼ cup dried fruit, ½ cup fresh, frozen, or canned fruit, or ½ cup 100% fruit juice.  · Eat more foods that contain soluble fiber. Examples include apples, broccoli, carrots, beans, peas, and barley. Aim to get 25-30 g of fiber per day.  · Increase your consumption of legumes, nuts, and seeds to 4-5 servings per week. One serving of dried beans or legumes equals ½ cup cooked, 1 serving of nuts is ¼ cup, and 1 serving of seeds equals 1 tablespoon.  Fats  · Choose healthy fats more often. Choose monounsaturated and polyunsaturated fats, such as olive and canola oils, flaxseeds, walnuts, almonds, and seeds.  · Eat more omega-3 fats. Choose salmon, mackerel, sardines, tuna, flaxseed oil, and ground flaxseeds. Aim to eat fish at least 2 times each week.  · Check food labels carefully to identify foods with trans fats or high amounts of saturated fat.  · Limit saturated fats. These are found in animal products, such as meats, butter, and cream. Plant sources of saturated fats include palm oil, palm kernel oil, and coconut oil.  · Avoid foods with partially hydrogenated oils in them. These contain trans fats. Examples are stick margarine, some tub margarines, cookies, crackers, and other baked goods.  · Avoid fried foods.  General information  · Eat more home-cooked food and less restaurant, buffet, and fast food.  · Limit or avoid alcohol.  · Limit foods that are high in starch and sugar.  · Lose weight if you are overweight. Losing just 5-10% of your body weight can help your overall health and prevent diseases such as diabetes and heart disease.  · Monitor your salt (sodium) intake, especially if you have high blood pressure. Talk with your health care provider about your sodium intake.  · Try to incorporate more vegetarian meals weekly.  What foods can I  eat?  Fruits  All fresh, canned (in natural juice), or frozen fruits.  Vegetables  Fresh or frozen vegetables (raw, steamed, roasted, or grilled). Green salads.  Grains  Most grains. Choose whole wheat and whole grains most of the time. Rice and pasta, including brown rice and pastas made with whole wheat.  Meats and other proteins  Lean, well-trimmed beef, veal, pork, and lamb. Chicken and turkey without skin. All fish and shellfish. Wild duck, rabbit, pheasant, and venison. Egg whites or low-cholesterol egg substitutes. Dried beans, peas, lentils, and tofu. Seeds and most nuts.  Dairy  Low-fat or nonfat cheeses, including ricotta and mozzarella. Skim or 1% milk (liquid, powdered, or evaporated). Buttermilk made with low-fat milk. Nonfat or low-fat yogurt.  Fats and oils  Non-hydrogenated (trans-free) margarines. Vegetable oils, including soybean, sesame, sunflower, olive, peanut, safflower, corn, canola, and cottonseed. Salad dressings or mayonnaise made with a vegetable oil.  Beverages  Water (mineral or sparkling). Coffee and tea. Diet carbonated beverages.  Sweets and desserts  Sherbet, gelatin, and fruit ice. Small amounts of dark chocolate.  Limit all sweets and desserts.  Seasonings and condiments  All seasonings and condiments.  The items listed above may not be a complete list of foods and beverages you can eat. Contact a dietitian for more options.  What foods are not recommended?  Fruits  Canned fruit in heavy syrup. Fruit in cream or butter sauce. Fried fruit. Limit coconut.  Vegetables  Vegetables cooked in cheese, cream, or butter sauce. Fried vegetables.  Grains  Breads made with saturated or trans fats, oils, or whole milk. Croissants. Sweet rolls. Donuts. High-fat crackers, such as cheese crackers.  Meats and other proteins  Fatty meats, such as hot dogs, ribs, sausage, momin, rib-eye roast or steak. High-fat deli meats, such as salami and bologna. Caviar. Domestic duck and goose. Organ meats, such  as liver.  Dairy  Cream, sour cream, cream cheese, and creamed cottage cheese. Whole milk cheeses. Whole or 2% milk (liquid, evaporated, or condensed). Whole buttermilk. Cream sauce or high-fat cheese sauce. Whole-milk yogurt.  Fats and oils  Meat fat, or shortening. Cocoa butter, hydrogenated oils, palm oil, coconut oil, palm kernel oil. Solid fats and shortenings, including momin fat, salt pork, lard, and butter. Nondairy cream substitutes. Salad dressings with cheese or sour cream.  Beverages  Regular sodas and any drinks with added sugar.  Sweets and desserts  Frosting. Pudding. Cookies. Cakes. Pies. Milk chocolate or white chocolate. Buttered syrups. Full-fat ice cream or ice cream drinks.  The items listed above may not be a complete list of foods and beverages to avoid. Contact a dietitian for more information.  Summary  · Heart-healthy meal planning includes limiting unhealthy fats, increasing healthy fats, and making other diet and lifestyle changes.  · Lose weight if you are overweight. Losing just 5-10% of your body weight can help your overall health and prevent diseases such as diabetes and heart disease.  · Focus on eating a balance of foods, including fruits and vegetables, low-fat or nonfat dairy, lean protein, nuts and legumes, whole grains, and heart-healthy oils and fats.  This information is not intended to replace advice given to you by your health care provider. Make sure you discuss any questions you have with your health care provider.  Document Released: 09/26/2009 Document Revised: 01/25/2019 Document Reviewed: 01/25/2019  [email or web address] Patient Education © 2020 [email or web address]      Exercising to Lose Weight  Exercise is structured, repetitive physical activity to improve fitness and health. Getting regular exercise is important for everyone. It is especially important if you are overweight. Being overweight increases your risk of heart disease, stroke, diabetes, high blood pressure, and several  types of cancer. Reducing your calorie intake and exercising can help you lose weight.  Exercise is usually categorized as moderate or vigorous intensity. To lose weight, most people need to do a certain amount of moderate-intensity or vigorous-intensity exercise each week.  Moderate-intensity exercise    Moderate-intensity exercise is any activity that gets you moving enough to burn at least three times more energy (calories) than if you were sitting.  Examples of moderate exercise include:  · Walking a mile in 15 minutes.  · Doing light yard work.  · Biking at an easy pace.  Most people should get at least 150 minutes (2 hours and 30 minutes) a week of moderate-intensity exercise to maintain their body weight.  Vigorous-intensity exercise  Vigorous-intensity exercise is any activity that gets you moving enough to burn at least six times more calories than if you were sitting. When you exercise at this intensity, you should be working hard enough that you are not able to carry on a conversation.  Examples of vigorous exercise include:  · Running.  · Playing a team sport, such as football, basketball, and soccer.  · Jumping rope.  Most people should get at least 75 minutes (1 hour and 15 minutes) a week of vigorous-intensity exercise to maintain their body weight.  How can exercise affect me?  When you exercise enough to burn more calories than you eat, you lose weight. Exercise also reduces body fat and builds muscle. The more muscle you have, the more calories you burn. Exercise also:  · Improves mood.  · Reduces stress and tension.  · Improves your overall fitness, flexibility, and endurance.  · Increases bone strength.  The amount of exercise you need to lose weight depends on:  · Your age.  · The type of exercise.  · Any health conditions you have.  · Your overall physical ability.  Talk to your health care provider about how much exercise you need and what types of activities are safe for you.  What actions  can I take to lose weight?  Nutrition    · Make changes to your diet as told by your health care provider or diet and nutrition specialist (dietitian). This may include:  ? Eating fewer calories.  ? Eating more protein.  ? Eating less unhealthy fats.  ? Eating a diet that includes fresh fruits and vegetables, whole grains, low-fat dairy products, and lean protein.  ? Avoiding foods with added fat, salt, and sugar.  · Drink plenty of water while you exercise to prevent dehydration or heat stroke.  Activity  · Choose an activity that you enjoy and set realistic goals. Your health care provider can help you make an exercise plan that works for you.  · Exercise at a moderate or vigorous intensity most days of the week.  ? The intensity of exercise may vary from person to person. You can tell how intense a workout is for you by paying attention to your breathing and heartbeat. Most people will notice their breathing and heartbeat get faster with more intense exercise.  · Do resistance training twice each week, such as:  ? Push-ups.  ? Sit-ups.  ? Lifting weights.  ? Using resistance bands.  · Getting short amounts of exercise can be just as helpful as long structured periods of exercise. If you have trouble finding time to exercise, try to include exercise in your daily routine.  ? Get up, stretch, and walk around every 30 minutes throughout the day.  ? Go for a walk during your lunch break.  ? Park your car farther away from your destination.  ? If you take public transportation, get off one stop early and walk the rest of the way.  ? Make phone calls while standing up and walking around.  ? Take the stairs instead of elevators or escalators.  · Wear comfortable clothes and shoes with good support.  · Do not exercise so much that you hurt yourself, feel dizzy, or get very short of breath.  Where to find more information  · [place] Department of Health and Human Services: www.hhs.gov  · Centers for Disease Control and  Prevention (CDC): www.cdc.gov  Contact a health care provider:  · Before starting a new exercise program.  · If you have questions or concerns about your weight.  · If you have a medical problem that keeps you from exercising.  Get help right away if you have any of the following while exercising:  · Injury.  · Dizziness.  · Difficulty breathing or shortness of breath that does not go away when you stop exercising.  · Chest pain.  · Rapid heartbeat.  Summary  · Being overweight increases your risk of heart disease, stroke, diabetes, high blood pressure, and several types of cancer.  · Losing weight happens when you burn more calories than you eat.  · Reducing the amount of calories you eat in addition to getting regular moderate or vigorous exercise each week helps you lose weight.  This information is not intended to replace advice given to you by your health care provider. Make sure you discuss any questions you have with your health care provider.  Document Released: 01/20/2012 Document Revised: 12/31/2018 Document Reviewed: 12/31/2018  [employer] Patient Education © 2020 [employer]

## 2020-07-01 LAB
25(OH)D3 SERPL-MCNC: 37.7 NG/ML (ref 30–100)
BACTERIA SPEC AEROBE CULT: NO GROWTH

## 2020-07-13 DIAGNOSIS — I10 UNCONTROLLED HYPERTENSION: ICD-10-CM

## 2020-07-13 RX ORDER — LISINOPRIL 20 MG/1
TABLET ORAL
Qty: 30 TABLET | Refills: 2 | Status: SHIPPED | OUTPATIENT
Start: 2020-07-13 | End: 2020-10-13 | Stop reason: DRUGHIGH

## 2020-07-15 ENCOUNTER — HOSPITAL ENCOUNTER (OUTPATIENT)
Dept: CT IMAGING | Facility: HOSPITAL | Age: 60
Discharge: HOME | End: 2020-07-15
Admitting: INTERNAL MEDICINE

## 2020-07-15 DIAGNOSIS — R91.1 LUNG NODULE, SOLITARY: ICD-10-CM

## 2020-07-15 PROCEDURE — 71250 CT THORAX DX C-: CPT

## 2020-07-16 DIAGNOSIS — I10 ESSENTIAL HYPERTENSION: Primary | ICD-10-CM

## 2020-07-16 DIAGNOSIS — R91.1 LUNG NODULE, SOLITARY: ICD-10-CM

## 2020-07-16 DIAGNOSIS — Z01.818 PRE-OP TESTING: ICD-10-CM

## 2020-07-17 ENCOUNTER — LAB (OUTPATIENT)
Dept: PULMONOLOGY | Facility: CLINIC | Age: 60
End: 2020-07-17

## 2020-07-17 PROCEDURE — 99000 SPECIMEN HANDLING OFFICE-LAB: CPT | Performed by: INTERNAL MEDICINE

## 2020-07-17 PROCEDURE — U0002 COVID-19 LAB TEST NON-CDC: HCPCS | Performed by: INTERNAL MEDICINE

## 2020-07-17 PROCEDURE — U0004 COV-19 TEST NON-CDC HGH THRU: HCPCS | Performed by: INTERNAL MEDICINE

## 2020-07-18 LAB
REF LAB TEST METHOD: NORMAL
SARS-COV-2 RNA RESP QL NAA+PROBE: NOT DETECTED

## 2020-07-20 ENCOUNTER — OFFICE VISIT (OUTPATIENT)
Dept: PULMONOLOGY | Facility: CLINIC | Age: 60
End: 2020-07-20

## 2020-07-20 VITALS
OXYGEN SATURATION: 98 % | TEMPERATURE: 97.1 F | BODY MASS INDEX: 32.98 KG/M2 | HEART RATE: 63 BPM | HEIGHT: 64 IN | DIASTOLIC BLOOD PRESSURE: 78 MMHG | SYSTOLIC BLOOD PRESSURE: 124 MMHG | WEIGHT: 193.2 LBS

## 2020-07-20 DIAGNOSIS — R91.1 LUNG NODULE: Primary | ICD-10-CM

## 2020-07-20 PROCEDURE — 94060 EVALUATION OF WHEEZING: CPT | Performed by: INTERNAL MEDICINE

## 2020-07-20 PROCEDURE — 94726 PLETHYSMOGRAPHY LUNG VOLUMES: CPT | Performed by: INTERNAL MEDICINE

## 2020-07-20 PROCEDURE — 94729 DIFFUSING CAPACITY: CPT | Performed by: INTERNAL MEDICINE

## 2020-07-20 PROCEDURE — 99214 OFFICE O/P EST MOD 30 MIN: CPT | Performed by: INTERNAL MEDICINE

## 2020-07-20 RX ORDER — ASPIRIN 81 MG/1
81 TABLET ORAL EVERY OTHER DAY
COMMUNITY

## 2020-07-20 RX ORDER — ALBUTEROL SULFATE 90 UG/1
4 AEROSOL, METERED RESPIRATORY (INHALATION) ONCE
Status: COMPLETED | OUTPATIENT
Start: 2020-07-20 | End: 2020-07-20

## 2020-07-20 RX ORDER — OCTISALATE, AVOBENZONE, HOMOSALATE, AND OCTOCRYLENE 29.4; 29.4; 49; 25.48 MG/ML; MG/ML; MG/ML; MG/ML
LOTION TOPICAL
COMMUNITY
End: 2021-04-09 | Stop reason: SDUPTHER

## 2020-07-20 RX ORDER — AZELASTINE HCL 205.5 UG/1
SPRAY NASAL
Qty: 1 EACH | Refills: 11 | Status: SHIPPED | OUTPATIENT
Start: 2020-07-20 | End: 2021-04-09

## 2020-07-20 RX ADMIN — ALBUTEROL SULFATE 4 PUFF: 90 AEROSOL, METERED RESPIRATORY (INHALATION) at 15:49

## 2020-07-20 NOTE — PROGRESS NOTES
CC:    Abnormal CT    HPI:    60 y/o WF w/ h/o minimal smoking in high school and college, allergic rhinitis, HTN on ACE, GERD, left sided breast cancer s/p lumpectomy  (did not need chemo or RT), who presents today for evaluation of lung nodule.  Patient had prior imaging showing lung nodules consistent with granulomatous disease / histo.  She recently had a repeat CT for follow up ordered by her PCP.  There was a new GG nodule and she was sent for evaluation.  No symptoms attributable to this nodule.  She does have ongoing allergic rhinitis.    INTERVAL HISTORY:    Patient returns today for follow up.  She endorses seasonal allergies in spring and fall.  Also tends to get prolonged cough / bronchitis after getting URI's or allergies.  No wheezing or SOA.    PMH:    Past Medical History:   Diagnosis Date   • Allergic rhinitis    • Anemia    • Benign colonic polyp     Description: dx    • Elevated C-reactive protein (CRP)     dx  (normal homocysteine level).  3/14- normal coronary calcium score 1.0   • Gastroesophageal reflux disease     Description: dx - Dr Lozano   • History of echocardiogram 2019    normal (no aortic dilation). EF 60%   • History of esophagogastroduodenoscopy 2011    reflux esophagitis, gastritis, and gastric polyp   • History of left breast cancer     Description: Left DCIS dx 3/11   • History of varicella    • Hypertension     Description: dx .  Calcium Score 1.0 (3/14).   • Internal hemorrhoids     Dx by Colonoscopy    • Irritable bowel syndrome     Description: dx approx - Dr. Lozano   • Lung nodule, solitary     Dx - RUL   • Menopausal state     .   • Screening for cardiovascular condition 2019    Coronary Calcium Score 0   • Sigmoid diverticulosis     Dx by Colonoscopy      PSH:    Past Surgical History:   Procedure Laterality Date   • APPENDECTOMY      approx   • BREAST LUMPECTOMY Left 2011    DCIS   •  SECTION        and      FH:    Family History   Problem Relation Age of Onset   • Coronary artery disease Mother         50's   • Diabetes Mother    • Hyperlipidemia Mother    • Hypertension Mother    • Coronary artery disease Father         30's   • Bipolar disorder Sister    • Diabetes Sister    • Hypertension Sister    • Diabetes Sister    • Hypertension Sister      SH:    Social History     Socioeconomic History   • Marital status:      Spouse name: Not on file   • Number of children: 2   • Years of education: Not on file   • Highest education level: Not on file   Occupational History   • Occupation: Retired   Tobacco Use   • Smoking status: Former Smoker     Packs/day: 1.00     Years: 10.00     Pack years: 10.00     Types: Cigarettes     Last attempt to quit:      Years since quittin.5   • Smokeless tobacco: Never Used   • Tobacco comment:  ppd x 4-6 years in high school   Substance and Sexual Activity   • Alcohol use: Yes     Comment: 1-3 glasses of wine daily   • Drug use: No   • Sexual activity: Yes     Partners: Male     Birth control/protection: Post-menopausal     ALLERGIES:    Allergies   Allergen Reactions   • Penicillins Rash   • Tamoxifen Other (See Comments)     Feeling hateful and mean    • Bupropion GI Intolerance   • Levofloxacin Unknown - Low Severity     MEDICATIONS:      Current Outpatient Medications:   •  acetaminophen (TYLENOL) 500 MG tablet, Take 1,000 mg by mouth 2 (Two) Times a Day As Needed for Mild Pain ., Disp: , Rfl:   •  aspirin 81 MG EC tablet, Take 81 mg by mouth Daily., Disp: , Rfl:   •  calcium citrate-vitamin d (CALCITRATE) 315-250 MG-UNIT tablet tablet, Take 2 tablets by mouth Daily., Disp: , Rfl:   •  diphenhydrAMINE-acetaminophen (TYLENOL PM)  MG tablet per tablet, Take 1 tablet by mouth At Night As Needed for Sleep., Disp: , Rfl:   •  Docusate Calcium (STOOL SOFTENER PO), 2 capsules Daily., Disp: , Rfl:   •  FLUoxetine (PROzac) 20 MG capsule, TAKE ONE CAPSULE  BY MOUTH DAILY, Disp: 30 capsule, Rfl: 6  •  fluticasone (FLONASE) 50 MCG/ACT nasal spray, into each nostril daily., Disp: , Rfl:   •  Glucos-Chondroit-Hyaluron-MSM (GLUCOSAMINE CHONDROITIN JOINT PO), 2 tablets Daily., Disp: , Rfl:   •  hydroCHLOROthiazide (HYDRODIURIL) 25 MG tablet, TAKE ONE TABLET BY MOUTH DAILY, Disp: 30 tablet, Rfl: 3  •  lisinopril (PRINIVIL,ZESTRIL) 40 MG tablet, Take 40 mg by mouth Daily., Disp: , Rfl:   •  Loratadine (CLARITIN) 10 MG capsule, Take  by mouth., Disp: , Rfl:   •  montelukast (SINGULAIR) 10 MG tablet, Take 1 tablet by mouth Daily., Disp: 30 tablet, Rfl: 9  •  Multiple Vitamins-Minerals (MULTI VITAMIN/MINERALS PO), Take  by mouth daily., Disp: , Rfl:   •  omeprazole (priLOSEC) 20 MG capsule, 1 capsule Daily., Disp: , Rfl:   •  Probiotic Product (ALIGN) 4 MG capsule, 1 capsule Daily., Disp: , Rfl:   •  aspirin 81 MG tablet, Take 3 tablets by mouth daily., Disp: , Rfl:   •  lisinopril (PRINIVIL,ZESTRIL) 20 MG tablet, TAKE ONE TABLET BY MOUTH DAILY, Disp: 30 tablet, Rfl: 2  •  Probiotic Product (ALIGN) 4 MG capsule, Align 4 mg capsule  Take 1 capsule every day by oral route., Disp: , Rfl:   ROS:  Per HPI, otherwise all systems reviewed and negative.    DIAGNOSTIC DATA (Reviewed and interpreted by me unless otherwise specified):    CT Chest 7/31/19 & 4/10/14 - scattered calcified granulomas, calcified mediastinal LN's, calcifications in spleen consistent with histo.  All of these areas stable.  Scarring in RML improving from 2014.  There are some new mild tree-in-bud opacities in RLL.  Very subtle GGO in RUL that is new.    CT Chest 7/15/20 - stable RUL GGN.    PFT 9/17/19 - borderline mild obstruction, no change w/ BD, no restriction, borderline air trapping, mild dec DLCO corrects for alveolar volume    Vitals:    07/20/20 1442   BP: 124/78   Pulse: 63   Temp: 97.1 °F (36.2 °C)   SpO2: 98%       Physical Exam   Constitutional: Oriented to person, place, and time. Appears  well-developed and well-nourished.   Head: Normocephalic and atraumatic.   Nose: Nose normal.   Mouth/Throat: Oropharynx with severe cobblestoning.  Eyes: Conjunctivae are normal.  Pupils normal.  Neck: No tracheal deviation present.   Cardiovascular: Normal rate, regular rhythm, normal heart sounds and intact distal pulses.  Exam reveals no gallop and no friction rub.  No thrill.  No JVD.  No edema.  No murmur heard.  Pulmonary/Chest: Effort normal and breath sounds normal.  No tenderness to palpation.  No clubbing.   Abdominal: Soft. Bowel sounds are normal. No distension. No tenderness. There is no guarding.   Musculoskeletal: Normal range of motion.  No tenderness.  Lymphadenopathy:  No cervical adenopathy.   Neurological:  No new focal neurological deficits observed   Skin: Skin is warm and dry. No rash noted.   Psychiatric: Normal mood and affect.  Behavior is normal. Judgment normal.    Assessment/Plan     1)  RUL GGN - stable x 1 year.  Would like to follow 3 years at least.  Repeat scan next July 2021.     2) Allergic Rhinitis - on flonase, antihistamine, singulair.  Add azelastine and sinus irrigation.  If no improvement could consider sinus CT and allergy testing with ENT or allergy referral.    3) Chronic Cough - tends to be seasonal or associate with URI. Has had persistent mild elevation in AEC and mild to borderline obstruction on PFTs.  Suspect cough variant asthma.  Given sample of Breo 100 to try when symptoms flare.  Can refill if needed.    RTC 1 year w/ CT Chest w/o contrast and joyce    Clarke Mason MD  Pulmonology and Critical Care Medicine  07/20/20 15:33  Electronically Signed    C.C.:  Eun Mcmanus MD, Eun Mcmanus MD

## 2020-08-01 DIAGNOSIS — K58.9 IRRITABLE BOWEL SYNDROME WITHOUT DIARRHEA: ICD-10-CM

## 2020-08-03 RX ORDER — FLUOXETINE HYDROCHLORIDE 20 MG/1
CAPSULE ORAL
Qty: 30 CAPSULE | Refills: 2 | Status: SHIPPED | OUTPATIENT
Start: 2020-08-03 | End: 2020-08-10

## 2020-08-09 DIAGNOSIS — K58.9 IRRITABLE BOWEL SYNDROME WITHOUT DIARRHEA: ICD-10-CM

## 2020-08-10 RX ORDER — FLUOXETINE HYDROCHLORIDE 20 MG/1
CAPSULE ORAL
Qty: 30 CAPSULE | Refills: 2 | Status: SHIPPED | OUTPATIENT
Start: 2020-08-10 | End: 2021-01-07

## 2020-09-14 RX ORDER — LISINOPRIL 40 MG/1
TABLET ORAL
Qty: 30 TABLET | Refills: 0 | Status: SHIPPED | OUTPATIENT
Start: 2020-09-14 | End: 2020-10-13

## 2020-10-09 DIAGNOSIS — I10 ESSENTIAL HYPERTENSION: Primary | ICD-10-CM

## 2020-10-09 RX ORDER — HYDROCHLOROTHIAZIDE 25 MG/1
TABLET ORAL
Qty: 30 TABLET | Refills: 11 | Status: SHIPPED | OUTPATIENT
Start: 2020-10-09 | End: 2021-09-08

## 2020-10-12 ENCOUNTER — TELEPHONE (OUTPATIENT)
Dept: INTERNAL MEDICINE | Facility: CLINIC | Age: 60
End: 2020-10-12

## 2020-10-12 DIAGNOSIS — I10 ESSENTIAL HYPERTENSION: Primary | ICD-10-CM

## 2020-10-12 NOTE — TELEPHONE ENCOUNTER
I believe medication had been increased to 40 mg daily.  Please check with the patient to see exactly what she has been taking and notify pharmacy.  Please make sure medication list is up-to-date.

## 2020-10-12 NOTE — TELEPHONE ENCOUNTER
[facility] calling to clarify Lisinopril increase. Stated patient usually receives 20mg. Just wants to clarify to increase. Please advise.

## 2020-10-12 NOTE — TELEPHONE ENCOUNTER
Spoke with Gaby   She states she sometimes takes 20mg or a 40 mg . She states she will discuss this with Dr Mcmanus at her next visit.   She would like the 40 mg dose filled at Pharmacy ,  Spoke with Sheri MO and confirmed 40 mg Rx   Verb understanding given

## 2020-10-13 RX ORDER — LISINOPRIL 40 MG/1
TABLET ORAL
Qty: 30 TABLET | Refills: 5 | Status: SHIPPED | OUTPATIENT
Start: 2020-10-13 | End: 2021-04-07 | Stop reason: ALTCHOICE

## 2020-10-13 NOTE — TELEPHONE ENCOUNTER
Prescription sent to the pharmacy.  Please call pharmacy and discontinue the Lisinopril 20 mg prescription.  I have discontinued it off the patient's medication list in the chart.

## 2020-10-15 NOTE — TELEPHONE ENCOUNTER
Called and left vm to discontinue the lisinopril 20 mg dose with the pharmacy to update her med list

## 2020-11-25 ENCOUNTER — TELEPHONE (OUTPATIENT)
Dept: INTERNAL MEDICINE | Facility: CLINIC | Age: 60
End: 2020-11-25

## 2020-11-25 NOTE — TELEPHONE ENCOUNTER
Spoke with Gaby   She wanted to know if she had to fast .  Notified her that she does.  Verb understanding given

## 2020-11-25 NOTE — TELEPHONE ENCOUNTER
PT IS REQUESTING A CALL BACK FROM [facility] REGARDING HER APPT ON 12/15.      PT WOULD LIKE TO RESCHEDULE AND COME IN SOONER IF POSSIBLE BUT WASN'T SURE WHAT THE APPT CONSIST OF.      PLEASE ADVISE  [phone]

## 2021-01-07 DIAGNOSIS — K58.9 IRRITABLE BOWEL SYNDROME WITHOUT DIARRHEA: ICD-10-CM

## 2021-01-07 RX ORDER — FLUOXETINE HYDROCHLORIDE 20 MG/1
CAPSULE ORAL
Qty: 30 CAPSULE | Refills: 11 | Status: SHIPPED | OUTPATIENT
Start: 2021-01-07 | End: 2021-12-13

## 2021-04-07 ENCOUNTER — OFFICE VISIT (OUTPATIENT)
Dept: INTERNAL MEDICINE | Facility: CLINIC | Age: 61
End: 2021-04-07

## 2021-04-07 VITALS
TEMPERATURE: 96.9 F | WEIGHT: 197 LBS | HEART RATE: 68 BPM | RESPIRATION RATE: 16 BRPM | SYSTOLIC BLOOD PRESSURE: 130 MMHG | DIASTOLIC BLOOD PRESSURE: 82 MMHG | BODY MASS INDEX: 33.6 KG/M2

## 2021-04-07 DIAGNOSIS — K63.5 BENIGN COLONIC POLYP: ICD-10-CM

## 2021-04-07 DIAGNOSIS — K21.9 GASTROESOPHAGEAL REFLUX DISEASE, UNSPECIFIED WHETHER ESOPHAGITIS PRESENT: ICD-10-CM

## 2021-04-07 DIAGNOSIS — R91.1 LUNG NODULE, SOLITARY: ICD-10-CM

## 2021-04-07 DIAGNOSIS — I10 ESSENTIAL HYPERTENSION: Primary | ICD-10-CM

## 2021-04-07 DIAGNOSIS — M25.551 RIGHT HIP PAIN: ICD-10-CM

## 2021-04-07 DIAGNOSIS — Z85.3 HISTORY OF LEFT BREAST CANCER: ICD-10-CM

## 2021-04-07 DIAGNOSIS — J30.2 SEASONAL ALLERGIC RHINITIS, UNSPECIFIED TRIGGER: ICD-10-CM

## 2021-04-07 LAB
ALBUMIN SERPL-MCNC: 4.8 G/DL (ref 3.5–5.2)
ALBUMIN/GLOB SERPL: 1.8 G/DL
ALP SERPL-CCNC: 67 U/L (ref 39–117)
ALT SERPL W P-5'-P-CCNC: 27 U/L (ref 1–33)
ANION GAP SERPL CALCULATED.3IONS-SCNC: 12.9 MMOL/L (ref 5–15)
AST SERPL-CCNC: 22 U/L (ref 1–32)
BASOPHILS # BLD AUTO: 0.08 10*3/MM3 (ref 0–0.2)
BASOPHILS NFR BLD AUTO: 1.8 % (ref 0–1.5)
BILIRUB SERPL-MCNC: 0.2 MG/DL (ref 0–1.2)
BUN SERPL-MCNC: 24 MG/DL (ref 6–20)
BUN/CREAT SERPL: 27.6 (ref 7–25)
CALCIUM SPEC-SCNC: 9.6 MG/DL (ref 8.6–10.5)
CHLORIDE SERPL-SCNC: 101 MMOL/L (ref 98–107)
CHOLEST SERPL-MCNC: 240 MG/DL (ref 0–200)
CO2 SERPL-SCNC: 27.1 MMOL/L (ref 22–29)
CREAT SERPL-MCNC: 0.87 MG/DL (ref 0.57–1)
DEPRECATED RDW RBC AUTO: 46.7 FL (ref 37–54)
EOSINOPHIL # BLD AUTO: 0.27 10*3/MM3 (ref 0–0.4)
EOSINOPHIL NFR BLD AUTO: 6.1 % (ref 0.3–6.2)
ERYTHROCYTE [DISTWIDTH] IN BLOOD BY AUTOMATED COUNT: 13.1 % (ref 12.3–15.4)
GFR SERPL CREATININE-BSD FRML MDRD: 67 ML/MIN/1.73
GLOBULIN UR ELPH-MCNC: 2.7 GM/DL
GLUCOSE SERPL-MCNC: 124 MG/DL (ref 65–99)
HCT VFR BLD AUTO: 39.4 % (ref 34–46.6)
HDLC SERPL-MCNC: 64 MG/DL (ref 40–60)
HGB BLD-MCNC: 12.9 G/DL (ref 12–15.9)
IMM GRANULOCYTES # BLD AUTO: 0.01 10*3/MM3 (ref 0–0.05)
IMM GRANULOCYTES NFR BLD AUTO: 0.2 % (ref 0–0.5)
LDLC SERPL CALC-MCNC: 139 MG/DL (ref 0–100)
LDLC/HDLC SERPL: 2.09 {RATIO}
LYMPHOCYTES # BLD AUTO: 1.22 10*3/MM3 (ref 0.7–3.1)
LYMPHOCYTES NFR BLD AUTO: 27.5 % (ref 19.6–45.3)
MCH RBC QN AUTO: 31.7 PG (ref 26.6–33)
MCHC RBC AUTO-ENTMCNC: 32.7 G/DL (ref 31.5–35.7)
MCV RBC AUTO: 96.8 FL (ref 79–97)
MONOCYTES # BLD AUTO: 0.51 10*3/MM3 (ref 0.1–0.9)
MONOCYTES NFR BLD AUTO: 11.5 % (ref 5–12)
NEUTROPHILS NFR BLD AUTO: 2.34 10*3/MM3 (ref 1.7–7)
NEUTROPHILS NFR BLD AUTO: 52.9 % (ref 42.7–76)
NRBC BLD AUTO-RTO: 0 /100 WBC (ref 0–0.2)
PLATELET # BLD AUTO: 288 10*3/MM3 (ref 140–450)
PMV BLD AUTO: 11.9 FL (ref 6–12)
POTASSIUM SERPL-SCNC: 4.4 MMOL/L (ref 3.5–5.2)
PROT SERPL-MCNC: 7.5 G/DL (ref 6–8.5)
RBC # BLD AUTO: 4.07 10*6/MM3 (ref 3.77–5.28)
SODIUM SERPL-SCNC: 141 MMOL/L (ref 136–145)
TRIGL SERPL-MCNC: 210 MG/DL (ref 0–150)
VLDLC SERPL-MCNC: 37 MG/DL (ref 5–40)
WBC # BLD AUTO: 4.43 10*3/MM3 (ref 3.4–10.8)

## 2021-04-07 PROCEDURE — 80053 COMPREHEN METABOLIC PANEL: CPT | Performed by: INTERNAL MEDICINE

## 2021-04-07 PROCEDURE — 85025 COMPLETE CBC W/AUTO DIFF WBC: CPT | Performed by: INTERNAL MEDICINE

## 2021-04-07 PROCEDURE — 80061 LIPID PANEL: CPT | Performed by: INTERNAL MEDICINE

## 2021-04-07 PROCEDURE — 99214 OFFICE O/P EST MOD 30 MIN: CPT | Performed by: INTERNAL MEDICINE

## 2021-04-07 RX ORDER — VALSARTAN 80 MG/1
80 TABLET ORAL DAILY
Qty: 30 TABLET | Refills: 0 | Status: SHIPPED | OUTPATIENT
Start: 2021-04-07 | End: 2021-04-29

## 2021-04-07 NOTE — PROGRESS NOTES
Subjective       Gaby Ratliff is a 60 y.o. female.     Chief Complaint   Patient presents with   • Hypertension     6mo f/u       History obtained from the patient.      History of Present Illness     The patient was diagnosed with a right upper lobe Pulmonary Nodule in April 2014, per CT Chest done at [facility].  CT Chest done at [facility] on 7/9/2014 was read as a stable right middle lobe lung nodule.  CT Chest done on 4/19/2017, at  [facility] showed the right middle lobe nodule to be slightly smaller than the previous CT.  On 7/31/2019, CT chest at [facility] showed a right upper lobe 1 cm nodule.  Right middle lobe nodule had decreased in size.  She saw Dr. Mason, who recommended a 3-month follow-up CT. due to the COVID-19 pandemic, but this did not get done until 7/20/2020.  CT showed a RUL ground glass nodule, stable x1 year.  She saw Dr. Mason same day, and he recommended a 1 year follow-up.  The patient states she has had postnasal drainage and a wet cough since December 2019, although it was resolved the time of her last visit with me.  She is off Breo Ellipta and Astepro, as they provided no relief.    The patient is followed by Dr. Ortiz for a history of Left Breast Cancer, last visit 6/8/2020.   Mammogram on 6/3/2020 was category 2. She denies any breast symptoms.     The patient has Chronic Low Back Pain, stable on Tylenol as needed.  She also takes Glucosamine/chondroitin. She is no longer seeing a Chiropractor.   She is complaining of right hip pain and is requesting referral to Orthopedics.     Primary Care Cardiac [facility]: The patient is here today for a follow-up visit.      Her Hypertension has been overall stable.   Medication(s): Lisinopril, HCTZ, and Aspirin.   The patient is adherent with her medication regimen.   Medication Side Effects: Gets dizzy when she gets up and down doing gardening and yard work.      Procedures: On 7/16/2019, Cardiac CT scan  showed Calcium Score 0.     Interval Events:   Blood pressure at home is been 130's/60's.  On 6/30/2020, LDL was 144, .  Of note, fasting glucose on 3/20/2020 was 116.  Hemoglobin A1c on 6/30/2020 was 5.4.    Symptoms: Lightheaded and dizzy with position changes as above.  Denies chest pain, dyspnea, MELISSA, orthopnea, PND, palpitations, syncope, lower extremity edema, and claudication.   Associated Symptoms: Weight up 6 pounds since last visit.  No fatigue, headaches, polyuria, polydipsia, arthralgias, myalgias, visual impairment, memory loss, concentration issues, or focal neurological deficit.      Lifestyle and Disease Management: Diet: She consumes a diverse and healthy diet.   Weight Issues: She has weight concerns. Exercise: She started walking 2 miles per day last week.    Tobacco Use: Former smoker.      GERD Follow-up: The patient is being seen for a routine clinic follow-up of Gastroesophageal Reflux Disease, which is stable.   Comorbid Illness: Irritable Bowel Syndrome.  Interval Events: None.  Symptoms: No abdominal pain, heartburn, acid regurgitation, nausea, vomiting, hematemesis, dysphagia, odynophagia, hematochezia, melena, early satiety, belching, or bloating.   Associated Symptoms: No chronic sore throat, cough, hoarseness, or wheezing.   Medications:  Off Omeprazole.  Takes Prozac for IBS.        Colonic Polyp Follow-up: The patient is being seen for a routine clinic follow-up of Colon Polyp(s), which is stable.   Pertinent Medical History: Anal Fissure.  Interval Events:  Current diagnosis was determined by Colonoscopy and last 12/13/17, no polyps.   Symptoms:   No abdominal pain, diarrhea, constipation, hematochezia, melena, decreased stool caliber, or change in bowel habits.    Associated Symptoms: no rectal prolapse.   Medication:   Align.     Current Outpatient Medications on File Prior to Visit   Medication Sig Dispense Refill   • aspirin 81 MG EC tablet Take 81 mg by mouth Daily.     •  calcium citrate-vitamin d (CALCITRATE) 315-250 MG-UNIT tablet tablet Take 2 tablets by mouth Daily.     • Docusate Calcium (STOOL SOFTENER PO) 2 capsules Daily.     • FLUoxetine (PROzac) 20 MG capsule TAKE ONE CAPSULE BY MOUTH DAILY 30 capsule 11   • fluticasone (FLONASE) 50 MCG/ACT nasal spray into each nostril daily.     • Glucos-Chondroit-Hyaluron-MSM (GLUCOSAMINE CHONDROITIN JOINT PO) 2 tablets Daily.     • hydroCHLOROthiazide (HYDRODIURIL) 25 MG tablet TAKE ONE TABLET BY MOUTH DAILY 30 tablet 11   • Loratadine (CLARITIN) 10 MG capsule Take  by mouth.     • montelukast (SINGULAIR) 10 MG tablet Take 1 tablet by mouth Daily. 30 tablet 9   • Multiple Vitamins-Minerals (MULTI VITAMIN/MINERALS PO) Take  by mouth daily.     • Probiotic Product (ALIGN) 4 MG capsule 1 capsule Daily.     • acetaminophen (TYLENOL) 500 MG tablet Take 1,000 mg by mouth 2 (Two) Times a Day As Needed for Mild Pain .     • [DISCONTINUED] aspirin 81 MG tablet Take 3 tablets by mouth daily.     • [DISCONTINUED] azelastine (ASTEPRO) 0.15 % solution nasal spray 2 sprays each nostril bid 1 each 11   • [DISCONTINUED] diphenhydrAMINE-acetaminophen (TYLENOL PM)  MG tablet per tablet Take 1 tablet by mouth At Night As Needed for Sleep.     • [DISCONTINUED] omeprazole (priLOSEC) 20 MG capsule 1 capsule Daily.     • [DISCONTINUED] Probiotic Product (ALIGN) 4 MG capsule Align 4 mg capsule   Take 1 capsule every day by oral route.       No current facility-administered medications on file prior to visit.       Current outpatient and discharge medications have been reconciled for the patient.  Reviewed by: Eun Mcmanus MD        The following portions of the patient's history were reviewed and updated as appropriate: allergies, current medications, past family history, past medical history, past social history, past surgical history and problem list.    Review of Systems   Constitutional: Negative for fatigue and unexpected weight change.   HENT:  Positive for postnasal drip.    Eyes: Negative for visual disturbance.   Respiratory: Positive for cough. Negative for shortness of breath and wheezing.    Cardiovascular: Negative for chest pain, palpitations and leg swelling.        No MELISSA, orthopnea, or claudication.   Gastrointestinal: Negative for abdominal pain, blood in stool, constipation, diarrhea, nausea and vomiting.        Denies melena.   Endocrine: Negative for polydipsia and polyuria.   Musculoskeletal: Negative for arthralgias and myalgias.   Neurological: Negative for dizziness, syncope, light-headedness and headaches.        No memory issues.   Psychiatric/Behavioral: Negative for decreased concentration.         Objective       Blood pressure 130/82, pulse 68, temperature 96.9 °F (36.1 °C), temperature source Infrared, resp. rate 16, weight 89.4 kg (197 lb), not currently breastfeeding.  Body mass index is 33.6 kg/m².      Physical Exam  Vitals and nursing note reviewed.   Constitutional:       Appearance: She is well-developed. She is obese.   Neck:      Thyroid: No thyroid mass or thyromegaly.      Vascular: No carotid bruit.   Cardiovascular:      Rate and Rhythm: Normal rate and regular rhythm.      Pulses: Normal pulses.      Heart sounds: Normal heart sounds. No murmur heard.   No friction rub. No gallop.    Pulmonary:      Effort: Pulmonary effort is normal.      Breath sounds: Normal breath sounds.   Abdominal:      General: Bowel sounds are normal. There is no distension or abdominal bruit.      Palpations: Abdomen is soft. There is no hepatomegaly, splenomegaly or mass.      Tenderness: There is no abdominal tenderness.   Musculoskeletal:      Cervical back: Normal range of motion and neck supple.      Right lower leg: No edema.      Left lower leg: No edema.   Neurological:      Mental Status: She is alert.   Psychiatric:         Mood and Affect: Mood normal.         Assessment / Plan:  Diagnoses and all orders for this visit:    1.  Essential hypertension (Primary)  -     Lipid Panel  -     Comprehensive Metabolic Panel  -     CBC & Differential  -     valsartan (Diovan) 80 MG tablet; Take 1 tablet by mouth Daily.  Dispense: 30 tablet; Refill: 0   Lisinopril discontinued (possible cause of cough).    2. Gastroesophageal reflux disease, unspecified whether esophagitis present   Stable, no medication.    3. Benign colonic polyp   Colonoscopy up-to-date.    4. Lung nodule, solitary   Follow up with Dr. Mason.    5. Seasonal allergic rhinitis, unspecified trigger  -     Ambulatory Referral to Allergy (patient request)    6. History of left breast cancer   Follow-up with Dr. Ortiz.    7. Right hip pain  -     Ambulatory Referral to Orthopedic Surgery   The patient declined a referral to PT.    The patient agrees to call with the date and results of her last Pap Smear.    Recommended Shingrix (new Shingles vaccine) at the pharmacy and Td/Tdap vaccine (Tetanus booster) in office or at the pharmacy, after your second COVID-19 vaccine.  If you have already had tetanus booster, please send me the date.      Return in about 6 months (around 10/7/2021) for Annual physical, fasting.

## 2021-04-07 NOTE — PATIENT INSTRUCTIONS
Please call with the date and results of your last Pap smear.    I recommend Shingrix (new Shingles vaccine) at the pharmacy and Td/Tdap vaccine (Tetanus booster) in office or at the pharmacy, after your second COVID-19 vaccine.  If you have already had tetanus booster, please send me the date.

## 2021-04-16 DIAGNOSIS — J30.2 SEASONAL ALLERGIC RHINITIS, UNSPECIFIED TRIGGER: ICD-10-CM

## 2021-04-16 RX ORDER — MONTELUKAST SODIUM 10 MG/1
10 TABLET ORAL DAILY
Qty: 90 TABLET | Refills: 3 | Status: SHIPPED | OUTPATIENT
Start: 2021-04-16 | End: 2021-04-29

## 2021-04-29 ENCOUNTER — OFFICE VISIT (OUTPATIENT)
Dept: ORTHOPEDIC SURGERY | Facility: CLINIC | Age: 61
End: 2021-04-29

## 2021-04-29 VITALS
WEIGHT: 197.09 LBS | DIASTOLIC BLOOD PRESSURE: 80 MMHG | BODY MASS INDEX: 33.65 KG/M2 | SYSTOLIC BLOOD PRESSURE: 140 MMHG | HEIGHT: 64 IN

## 2021-04-29 DIAGNOSIS — M25.551 RIGHT HIP PAIN: ICD-10-CM

## 2021-04-29 DIAGNOSIS — M47.816 LUMBAR SPONDYLOSIS: Primary | ICD-10-CM

## 2021-04-29 DIAGNOSIS — J30.2 SEASONAL ALLERGIC RHINITIS, UNSPECIFIED TRIGGER: ICD-10-CM

## 2021-04-29 PROCEDURE — 99244 OFF/OP CNSLTJ NEW/EST MOD 40: CPT | Performed by: ORTHOPAEDIC SURGERY

## 2021-04-29 RX ORDER — METHOCARBAMOL 750 MG/1
750 TABLET, FILM COATED ORAL 4 TIMES DAILY PRN
Qty: 60 TABLET | Refills: 0 | Status: SHIPPED | OUTPATIENT
Start: 2021-04-29 | End: 2021-06-10

## 2021-04-29 RX ORDER — LISINOPRIL 40 MG/1
40 TABLET ORAL DAILY
COMMUNITY
Start: 2021-04-09 | End: 2021-05-19

## 2021-04-29 RX ORDER — MONTELUKAST SODIUM 10 MG/1
TABLET ORAL
Qty: 30 TABLET | Refills: 11 | Status: SHIPPED | OUTPATIENT
Start: 2021-04-29 | End: 2022-12-06 | Stop reason: SDUPTHER

## 2021-04-29 RX ORDER — MELOXICAM 15 MG/1
TABLET ORAL
Qty: 90 TABLET | Refills: 1 | Status: SHIPPED | OUTPATIENT
Start: 2021-04-29 | End: 2022-05-26

## 2021-04-29 NOTE — PROGRESS NOTES
Orthopaedic Clinic Note: Hip New Patient    Chief Complaint   Patient presents with   • Right Hip - Pain        HPI  Consult from: Eun Mcmanus MD    Gaby Ratliff is a 60 y.o. female who presents with right hip pain for 1 year(s). Onset atraumatic and gradual in nature. Pain is localized to gluteal region and lumbar spine and is a 7/10 on the pain scale.Pain is described as stabbing. Associated symptoms include pain and stiffness.  The pain is worse with walking, standing, sitting and climbing stairs; resting and pain medication and/or NSAID improve the pain. Previous treatments have included: NSAIDS and weight loss since symptom onset. Although some transient relief was reported with these interventions, these conservative measures have failed and symptoms have persisted. The patient is limited in daily activities and has had a significant decrease in quality of life as a result. She denies fevers, chills, or constitutional symptoms.    I have reviewed the following portions of the patient's history:History of Present Illness    Past Medical History:   Diagnosis Date   • Allergic rhinitis    • Anemia    • Benign colonic polyp     Description: dx 4/11   • Elevated C-reactive protein (CRP)     dx 5/12 (normal homocysteine level).  3/14- normal coronary calcium score 1.0   • Gastroesophageal reflux disease     Description: dx 2010- Dr Lozano   • History of echocardiogram 07/31/2019    normal (no aortic dilation). EF 60%   • History of esophagogastroduodenoscopy 04/2011    reflux esophagitis, gastritis, and gastric polyp   • History of left breast cancer     Description: Left DCIS dx 3/11   • History of varicella    • Hypertension     Description: dx 8/14.  Calcium Score 1.0 (3/14).   • Internal hemorrhoids     Dx by Colonoscopy 12/17   • Irritable bowel syndrome     Description: dx approx 1979- Dr. Lozano   • Lung nodule, solitary     Dx 4/14- RUL   • Menopausal state     12/16.   • Screening for cardiovascular  condition 2019    Coronary Calcium Score 0   • Sigmoid diverticulosis     Dx by Colonoscopy       Past Surgical History:   Procedure Laterality Date   • APPENDECTOMY      approx   • BREAST LUMPECTOMY Left 2011    DCIS   •  SECTION       and       Family History   Problem Relation Age of Onset   • Coronary artery disease Mother         50's   • Diabetes Mother    • Hyperlipidemia Mother    • Hypertension Mother    • Coronary artery disease Father         30's   • Bipolar disorder Sister    • Diabetes Sister    • Hypertension Sister    • Diabetes Sister    • Hypertension Sister      Social History     Socioeconomic History   • Marital status:      Spouse name: Not on file   • Number of children: 2   • Years of education: Not on file   • Highest education level: Not on file   Tobacco Use   • Smoking status: Former Smoker     Packs/day: 1.00     Years: 10.00     Pack years: 10.00     Types: Cigarettes     Quit date:      Years since quittin.3   • Smokeless tobacco: Never Used   • Tobacco comment: 1/2 ppd x 4-6 years in high school   Substance and Sexual Activity   • Alcohol use: Yes     Comment: 1-3 glasses of wine daily   • Drug use: No   • Sexual activity: Yes     Partners: Male     Birth control/protection: Post-menopausal      Current Outpatient Medications on File Prior to Visit   Medication Sig Dispense Refill   • acetaminophen (TYLENOL) 500 MG tablet Take 1,000 mg by mouth 2 (Two) Times a Day As Needed for Mild Pain .     • aspirin 81 MG EC tablet Take 81 mg by mouth Daily.     • calcium citrate-vitamin d (CALCITRATE) 315-250 MG-UNIT tablet tablet Take 2 tablets by mouth Daily.     • Docusate Calcium (STOOL SOFTENER PO) 2 capsules Daily.     • FLUoxetine (PROzac) 20 MG capsule TAKE ONE CAPSULE BY MOUTH DAILY 30 capsule 11   • fluticasone (FLONASE) 50 MCG/ACT nasal spray into each nostril daily.     • Glucos-Chondroit-Hyaluron-MSM (GLUCOSAMINE CHONDROITIN JOINT PO)  "2 tablets Daily.     • hydroCHLOROthiazide (HYDRODIURIL) 25 MG tablet TAKE ONE TABLET BY MOUTH DAILY 30 tablet 11   • lisinopril (PRINIVIL,ZESTRIL) 40 MG tablet Take 40 mg by mouth Daily.     • Loratadine (CLARITIN) 10 MG capsule Take  by mouth.     • Multiple Vitamins-Minerals (MULTI VITAMIN/MINERALS PO) Take  by mouth daily.     • Probiotic Product (ALIGN) 4 MG capsule 1 capsule Daily.     • [DISCONTINUED] montelukast (SINGULAIR) 10 MG tablet Take 1 tablet by mouth Daily. 90 tablet 3   • [DISCONTINUED] valsartan (Diovan) 80 MG tablet Take 1 tablet by mouth Daily. 30 tablet 0     No current facility-administered medications on file prior to visit.      Allergies   Allergen Reactions   • Penicillins Rash   • Tamoxifen Other (See Comments)     Feeling hateful and mean    • Bupropion GI Intolerance   • Levofloxacin Unknown - Low Severity        Review of Systems   Constitutional: Negative.    HENT: Positive for congestion, drooling and postnasal drip.    Eyes: Positive for discharge.   Respiratory: Positive for cough.    Cardiovascular: Negative.    Gastrointestinal: Positive for anal bleeding.   Endocrine: Negative.    Genitourinary: Negative.    Musculoskeletal: Positive for arthralgias and back pain.   Skin: Negative.    Allergic/Immunologic: Positive for environmental allergies.   Neurological: Negative.    Hematological: Negative.    Psychiatric/Behavioral: Negative.         The patient's Review of Systems was personally reviewed and confirmed as accurate.    The following portions of the patient's history were reviewed and updated as appropriate: allergies, current medications, past family history, past medical history, past social history, past surgical history and problem list.    Physical Exam  Blood pressure 140/80, height 163.1 cm (64.21\"), weight 89.4 kg (197 lb 1.5 oz), not currently breastfeeding.    Body mass index is 33.61 kg/m².    GENERAL APPEARANCE: awake, alert & oriented x 3, in no acute distress " and well developed, well nourished  PSYCH: normal affect  LUNGS:  breathing nonlabored  EYES: sclera anicteric  CARDIOVASCULAR: palpable dorsalis pedis, palpable posterior tibial bilaterally. Capillary refill less than 2 seconds  EXTREMITIES: no clubbing, cyanosis  GAIT:  Normal           Right Hip Exam:  RANGE OF MOTION:   FLEXION CONTRACTURE: None   FLEXION: 110 degrees   INTERNAL ROTATION: 20 degrees at 90 degrees of flexion   EXTERNAL ROTATION: 40 degrees at 90 degrees of flexion    PAIN WITH HIP MOTION: no  PAIN WITH LOGROLL: no  STINCHFIELD TEST: negative    KNEE EXAM: full knee ROM (0-120 degrees), stable to varus and valgus stress at terminal extension and 30 degrees flexion     STRENGTH:  5/5 hip adduction, abduction, flexion. 5/5 strength knee flexion, extension. 5/5 strength ankle dorsiflexion and plantarflexion.     GREATER TROCHANTER BURSAL PAIN:  No  Tender to palpation at the lumbar paraspinal musculature     REFLEXES:   PATELLAR 2+/4   ACHILLES 2+/4    CLONUS: negative  STRAIGHT LEG TEST:   negative    SENSATION TO LIGHT TOUCH:  DEEP PERONEAL/SUPERFICIAL PERONEAL/SURAL/SAPHENOUS/TIBIAL:  intact    EDEMA:   no  ERYTHEMA:  no  WOUNDS/INCISIONS: no overlying skin problems.      Left Hip Exam:   RANGE OF MOTION:   FLEXION CONTRACTURE: None   FLEXION: 110 degrees   INTERNAL ROTATION: 20 degrees at 90 degrees of flexion   EXTERNAL ROTATION: 40 degrees at 90 degrees of flexion    PAIN WITH HIP MOTION: no  PAIN WITH LOGROLL: no  STINCHFIELD TEST: negative    KNEE EXAM: full knee ROM (0-120 degrees), stable to varus and valgus stress at terminal extension and 30 degrees flexion     STRENGTH:  5/5 hip adduction, abduction, flexion. 5/5 strength knee flexion, extension. 5/5 strength ankle dorsiflexion and plantarflexion.     GREATER TROCHANTER BURSAL PAIN:  no     REFLEXES:   PATELLAR 2+/4   ACHILLES 2+/4    CLONUS: negative  STRAIGHT LEG TEST:   negative    SENSATION TO LIGHT TOUCH:  DEEP PERONEAL/SUPERFICIAL  PERONEAL/SURAL/SAPHENOUS/TIBIAL:  intact    EDEMA:   no  ERYTHEMA:  no  WOUNDS/INCISIONS: no overlying skin problems.      ------------------------------------------------------------------    LEG LENGTHS:  equal  _____________________________________________________  _____________________________________________________    RADIOGRAPHIC FINDINGS:   Indication: Right hip pain    Comparison: No prior xrays are available for comparison    AP pelvis, hip 2 views: Right: mild joint space narrowing, minimal osteophyte formation; Left: mild joint space narrowing, minimal osteophyte formation    Assessment/Plan:   Diagnosis Plan   1. Lumbar spondylosis  meloxicam (MOBIC) 15 MG tablet    methocarbamol (ROBAXIN) 750 MG tablet   2. Right hip pain  XR Hip With or Without Pelvis 2 - 3 View Right     Patient's pain is actually due to lumbar spondylosis.  She has symmetric and asymptomatic hip range of motion.  I discussed treatment options regarding her lumbar spondylosis.  She is agreeable to prescription anti-inflammatory as well as muscle relaxer.  I instructed her on core strengthening as well as strengthening of her hip and back muscles.  She will follow-up with me as needed.    Dustin Petty MD  04/29/21  14:56 EDT

## 2021-05-05 DIAGNOSIS — I10 ESSENTIAL HYPERTENSION: ICD-10-CM

## 2021-05-05 RX ORDER — VALSARTAN 80 MG/1
TABLET ORAL
Qty: 30 TABLET | Refills: 0 | OUTPATIENT
Start: 2021-05-05

## 2021-05-05 NOTE — TELEPHONE ENCOUNTER
Spoke with Gaby Peterson gave her headaches . Allergy list updated.  She is taking Lisinopril 40 mg daily   Again    She states she went to Dr Andriy Hill with pinched nerve.  He gave meloxicam  And methocarbamol    She is wondering if she can still take omeprazole as she looked at the visit summary and it said to stop medication   Please advise

## 2021-05-05 NOTE — TELEPHONE ENCOUNTER
Last Office Visit: 04/07/2021  Next Office Visit: 10/14/2021    Labs completed in past 6 months? yes  Labs completed in past year? yes    Last Refill Date: 04/07/2021  Quantity: 30  Refills: 0     Pharmacy: [facility] [address] [place] [address] - [address] [place] [address] AT [facility] & [facility] [address] - [phone]  - [phone] [address] [address]   Phone:  [phone]  Fax:  [phone]    Please review pended refill request for any changes needed on refills or quantities. Thank you!

## 2021-05-06 NOTE — TELEPHONE ENCOUNTER
August 8, 2018     Matthew Yap MD  [address] [facility] [address]    Patient: Matt Hammonds   YOB: 1966   Date of Visit: 8/8/2018       Dear Dr Elizbeth Leyden: Thank you for referring Matt Hammonds to me for evaluation  Below are my notes for this consultation  If you have questions, please do not hesitate to call me  I look forward to following your patient along with you  Sincerely,        Robbin Willingham MD        CC: No Recipients  Robbin Willingham MD  8/8/2018 10:08 AM  Sign at close encounter    HPI:  Follow-up visit for  Stage IV breast cancer with metastasis and patient has been on Herceptin, Perjeta and tamoxifen and is doing well  Has some anxiety as before especially she will be going for scans and not knowing what the results are going to be  She has  1  Malignant neoplasm of upper-outer quadrant of left female breast (174 4) (C50 412)   2  Malignant neoplasm metastatic to lymph node of axilla (196 3) (C77 3)   3  Liver metastases (197 7) (C78 7)   4  Bone metastases (198 5) (C79 51)       Patient is being treated for stage IV breast cancer and she is on Perjeta, Herceptin and tamoxifen   She is not on Xgeva anymore that she had for 2 years  She continues to take    calcium and vitamin-D for 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  she has been tolerating treatments without much problem  Left breast cancer was diagnosed in January 2016  Grade was 3  She had locally advanced left breast cancer with metastases to liver and bones  She was started on a combination of Taxotere and Herceptin and Perjeta  She had very good response  After 6 cycles of systemic therapy Taxotere was discontinued and tamoxifen was added  Because her response was so good she had lumpectomy of left breast in September 2016 followed by radiation therapy and also she received radiation to her left hip for palliation   At the time of lumpectomy there was Gaby notified   Verb understanding given   minimal residual disease, 1 4 cm  Lymph nodes were not sampled  She had radiation to left hip lesion    She has chemotherapy-induced amenorrhea  She gets hot flashes  Occasionally leg cramps at night     Negative MRI scan of the brain for metastatic disease  Normal ejection fraction  she gets cardiac evaluation every 3 months  Overall she is doing reasonably well  For leg cramps at night and she is taking one baby aspirin daily with food in the evening and also one magnesium tablet daily     She has Port-A-Cath  Current Outpatient Prescriptions:     Calcium Carbonate-Vit D-Min (CALCIUM 1200 PO), Take 1 tablet by mouth daily  , Disp: , Rfl:     Magnesium 250 MG TABS, Take 1 tablet by mouth daily, Disp: , Rfl:     multivitamin (THERAGRAN) TABS, Take 1 tablet by mouth daily  , Disp: , Rfl:     pertuzumab (PERJETA) 420 mg/14 mL SOLN, Infuse into a venous catheter every 21 days  At infusion  center, Disp: , Rfl:     tamoxifen (NOLVADEX) 20 mg tablet, TAKE 1 TABLET DAILY  , Disp: 90 tablet, Rfl: 1    Trastuzumab (HERCEPTIN IV), Infuse into a venous catheter every 21 days  At infusion center, Disp: , Rfl:     VITAMIN D, CHOLECALCIFEROL, PO, Take by mouth daily  , Disp: , Rfl:     No Known Allergies       Malignant neoplasm of upper-inner quadrant of left female breast [facility])     Initial Diagnosis     Malignant neoplasm of upper-outer quadrant of left female breast ([facility] [address] )       1/27/2016 Surgery     Port placement         9/16/2016 Surgery     Left breast partial mastectomy         11/7/2016 - 12/23/2016 Radiation     WBRT          Radiation     Left hip RT          Chemotherapy     taxotere, herceptin, perjeta, xgeva     Dr Ely Herman     Tamoxifen   Dr Zafar Carr            ROS:  08/08/18 Reviewed 13 systems:  Presently no headaches, seizures, dizziness, diplopia, dysphagia, hoarseness, chest pain, palpitations, shortness of breath, cough, hemoptysis, abdominal pain, nausea, vomiting, Change in bowel movement, melena, hematuria, fever, chills, bleeding, bone pains, skin rash, weight loss, arthritic symptoms, tiredness ,  weakness, numbness,  claudication and gait problem  No frequent infections  No swelling of the ankles  No swollen glands  Patient is anxious  No GYN symptoms Other symptoms are in HPI        /96 (BP Location: Left arm, Cuff Size: Adult)   Pulse 90   Temp 98 8 °F (37 1 °C) (Tympanic)   Resp 17   Ht 5' 5" (1 651 m)   Wt 94 8 kg (209 lb)   SpO2 97%   BMI 34 78 kg/m²      Physical Exam:  Alert, oriented, not in distress, no icterus, no oral thrush, no palpable neck mass, clear lung fields, regular heart rate, abdomen  soft and non tender, no palpable abdominal mass, no ascites, no edema of ankles, no calf tenderness, no focal neurological deficit, no skin rash, no palpable lymphadenopathy, good arterial pulses, no clubbing  Patient is anxious  No lymphedema Performance status 1  IMAGING:   High normal ejection fraction on echocardiogram      Labs, Imaging, & Other studies:   All pertinent labs and imaging studies were personally reviewed    Lab Results   Component Value Date     04/27/2018    K 3 7 04/27/2018     (H) 04/27/2018    CO2 25 04/27/2018    ANIONGAP 9 04/27/2018    BUN 10 04/27/2018    CREATININE 1 10 04/27/2018    GLUCOSE 104 04/27/2018    GLUF 97 09/29/2017    CALCIUM 8 8 04/27/2018    AST 39 04/27/2018    ALT 51 04/27/2018    ALKPHOS 47 04/27/2018    PROT 6 3 (L) 04/27/2018    BILITOT 0 50 04/27/2018    EGFR 59 04/27/2018     Lab Results   Component Value Date    WBC 10 60 (H) 04/27/2018    HGB 12 5 04/27/2018    HCT 39 7 04/27/2018    MCV 87 04/27/2018     04/27/2018       Reviewed and discussed with patient  Assessment and plan: Follow-up visit for  Stage IV breast cancer with metastasis and patient has been on Herceptin, Perjeta and tamoxifen and is doing well    Has some anxiety as before especially she will be going for scans and not knowing what the results are going to be  She has  1  Malignant neoplasm of upper-outer quadrant of left female breast (174 4) (C50 412)   2  Malignant neoplasm metastatic to lymph node of axilla (196 3) (C77 3)   3  Liver metastases (197 7) (C78 7)   4  Bone metastases (198 5) (C79 51)       Patient is being treated for stage IV breast cancer and she is on Perjeta, Herceptin and tamoxifen   She is not on Xgeva anymore that she had for 2 years  She continues to take    calcium and vitamin-D for 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  she has been tolerating treatments without much problem  Left breast cancer was diagnosed in January 2016  Grade was 3  She had locally advanced left breast cancer with metastases to liver and bones  She was started on a combination of Taxotere and Herceptin and Perjeta  She had very good response  After 6 cycles of systemic therapy Taxotere was discontinued and tamoxifen was added  Because her response was so good she had lumpectomy of left breast in September 2016 followed by radiation therapy and also she received radiation to her left hip for palliation  At the time of lumpectomy there was minimal residual disease, 1 4 cm  Lymph nodes were not sampled  She had radiation to left hip lesion    She has chemotherapy-induced amenorrhea  She gets hot flashes  Occasionally leg cramps at night     Negative MRI scan of the brain for metastatic disease  Normal ejection fraction  she gets cardiac evaluation every 3 months  Overall she is doing reasonably well  For leg cramps at night and she is taking one baby aspirin daily with food in the evening and also one magnesium tablet daily     She has Port-A-Cath  1  Malignant neoplasm of upper-inner quadrant of left breast in female, estrogen receptor positive (HCC)    - Cancer antigen 27 29;  Standing  - CBC and differential; Standing  - Comprehensive metabolic panel; Standing  - Echo limited with contrast if indicated; Future  - CT chest abdomen pelvis w contrast; Future  - NM bone scan whole body; Future  - Cancer antigen 27 29  - CBC and differential  - Comprehensive metabolic panel    2  Bone metastasis ([address] )    - NM bone scan whole body; Future    3  Liver metastases ([address] )    - CT chest abdomen pelvis w contrast; Future     Goal is prolongation of progression-free survival   No change in therapy  Condition discussed and explained  Questions answered  Also discussed the importance of self-breast examination, eating healthy foods, staying active and health screening tests  Patient voiced understanding and agreement in the discussion  Counseling / Coordination of Care   Greater than 50% of total time was spent with the patient and / or family counseling and / or coordination of care

## 2021-05-16 DIAGNOSIS — I10 ESSENTIAL HYPERTENSION: Primary | ICD-10-CM

## 2021-05-16 DIAGNOSIS — I10 ESSENTIAL HYPERTENSION: ICD-10-CM

## 2021-05-19 RX ORDER — VALSARTAN 80 MG/1
TABLET ORAL
Qty: 30 TABLET | Refills: 0 | OUTPATIENT
Start: 2021-05-19

## 2021-05-19 RX ORDER — LISINOPRIL 40 MG/1
TABLET ORAL
Qty: 30 TABLET | Refills: 11 | Status: SHIPPED | OUTPATIENT
Start: 2021-05-19 | End: 2021-10-14 | Stop reason: SINTOL

## 2021-06-10 DIAGNOSIS — M47.816 LUMBAR SPONDYLOSIS: ICD-10-CM

## 2021-06-10 RX ORDER — METHOCARBAMOL 750 MG/1
TABLET, FILM COATED ORAL
Qty: 60 TABLET | Refills: 0 | Status: SHIPPED | OUTPATIENT
Start: 2021-06-10 | End: 2021-07-28

## 2021-07-14 ENCOUNTER — HOSPITAL ENCOUNTER (OUTPATIENT)
Dept: CT IMAGING | Facility: HOSPITAL | Age: 61
Discharge: HOME | End: 2021-07-14
Admitting: INTERNAL MEDICINE

## 2021-07-14 DIAGNOSIS — R91.1 LUNG NODULE: ICD-10-CM

## 2021-07-14 PROCEDURE — 71250 CT THORAX DX C-: CPT

## 2021-07-22 DIAGNOSIS — Z01.812 BLOOD TESTS PRIOR TO TREATMENT OR PROCEDURE: Primary | ICD-10-CM

## 2021-07-23 ENCOUNTER — CLINICAL SUPPORT NO REQUIREMENTS (OUTPATIENT)
Dept: PULMONOLOGY | Facility: CLINIC | Age: 61
End: 2021-07-23

## 2021-07-23 DIAGNOSIS — Z01.812 BLOOD TESTS PRIOR TO TREATMENT OR PROCEDURE: ICD-10-CM

## 2021-07-23 PROCEDURE — U0004 COV-19 TEST NON-CDC HGH THRU: HCPCS | Performed by: INTERNAL MEDICINE

## 2021-07-23 PROCEDURE — 99211 OFF/OP EST MAY X REQ PHY/QHP: CPT | Performed by: INTERNAL MEDICINE

## 2021-07-24 LAB — SARS-COV-2 RNA NOSE QL NAA+PROBE: NOT DETECTED

## 2021-07-26 ENCOUNTER — TELEPHONE (OUTPATIENT)
Dept: INTERNAL MEDICINE | Facility: CLINIC | Age: 61
End: 2021-07-26

## 2021-07-26 ENCOUNTER — OFFICE VISIT (OUTPATIENT)
Dept: PULMONOLOGY | Facility: CLINIC | Age: 61
End: 2021-07-26

## 2021-07-26 VITALS
HEIGHT: 64 IN | SYSTOLIC BLOOD PRESSURE: 128 MMHG | DIASTOLIC BLOOD PRESSURE: 78 MMHG | TEMPERATURE: 98.7 F | WEIGHT: 197 LBS | BODY MASS INDEX: 33.63 KG/M2 | HEART RATE: 80 BPM | OXYGEN SATURATION: 98 %

## 2021-07-26 DIAGNOSIS — R91.1 LUNG NODULE: Primary | ICD-10-CM

## 2021-07-26 PROCEDURE — 94726 PLETHYSMOGRAPHY LUNG VOLUMES: CPT | Performed by: INTERNAL MEDICINE

## 2021-07-26 PROCEDURE — 86606 ASPERGILLUS ANTIBODY: CPT | Performed by: INTERNAL MEDICINE

## 2021-07-26 PROCEDURE — 87449 NOS EACH ORGANISM AG IA: CPT | Performed by: INTERNAL MEDICINE

## 2021-07-26 PROCEDURE — 94729 DIFFUSING CAPACITY: CPT | Performed by: INTERNAL MEDICINE

## 2021-07-26 PROCEDURE — 87385 HISTOPLASMA CAPSUL AG IA: CPT | Performed by: INTERNAL MEDICINE

## 2021-07-26 PROCEDURE — 94060 EVALUATION OF WHEEZING: CPT | Performed by: INTERNAL MEDICINE

## 2021-07-26 PROCEDURE — 86480 TB TEST CELL IMMUN MEASURE: CPT | Performed by: INTERNAL MEDICINE

## 2021-07-26 PROCEDURE — 86612 BLASTOMYCES ANTIBODY: CPT | Performed by: INTERNAL MEDICINE

## 2021-07-26 PROCEDURE — 99214 OFFICE O/P EST MOD 30 MIN: CPT | Performed by: INTERNAL MEDICINE

## 2021-07-26 RX ORDER — OMEPRAZOLE 20 MG/1
20 CAPSULE, DELAYED RELEASE ORAL EVERY OTHER DAY
COMMUNITY
End: 2022-12-06 | Stop reason: SDUPTHER

## 2021-07-26 RX ORDER — AMLODIPINE BESYLATE 10 MG/1
10 TABLET ORAL DAILY
Qty: 30 TABLET | Refills: 3 | Status: SHIPPED | OUTPATIENT
Start: 2021-07-26 | End: 2021-11-08

## 2021-07-26 RX ORDER — ALBUTEROL SULFATE 90 UG/1
4 AEROSOL, METERED RESPIRATORY (INHALATION) ONCE
Status: COMPLETED | OUTPATIENT
Start: 2021-07-26 | End: 2021-07-26

## 2021-07-26 RX ADMIN — ALBUTEROL SULFATE 4 PUFF: 90 AEROSOL, METERED RESPIRATORY (INHALATION) at 15:47

## 2021-07-26 NOTE — PROGRESS NOTES
CC:    Abnormal CT    HPI:    58 y/o WF w/ h/o minimal smoking in high school and college, allergic rhinitis, HTN on ACE, GERD, left sided breast cancer s/p lumpectomy 2011 (did not need chemo or RT), who presents today for evaluation of lung nodule.  Patient had prior imaging showing lung nodules consistent with granulomatous disease / histo.  She recently had a repeat CT for follow up ordered by her PCP.  There was a new GG nodule and she was sent for evaluation.  No symptoms attributable to this nodule.  She does have ongoing allergic rhinitis.  Seasonal allergies tend to be worse in spring and fall.  Also tends to get prolonged cough / bronchitis after getting URI's or allergies.  No wheezing or SOA.    Patient enjoys gardening and working outside as a hobby.  Has a [employer] [place].      INTERVAL HISTORY:    Patient returns today for follow up.  Still having some issues with allergic rhinitis and post nasal gtt.  On ACEI.  BP well controlled.  Has a new lung nodule on f/u imaging.  No symptoms attributable to this.    PMH:    Past Medical History:   Diagnosis Date   • Allergic rhinitis    • Anemia    • Benign colonic polyp     Description: dx 4/11   • Elevated C-reactive protein (CRP)     dx 5/12 (normal homocysteine level).  3/14- normal coronary calcium score 1.0   • Gastroesophageal reflux disease     Description: dx 2010- Dr Lozano   • History of echocardiogram 07/31/2019    normal (no aortic dilation). EF 60%   • History of esophagogastroduodenoscopy 04/2011    reflux esophagitis, gastritis, and gastric polyp   • History of left breast cancer     Description: Left DCIS dx 3/11   • History of varicella    • Hypertension     Description: dx 8/14.  Calcium Score 1.0 (3/14).   • Internal hemorrhoids     Dx by Colonoscopy 12/17   • Irritable bowel syndrome     Description: dx approx 1979- Dr. Lozano   • Lung nodule, solitary     Dx 4/14- RUL   • Menopausal state     12/16.   • Screening for cardiovascular condition  2019    Coronary Calcium Score 0   • Sigmoid diverticulosis     Dx by Colonoscopy      PSH:    Past Surgical History:   Procedure Laterality Date   • APPENDECTOMY      approx   • BREAST LUMPECTOMY Left 2011    DCIS   •  SECTION       and      FH:    Family History   Problem Relation Age of Onset   • Coronary artery disease Mother         50's   • Diabetes Mother    • Hyperlipidemia Mother    • Hypertension Mother    • Coronary artery disease Father         30's   • Bipolar disorder Sister    • Diabetes Sister    • Hypertension Sister    • Diabetes Sister    • Hypertension Sister      SH:    Social History     Socioeconomic History   • Marital status:      Spouse name: Not on file   • Number of children: 2   • Years of education: Not on file   • Highest education level: Not on file   Tobacco Use   • Smoking status: Former Smoker     Packs/day: 1.00     Years: 10.00     Pack years: 10.00     Types: Cigarettes     Quit date:      Years since quittin.5   • Smokeless tobacco: Never Used   • Tobacco comment:  ppd x 4-6 years in high school   Substance and Sexual Activity   • Alcohol use: Yes     Comment: 1-3 glasses of wine daily   • Drug use: No   • Sexual activity: Yes     Partners: Male     Birth control/protection: Post-menopausal     ALLERGIES:    Allergies   Allergen Reactions   • Penicillins Rash   • Tamoxifen Other (See Comments)     Feeling hateful and mean    • Bupropion GI Intolerance   • Levofloxacin Unknown - Low Severity   • Valsartan Headache     MEDICATIONS:      Current Outpatient Medications:   •  acetaminophen (TYLENOL) 500 MG tablet, Take 1,000 mg by mouth 2 (Two) Times a Day As Needed for Mild Pain ., Disp: , Rfl:   •  aspirin 81 MG EC tablet, Take 81 mg by mouth Daily., Disp: , Rfl:   •  calcium citrate-vitamin d (CALCITRATE) 315-250 MG-UNIT tablet tablet, Take 2 tablets by mouth Daily., Disp: , Rfl:   •  Docusate Calcium (STOOL SOFTENER PO), 2  capsules Daily., Disp: , Rfl:   •  FLUoxetine (PROzac) 20 MG capsule, TAKE ONE CAPSULE BY MOUTH DAILY, Disp: 30 capsule, Rfl: 11  •  fluticasone (FLONASE) 50 MCG/ACT nasal spray, into each nostril daily., Disp: , Rfl:   •  Glucos-Chondroit-Hyaluron-MSM (GLUCOSAMINE CHONDROITIN JOINT PO), 2 tablets Daily., Disp: , Rfl:   •  hydroCHLOROthiazide (HYDRODIURIL) 25 MG tablet, TAKE ONE TABLET BY MOUTH DAILY, Disp: 30 tablet, Rfl: 11  •  lisinopril (PRINIVIL,ZESTRIL) 40 MG tablet, TAKE 1 TABLET BY MOUTH EVERY DAY, Disp: 30 tablet, Rfl: 11  •  Loratadine (CLARITIN) 10 MG capsule, Take  by mouth., Disp: , Rfl:   •  Multiple Vitamins-Minerals (MULTI VITAMIN/MINERALS PO), Take  by mouth daily., Disp: , Rfl:   •  omeprazole (priLOSEC) 20 MG capsule, Take 20 mg by mouth Daily., Disp: , Rfl:   •  Probiotic Product (ALIGN) 4 MG capsule, 1 capsule Daily., Disp: , Rfl:   •  amLODIPine (Norvasc) 10 MG tablet, Take 1 tablet by mouth Daily., Disp: 30 tablet, Rfl: 3  •  meloxicam (MOBIC) 15 MG tablet, 1 PO Daily with food., Disp: 90 tablet, Rfl: 1  •  methocarbamol (ROBAXIN) 750 MG tablet, TAKE ONE TABLET BY MOUTH FOUR TIMES A DAY AS NEEDED FOR MUSCLE SPASMS, Disp: 60 tablet, Rfl: 0  •  montelukast (SINGULAIR) 10 MG tablet, TAKE ONE TABLET BY MOUTH DAILY, Disp: 30 tablet, Rfl: 11  ROS:  Per HPI, otherwise all systems reviewed and negative.    DIAGNOSTIC DATA (Reviewed and interpreted by me unless otherwise specified):    CT Chest 7/31/19 & 4/10/14 - scattered calcified granulomas, calcified mediastinal LN's, calcifications in spleen consistent with histo.  All of these areas stable.  Scarring in RML improving from 2014.  There are some new mild tree-in-bud opacities in RLL.  Very subtle GGO in RUL that is new.    CT Chest 7/15/20 - stable RUL GGN.    CT Chest 7/15/21 - stable RUL GGN, overall findings are stable from 7/2020 except for a new 2.3x1.3 cm nodule in the RML medial segment.  No mediastinal / hilar adenopathy.    PFT 9/17/19 -  borderline mild obstruction, no change w/ BD, no restriction, borderline air trapping, mild dec DLCO corrects for alveolar volume    PFT 7/26/21 - mild obstruction, +change w/ BD, no restriction, +air trapping, normal DLCO    Vitals:    07/26/21 1436   BP: 128/78   Pulse: 80   Temp: 98.7 °F (37.1 °C)   SpO2: 98%       Physical Exam   Constitutional: Oriented to person, place, and time. Appears well-developed and well-nourished.   Head: Normocephalic and atraumatic.   Nose: Nose normal.   Mouth/Throat: Oropharynx with severe cobblestoning.  Eyes: Conjunctivae are normal.  Pupils normal.  Neck: No tracheal deviation present.   Cardiovascular: Normal rate, regular rhythm, normal heart sounds and intact distal pulses.  Exam reveals no gallop and no friction rub.  No thrill.  No JVD.  No edema.  No murmur heard.  Pulmonary/Chest: Effort normal and breath sounds normal.  No tenderness to palpation.  No clubbing.   Abdominal: Soft. Bowel sounds are normal. No distension. No tenderness. There is no guarding.   Musculoskeletal: Normal range of motion.  No tenderness.  Lymphadenopathy:  No cervical adenopathy.   Neurological:  No new focal neurological deficits observed   Skin: Skin is warm and dry. No rash noted.   Psychiatric: Normal mood and affect.  Behavior is normal. Judgment normal.    Assessment/Plan     1)  Abnormal CT Chest - RUL GGN is stable.  Findings most c/w chronic histo stable.  NEW RML nodule in medial segment 2.3 x 1.3 cm.  Likes to garden and work outside as a hobby.  Has traveled internationally.  Very minimal smoking history in high school / college.  Father had lung cancer.  Overall I suspect this is benign, but given size needs more work-up.  Will get urine and serum fungal antigens / antibodies, fungitell, galactomannan, and quantiferon.  If any of these are positive make benign etiology more likely.  Also get PET-CT.  Will need to decide next visit if patient can do closer surveillance with CT in 3  months or navigational bronchoscopy + EBUS.     2) Allergic Rhinitis - on flonase, antihistamine.  Off singulair.  Didn't like azelastine.  Better with sinus irrigation (navage).     3) Chronic Cough - tends to be seasonal or associate with URI. Has had persistent mild elevation in AEC and mild to borderline obstruction on PFTs.  Suspect cough variant asthma.  Breo seemed to make things worse.  No overt wheezing.  ACEI could be playing a role.  Will sub this for norvasc (no compelling reason for ACEI, had headache with ARB).  Patient to monitor BP at home and let us or PCP know if elevated or any side effects.    RTC ~3 weeks with CT-PET and lab tests    Clarke Mason MD  Pulmonology and Critical Care Medicine  07/26/21 15:32 EDT  Electronically Signed    C.C.:  No ref. provider found, Eun Mcmanus MD

## 2021-07-28 DIAGNOSIS — M47.816 LUMBAR SPONDYLOSIS: ICD-10-CM

## 2021-07-28 RX ORDER — METHOCARBAMOL 750 MG/1
TABLET, FILM COATED ORAL
Qty: 60 TABLET | Refills: 0 | Status: SHIPPED | OUTPATIENT
Start: 2021-07-28 | End: 2022-06-07 | Stop reason: SDUPTHER

## 2021-07-29 LAB
GAMMA INTERFERON BACKGROUND BLD IA-ACNC: 0.01 IU/ML
H CAPSUL AG SER QL IA: <0.5
H CAPSUL AG UR QL IA: <0.5
Lab: NORMAL
Lab: NORMAL
M TB IFN-G BLD-IMP: NEGATIVE
M TB IFN-G CD4+ BCKGRND COR BLD-ACNC: 0 IU/ML
M TB IFN-G CD4+CD8+ BCKGRND COR BLD-ACNC: 0.03 IU/ML
MITOGEN IGNF BLD-ACNC: >10 IU/ML
SERVICE CMNT-IMP: NORMAL

## 2021-07-30 LAB
1,3 BETA GLUCAN SER-MCNC: <31 PG/ML
MVISTA(R) BLASTOMYCES AG: NORMAL NG/ML
SPECIMEN SOURCE: NORMAL

## 2021-07-31 LAB
A FLAVUS AB SER QL ID: NEGATIVE
A FUMIGATUS AB SER QL ID: NEGATIVE
A NIGER AB SER QL ID: NEGATIVE
B DERMAT AB TITR SER: NEGATIVE {TITER}

## 2021-08-02 NOTE — TELEPHONE ENCOUNTER
[place] with [place] the [place]  and she decided to continue with the lisinopril until she gets back to town .

## 2021-08-10 ENCOUNTER — HOSPITAL ENCOUNTER (OUTPATIENT)
Dept: PET IMAGING | Facility: HOSPITAL | Age: 61
Discharge: HOME | End: 2021-08-10

## 2021-08-10 ENCOUNTER — TELEPHONE (OUTPATIENT)
Dept: PULMONOLOGY | Facility: CLINIC | Age: 61
End: 2021-08-10

## 2021-08-10 DIAGNOSIS — R91.1 LUNG NODULE: Primary | ICD-10-CM

## 2021-08-10 DIAGNOSIS — R91.1 LUNG NODULE: ICD-10-CM

## 2021-08-10 LAB — GLUCOSE BLDC GLUCOMTR-MCNC: 99 MG/DL (ref 70–130)

## 2021-08-10 PROCEDURE — 0 FLUDEOXYGLUCOSE F18 SOLUTION: Performed by: INTERNAL MEDICINE

## 2021-08-10 PROCEDURE — 78815 PET IMAGE W/CT SKULL-THIGH: CPT

## 2021-08-10 PROCEDURE — 82962 GLUCOSE BLOOD TEST: CPT

## 2021-08-10 PROCEDURE — A9552 F18 FDG: HCPCS | Performed by: INTERNAL MEDICINE

## 2021-08-10 RX ADMIN — FLUDEOXYGLUCOSE F18 1 DOSE: 300 INJECTION INTRAVENOUS at 14:29

## 2021-08-10 NOTE — TELEPHONE ENCOUNTER
Called patient with results of PET-CT.  PET negative.  Plan on seeing back in 3 months w/ CT Chest ilogic.    Clarke Mason MD  Pulmonology and Critical Care Medicine  08/10/21 18:59 EDT  Electronically Signed

## 2021-09-06 ENCOUNTER — TELEPHONE (OUTPATIENT)
Dept: URGENT CARE | Facility: CLINIC | Age: 61
End: 2021-09-06

## 2021-09-06 PROCEDURE — 87086 URINE CULTURE/COLONY COUNT: CPT | Performed by: NURSE PRACTITIONER

## 2021-09-06 PROCEDURE — 87147 CULTURE TYPE IMMUNOLOGIC: CPT | Performed by: NURSE PRACTITIONER

## 2021-09-06 NOTE — TELEPHONE ENCOUNTER
Patient called to report that her pharmacy is closed for Labor Day.  She has requested prescriptions be sent to [facility] on [address] in [facility].  Instructed patient that if pharmacy closes prior to her being able to  prescription to call the clinic and we will make accommodations for her today.

## 2021-09-07 DIAGNOSIS — I10 ESSENTIAL HYPERTENSION: ICD-10-CM

## 2021-09-08 ENCOUNTER — TELEPHONE (OUTPATIENT)
Dept: URGENT CARE | Facility: CLINIC | Age: 61
End: 2021-09-08

## 2021-09-08 RX ORDER — HYDROCHLOROTHIAZIDE 25 MG/1
TABLET ORAL
Qty: 30 TABLET | Refills: 1 | Status: SHIPPED | OUTPATIENT
Start: 2021-09-08 | End: 2021-11-08

## 2021-10-14 ENCOUNTER — LAB (OUTPATIENT)
Dept: LAB | Facility: HOSPITAL | Age: 61
End: 2021-10-14

## 2021-10-14 ENCOUNTER — OFFICE VISIT (OUTPATIENT)
Dept: INTERNAL MEDICINE | Facility: CLINIC | Age: 61
End: 2021-10-14

## 2021-10-14 VITALS
DIASTOLIC BLOOD PRESSURE: 71 MMHG | TEMPERATURE: 97.3 F | SYSTOLIC BLOOD PRESSURE: 110 MMHG | HEIGHT: 64 IN | HEART RATE: 63 BPM | RESPIRATION RATE: 20 BRPM | BODY MASS INDEX: 33.53 KG/M2 | WEIGHT: 196.38 LBS

## 2021-10-14 DIAGNOSIS — K21.9 GASTROESOPHAGEAL REFLUX DISEASE, UNSPECIFIED WHETHER ESOPHAGITIS PRESENT: ICD-10-CM

## 2021-10-14 DIAGNOSIS — Z00.00 ENCOUNTER FOR HEALTH MAINTENANCE EXAMINATION IN ADULT: Primary | ICD-10-CM

## 2021-10-14 DIAGNOSIS — Z00.00 ENCOUNTER FOR HEALTH MAINTENANCE EXAMINATION IN ADULT: ICD-10-CM

## 2021-10-14 DIAGNOSIS — Z79.899 HIGH RISK MEDICATION USE: ICD-10-CM

## 2021-10-14 DIAGNOSIS — I10 PRIMARY HYPERTENSION: ICD-10-CM

## 2021-10-14 DIAGNOSIS — K63.5 BENIGN COLONIC POLYP: ICD-10-CM

## 2021-10-14 DIAGNOSIS — Z23 INFLUENZA VACCINE NEEDED: ICD-10-CM

## 2021-10-14 DIAGNOSIS — R91.1 LUNG NODULE, SOLITARY: ICD-10-CM

## 2021-10-14 DIAGNOSIS — Z23 NEED FOR TDAP VACCINATION: ICD-10-CM

## 2021-10-14 LAB
ALBUMIN SERPL-MCNC: 4.9 G/DL (ref 3.5–5.2)
ALBUMIN/GLOB SERPL: 2.1 G/DL
ALP SERPL-CCNC: 52 U/L (ref 39–117)
ALT SERPL W P-5'-P-CCNC: 22 U/L (ref 1–33)
ANION GAP SERPL CALCULATED.3IONS-SCNC: 16.4 MMOL/L (ref 5–15)
AST SERPL-CCNC: 24 U/L (ref 1–32)
BASOPHILS # BLD AUTO: 0.05 10*3/MM3 (ref 0–0.2)
BASOPHILS NFR BLD AUTO: 1.1 % (ref 0–1.5)
BILIRUB SERPL-MCNC: 0.4 MG/DL (ref 0–1.2)
BUN SERPL-MCNC: 25 MG/DL (ref 8–23)
BUN/CREAT SERPL: 26 (ref 7–25)
CALCIUM SPEC-SCNC: 9.8 MG/DL (ref 8.6–10.5)
CHLORIDE SERPL-SCNC: 103 MMOL/L (ref 98–107)
CHOLEST SERPL-MCNC: 269 MG/DL (ref 0–200)
CO2 SERPL-SCNC: 21.6 MMOL/L (ref 22–29)
CREAT SERPL-MCNC: 0.96 MG/DL (ref 0.57–1)
DEPRECATED RDW RBC AUTO: 43.9 FL (ref 37–54)
EOSINOPHIL # BLD AUTO: 0.21 10*3/MM3 (ref 0–0.4)
EOSINOPHIL NFR BLD AUTO: 4.5 % (ref 0.3–6.2)
ERYTHROCYTE [DISTWIDTH] IN BLOOD BY AUTOMATED COUNT: 12.7 % (ref 12.3–15.4)
GFR SERPL CREATININE-BSD FRML MDRD: 59 ML/MIN/1.73
GLOBULIN UR ELPH-MCNC: 2.3 GM/DL
GLUCOSE SERPL-MCNC: 111 MG/DL (ref 65–99)
HCT VFR BLD AUTO: 34.7 % (ref 34–46.6)
HDLC SERPL-MCNC: 56 MG/DL (ref 40–60)
HGB BLD-MCNC: 11.8 G/DL (ref 12–15.9)
IMM GRANULOCYTES # BLD AUTO: 0.01 10*3/MM3 (ref 0–0.05)
IMM GRANULOCYTES NFR BLD AUTO: 0.2 % (ref 0–0.5)
LDLC SERPL CALC-MCNC: 173 MG/DL (ref 0–100)
LDLC/HDLC SERPL: 3.04 {RATIO}
LYMPHOCYTES # BLD AUTO: 1.1 10*3/MM3 (ref 0.7–3.1)
LYMPHOCYTES NFR BLD AUTO: 23.8 % (ref 19.6–45.3)
MAGNESIUM SERPL-MCNC: 2.1 MG/DL (ref 1.6–2.4)
MCH RBC QN AUTO: 32.2 PG (ref 26.6–33)
MCHC RBC AUTO-ENTMCNC: 34 G/DL (ref 31.5–35.7)
MCV RBC AUTO: 94.8 FL (ref 79–97)
MONOCYTES # BLD AUTO: 0.34 10*3/MM3 (ref 0.1–0.9)
MONOCYTES NFR BLD AUTO: 7.4 % (ref 5–12)
NEUTROPHILS NFR BLD AUTO: 2.91 10*3/MM3 (ref 1.7–7)
NEUTROPHILS NFR BLD AUTO: 63 % (ref 42.7–76)
NRBC BLD AUTO-RTO: 0 /100 WBC (ref 0–0.2)
PLATELET # BLD AUTO: 265 10*3/MM3 (ref 140–450)
PMV BLD AUTO: 12.3 FL (ref 6–12)
POTASSIUM SERPL-SCNC: 4.3 MMOL/L (ref 3.5–5.2)
PROT SERPL-MCNC: 7.2 G/DL (ref 6–8.5)
RBC # BLD AUTO: 3.66 10*6/MM3 (ref 3.77–5.28)
SODIUM SERPL-SCNC: 141 MMOL/L (ref 136–145)
TRIGL SERPL-MCNC: 213 MG/DL (ref 0–150)
TSH SERPL DL<=0.05 MIU/L-ACNC: 1.49 UIU/ML (ref 0.27–4.2)
VLDLC SERPL-MCNC: 40 MG/DL (ref 5–40)
WBC # BLD AUTO: 4.62 10*3/MM3 (ref 3.4–10.8)

## 2021-10-14 PROCEDURE — 83735 ASSAY OF MAGNESIUM: CPT

## 2021-10-14 PROCEDURE — 85025 COMPLETE CBC W/AUTO DIFF WBC: CPT

## 2021-10-14 PROCEDURE — 90686 IIV4 VACC NO PRSV 0.5 ML IM: CPT | Performed by: INTERNAL MEDICINE

## 2021-10-14 PROCEDURE — 90472 IMMUNIZATION ADMIN EACH ADD: CPT | Performed by: INTERNAL MEDICINE

## 2021-10-14 PROCEDURE — 90471 IMMUNIZATION ADMIN: CPT | Performed by: INTERNAL MEDICINE

## 2021-10-14 PROCEDURE — 99396 PREV VISIT EST AGE 40-64: CPT | Performed by: INTERNAL MEDICINE

## 2021-10-14 PROCEDURE — 80053 COMPREHEN METABOLIC PANEL: CPT

## 2021-10-14 PROCEDURE — 84443 ASSAY THYROID STIM HORMONE: CPT

## 2021-10-14 PROCEDURE — 80061 LIPID PANEL: CPT

## 2021-10-14 PROCEDURE — 90715 TDAP VACCINE 7 YRS/> IM: CPT | Performed by: INTERNAL MEDICINE

## 2021-10-14 RX ORDER — ZINC GLUCONATE 50 MG
50 TABLET ORAL EVERY OTHER DAY
COMMUNITY
End: 2021-10-14

## 2021-10-14 RX ORDER — VIT C/B6/B5/MAGNESIUM/HERB 173 50-5-6-5MG
1 CAPSULE ORAL DAILY
COMMUNITY

## 2021-10-14 NOTE — PATIENT INSTRUCTIONS
I recommend Shingrix (new Shingles vaccine) and Covid 19 booster vaccination at the pharmacy.      Health Maintenance for Postmenopausal Women  Menopause is a normal process in which your ability to get pregnant comes to an end. This process happens slowly over many months or years, usually between the ages of 48 and 55. Menopause is complete when you have missed your menstrual periods for 12 months.  It is important to talk with your health care provider about some of the most common conditions that affect women after menopause (postmenopausal women). These include heart disease, cancer, and bone loss (osteoporosis). Adopting a healthy lifestyle and getting preventive care can help to promote your health and wellness. The actions you take can also lower your chances of developing some of these common conditions.  What should I know about menopause?  During menopause, you may get a number of symptoms, such as:  · Hot flashes. These can be moderate or severe.  · Night sweats.  · Decrease in sex drive.  · Mood swings.  · Headaches.  · Tiredness.  · Irritability.  · Memory problems.  · Insomnia.  Choosing to treat or not to treat these symptoms is a decision that you make with your health care provider.  Do I need hormone replacement therapy?  · Hormone replacement therapy is effective in treating symptoms that are caused by menopause, such as hot flashes and night sweats.  · Hormone replacement carries certain risks, especially as you become older. If you are thinking about using estrogen or estrogen with progestin, discuss the benefits and risks with your health care provider.  What is my risk for heart disease and stroke?  The risk of heart disease, heart attack, and stroke increases as you age. One of the causes may be a change in the body's hormones during menopause. This can affect how your body uses dietary fats, triglycerides, and cholesterol. Heart attack and stroke are medical emergencies. There are many  things that you can do to help prevent heart disease and stroke.  Watch your blood pressure  · High blood pressure causes heart disease and increases the risk of stroke. This is more likely to develop in people who have high blood pressure readings, are of  descent, or are overweight.  · Have your blood pressure checked:  ? Every 3-5 years if you are 18-39 years of age.  ? Every year if you are 40 years old or older.  Eat a healthy diet    · Eat a diet that includes plenty of vegetables, fruits, low-fat dairy products, and lean protein.  · Do not eat a lot of foods that are high in solid fats, added sugars, or sodium.    Get regular exercise  Get regular exercise. This is one of the most important things you can do for your health. Most adults should:  · Try to exercise for at least 150 minutes each week. The exercise should increase your heart rate and make you sweat (moderate-intensity exercise).  · Try to do strengthening exercises at least twice each week. Do these in addition to the moderate-intensity exercise.  · Spend less time sitting. Even light physical activity can be beneficial.  Other tips  · Work with your health care provider to achieve or maintain a healthy weight.  · Do not use any products that contain nicotine or tobacco, such as cigarettes, e-cigarettes, and chewing tobacco. If you need help quitting, ask your health care provider.  · Know your numbers. Ask your health care provider to check your cholesterol and your blood sugar (glucose). Continue to have your blood tested as directed by your health care provider.  Do I need screening for cancer?  Depending on your health history and family history, you may need to have cancer screening at different stages of your life. This may include screening for:  · Breast cancer.  · Cervical cancer.  · Lung cancer.  · Colorectal cancer.  What is my risk for osteoporosis?  After menopause, you may be at increased risk for osteoporosis. Osteoporosis  is a condition in which bone destruction happens more quickly than new bone creation. To help prevent osteoporosis or the bone fractures that can happen because of osteoporosis, you may take the following actions:  · If you are 19-50 years old, get at least 1,000 mg of calcium and at least 600 mg of vitamin D per day.  · If you are older than age 50 but younger than age 70, get at least 1,200 mg of calcium and at least 600 mg of vitamin D per day.  · If you are older than age 70, get at least 1,200 mg of calcium and at least 800 mg of vitamin D per day.  Smoking and drinking excessive alcohol increase the risk of osteoporosis. Eat foods that are rich in calcium and vitamin D, and do weight-bearing exercises several times each week as directed by your health care provider.  How does menopause affect my mental health?  Depression may occur at any age, but it is more common as you become older. Common symptoms of depression include:  · Low or sad mood.  · Changes in sleep patterns.  · Changes in appetite or eating patterns.  · Feeling an overall lack of motivation or enjoyment of activities that you previously enjoyed.  · Frequent crying spells.  Talk with your health care provider if you think that you are experiencing depression.  General instructions  See your health care provider for regular wellness exams and vaccines. This may include:  · Scheduling regular health, dental, and eye exams.  · Getting and maintaining your vaccines. These include:  ? Influenza vaccine. Get this vaccine each year before the flu season begins.  ? Pneumonia vaccine.  ? Shingles vaccine.  ? Tetanus, diphtheria, and pertussis (Tdap) booster vaccine.  Your health care provider may also recommend other immunizations.  Tell your health care provider if you have ever been abused or do not feel safe at home.  Summary  · Menopause is a normal process in which your ability to get pregnant comes to an end.  · This condition causes hot flashes,  night sweats, decreased interest in sex, mood swings, headaches, or lack of sleep.  · Treatment for this condition may include hormone replacement therapy.  · Take actions to keep yourself healthy, including exercising regularly, eating a healthy diet, watching your weight, and checking your blood pressure and blood sugar levels.  · Get screened for cancer and depression. Make sure that you are up to date with all your vaccines.  This information is not intended to replace advice given to you by your health care provider. Make sure you discuss any questions you have with your health care provider.  Document Revised: 12/11/2019 Document Reviewed: 12/11/2019  [employer] Patient Education © 2021 [employer]      MyPlate from [employer].at    MyPlate is an outline of a general healthy diet based on the 2010 Dietary Guidelines for Americans, from the U[place] Department of Agriculture (USDA). It sets guidelines for how much food you should eat from each food group based on your age, sex, and level of physical activity.  What are tips for following MyPlate?  To follow MyPlate recommendations:  · Eat a wide variety of fruits and vegetables, grains, and protein foods.  · Serve smaller portions and eat less food throughout the day.  · Limit portion sizes to avoid overeating.  · Enjoy your food.  · Get at least 150 minutes of exercise every week. This is about 30 minutes each day, 5 or more days per week.  It can be difficult to have every meal look like MyPlate. Think about MyPlate as eating guidelines for an entire day, rather than each individual meal.  Fruits and vegetables  · Make half of your plate fruits and vegetables.  · Eat many different colors of fruits and vegetables each day.  · For a 2,000 calorie daily food plan, eat:  ? 2½ cups of vegetables every day.  ? 2 cups of fruit every day.  · 1 cup is equal to:  ? 1 cup raw or cooked vegetables.  ? 1 cup raw fruit.  ? 1 medium-sized orange, apple, or banana.  ? 1 cup 100% fruit  or vegetable juice.  ? 2 cups raw leafy greens, such as lettuce, spinach, or kale.  ? ½ cup dried fruit.  Grains  · One fourth of your plate should be grains.  · Make at least half of the grains you eat each day whole grains.  · For a 2,000 calorie daily food plan, eat 6 oz of grains every day.  · 1 oz is equal to:  ? 1 slice bread.  ? 1 cup cereal.  ? ½ cup cooked rice, cereal, or pasta.  Protein  · One fourth of your plate should be protein.  · Eat a wide variety of protein foods, including meat, poultry, fish, eggs, beans, nuts, and tofu.  · For a 2,000 calorie daily food plan, eat 5½ oz of protein every day.  · 1 oz is equal to:  ? 1 oz meat, poultry, or fish.  ? ¼ cup cooked beans.  ? 1 egg.  ? ½ oz nuts or seeds.  ? 1 Tbsp peanut butter.  Dairy  · Drink fat-free or low-fat (1%) milk.  · Eat or drink dairy as a side to meals.  · For a 2,000 calorie daily food plan, eat or drink 3 cups of dairy every day.  · 1 cup is equal to:  ? 1 cup milk, yogurt, cottage cheese, or soy milk (soy beverage).  ? 2 oz processed cheese.  ? 1½ oz natural cheese.  Fats, oils, salt, and sugars  · Only small amounts of oils are recommended.  · Avoid foods that are high in calories and low in nutritional value (empty calories), like foods high in fat or added sugars.  · Choose foods that are low in salt (sodium). Choose foods that have less than 140 milligrams (mg) of sodium per serving.  · Drink water instead of sugary drinks. Drink enough water each day to keep your urine pale yellow.  Where to find support  · Work with your health care provider or a nutrition specialist (dietitian) to develop a customized eating plan that is right for you.  · Download an rubi (mobile application) to help you track your daily food intake.  Where to find more information  · Go to ChooseMyPlate.gov for more information.  Summary  · MyPlate is a general guideline for healthy eating from the USDA. It is based on the 2010 Dietary Guidelines for  Americans.  · In general, fruits and vegetables should take up ½ of your plate, grains should take up ¼ of your plate, and protein should take up ¼ of your plate.  This information is not intended to replace advice given to you by your health care provider. Make sure you discuss any questions you have with your health care provider.  Document Revised: 05/21/2020 Document Reviewed: 03/19/2018  [employer] Patient Education © 2021 [employer]      Calorie Counting for Weight Loss  Calories are units of energy. Your body needs a certain number of calories from food to keep going throughout the day. When you eat or drink more calories than your body needs, your body stores the extra calories mostly as fat. When you eat or drink fewer calories than your body needs, your body burns fat to get the energy it needs.  Calorie counting means keeping track of how many calories you eat and drink each day. Calorie counting can be helpful if you need to lose weight. If you eat fewer calories than your body needs, you should lose weight. Ask your health care provider what a healthy weight is for you.  For calorie counting to work, you will need to eat the right number of calories each day to lose a healthy amount of weight per week. A dietitian can help you figure out how many calories you need in a day and will suggest ways to reach your calorie goal.  · A healthy amount of weight to lose each week is usually 1-2 lb (0.5-0.9 kg). This usually means that your daily calorie intake should be reduced by 500-750 calories.  · Eating 1,200-1,500 calories a day can help most women lose weight.  · Eating 1,500-1,800 calories a day can help most men lose weight.  What do I need to know about calorie counting?  Work with your health care provider or dietitian to determine how many calories you should get each day. To meet your daily calorie goal, you will need to:  · Find out how many calories are in each food that you would like to eat. Try  to do this before you eat.  · Decide how much of the food you plan to eat.  · Keep a food log. Do this by writing down what you ate and how many calories it had.  To successfully lose weight, it is important to balance calorie counting with a healthy lifestyle that includes regular activity.  Where do I find calorie information?    The number of calories in a food can be found on a Nutrition Facts label. If a food does not have a Nutrition Facts label, try to look up the calories online or ask your dietitian for help.  Remember that calories are listed per serving. If you choose to have more than one serving of a food, you will have to multiply the calories per serving by the number of servings you plan to eat. For example, the label on a package of bread might say that a serving size is 1 slice and that there are 90 calories in a serving. If you eat 1 slice, you will have eaten 90 calories. If you eat 2 slices, you will have eaten 180 calories.  How do I keep a food log?  After each time that you eat, record the following in your food log as soon as possible:  · What you ate. Be sure to include toppings, sauces, and other extras on the food.  · How much you ate. This can be measured in cups, ounces, or number of items.  · How many calories were in each food and drink.  · The total number of calories in the food you ate.  Keep your food log near you, such as in a pocket-sized notebook or on an rubi or website on your mobile phone. Some programs will calculate calories for you and show you how many calories you have left to meet your daily goal.  What are some portion-control tips?  · Know how many calories are in a serving. This will help you know how many servings you can have of a certain food.  · Use a measuring cup to measure serving sizes. You could also try weighing out portions on a kitchen scale. With time, you will be able to estimate serving sizes for some foods.  · Take time to put servings of different  foods on your favorite plates or in your favorite bowls and cups so you know what a serving looks like.  · Try not to eat straight from a food's packaging, such as from a bag or box. Eating straight from the package makes it hard to see how much you are eating and can lead to overeating. Put the amount you would like to eat in a cup or on a plate to make sure you are eating the right portion.  · Use smaller plates, glasses, and bowls for smaller portions and to prevent overeating.  · Try not to multitask. For example, avoid watching TV or using your computer while eating. If it is time to eat, sit down at a table and enjoy your food. This will help you recognize when you are full. It will also help you be more mindful of what and how much you are eating.  What are tips for following this plan?  Reading food labels  · Check the calorie count compared with the serving size. The serving size may be smaller than what you are used to eating.  · Check the source of the calories. Try to choose foods that are high in protein, fiber, and vitamins, and low in saturated fat, trans fat, and sodium.  Shopping  · Read nutrition labels while you shop. This will help you make healthy decisions about which foods to buy.  · Pay attention to nutrition labels for low-fat or fat-free foods. These foods sometimes have the same number of calories or more calories than the full-fat versions. They also often have added sugar, starch, or salt to make up for flavor that was removed with the fat.  · Make a grocery list of lower-calorie foods and stick to it.  Cooking  · Try to cook your favorite foods in a healthier way. For example, try baking instead of frying.  · Use low-fat dairy products.  Meal planning  · Use more fruits and vegetables. One-half of your plate should be fruits and vegetables.  · Include lean proteins, such as chicken, turkey, and fish.  Lifestyle  Each week, aim to do one of the following:  · 150 minutes of moderate  exercise, such as walking.  · 75 minutes of vigorous exercise, such as running.  General information  · Know how many calories are in the foods you eat most often. This will help you calculate calorie counts faster.  · Find a way of tracking calories that works for you. Get creative. Try different apps or programs if writing down calories does not work for you.  What foods should I eat?    · Eat nutritious foods. It is better to have a nutritious, high-calorie food, such as an avocado, than a food with few nutrients, such as a bag of potato chips.  · Use your calories on foods and drinks that will fill you up and will not leave you hungry soon after eating.  ? Examples of foods that fill you up are nuts and nut butters, vegetables, lean proteins, and high-fiber foods such as whole grains. High-fiber foods are foods with more than 5 g of fiber per serving.  · Pay attention to calories in drinks. Low-calorie drinks include water and unsweetened drinks.  The items listed above may not be a complete list of foods and beverages you can eat. Contact a dietitian for more information.  What foods should I limit?  Limit foods or drinks that are not good sources of vitamins, minerals, or protein or that are high in unhealthy fats. These include:  · Candy.  · Other sweets.  · Sodas, specialty coffee drinks, alcohol, and juice.  The items listed above may not be a complete list of foods and beverages you should avoid. Contact a dietitian for more information.  How do I count calories when eating out?  · Pay attention to portions. Often, portions are much larger when eating out. Try these tips to keep portions smaller:  ? Consider sharing a meal instead of getting your own.  ? If you get your own meal, eat only half of it. Before you start eating, ask for a container and put half of your meal into it.  ? When available, consider ordering smaller portions from the menu instead of full portions.  · Pay attention to your food and  drink choices. Knowing the way food is cooked and what is included with the meal can help you eat fewer calories.  ? If calories are listed on the menu, choose the lower-calorie options.  ? Choose dishes that include vegetables, fruits, whole grains, low-fat dairy products, and lean proteins.  ? Choose items that are boiled, broiled, grilled, or steamed. Avoid items that are buttered, battered, fried, or served with cream sauce. Items labeled as crispy are usually fried, unless stated otherwise.  ? Choose water, low-fat milk, unsweetened iced tea, or other drinks without added sugar. If you want an alcoholic beverage, choose a lower-calorie option, such as a glass of wine or light beer.  ? Ask for dressings, sauces, and syrups on the side. These are usually high in calories, so you should limit the amount you eat.  ? If you want a salad, choose a garden salad and ask for grilled meats. Avoid extra toppings such as momin, cheese, or fried items. Ask for the dressing on the side, or ask for olive oil and vinegar or lemon to use as dressing.  · Estimate how many servings of a food you are given. Knowing serving sizes will help you be aware of how much food you are eating at restaurants.  Where to find more information  · Centers for Disease Control and Prevention: www.cdc.gov  · U.[place]. Department of Agriculture: myplate.gov  Summary  · Calorie counting means keeping track of how many calories you eat and drink each day. If you eat fewer calories than your body needs, you should lose weight.  · A healthy amount of weight to lose per week is usually 1-2 lb (0.5-0.9 kg). This usually means reducing your daily calorie intake by 500-750 calories.  · The number of calories in a food can be found on a Nutrition Facts label. If a food does not have a Nutrition Facts label, try to look up the calories online or ask your dietitian for help.  · Use smaller plates, glasses, and bowls for smaller portions and to prevent  overeating.  · Use your calories on foods and drinks that will fill you up and not leave you hungry shortly after a meal.  This information is not intended to replace advice given to you by your health care provider. Make sure you discuss any questions you have with your health care provider.  Document Revised: 01/28/2021 Document Reviewed: 01/28/2021  [employer] Patient Education © 2021 [employer]      Exercising to Lose Weight  Exercise is structured, repetitive physical activity to improve fitness and health. Getting regular exercise is important for everyone. It is especially important if you are overweight. Being overweight increases your risk of heart disease, stroke, diabetes, high blood pressure, and several types of cancer. Reducing your calorie intake and exercising can help you lose weight.  Exercise is usually categorized as moderate or vigorous intensity. To lose weight, most people need to do a certain amount of moderate-intensity or vigorous-intensity exercise each week.  Moderate-intensity exercise    Moderate-intensity exercise is any activity that gets you moving enough to burn at least three times more energy (calories) than if you were sitting.  Examples of moderate exercise include:  · Walking a mile in 15 minutes.  · Doing light yard work.  · Biking at an easy pace.  Most people should get at least 150 minutes (2 hours and 30 minutes) a week of moderate-intensity exercise to maintain their body weight.  Vigorous-intensity exercise  Vigorous-intensity exercise is any activity that gets you moving enough to burn at least six times more calories than if you were sitting. When you exercise at this intensity, you should be working hard enough that you are not able to carry on a conversation.  Examples of vigorous exercise include:  · Running.  · Playing a team sport, such as football, basketball, and soccer.  · Jumping rope.  Most people should get at least 75 minutes (1 hour and 15 minutes) a week  of vigorous-intensity exercise to maintain their body weight.  How can exercise affect me?  When you exercise enough to burn more calories than you eat, you lose weight. Exercise also reduces body fat and builds muscle. The more muscle you have, the more calories you burn. Exercise also:  · Improves mood.  · Reduces stress and tension.  · Improves your overall fitness, flexibility, and endurance.  · Increases bone strength.  The amount of exercise you need to lose weight depends on:  · Your age.  · The type of exercise.  · Any health conditions you have.  · Your overall physical ability.  Talk to your health care provider about how much exercise you need and what types of activities are safe for you.  What actions can I take to lose weight?  Nutrition    · Make changes to your diet as told by your health care provider or diet and nutrition specialist (dietitian). This may include:  ? Eating fewer calories.  ? Eating more protein.  ? Eating less unhealthy fats.  ? Eating a diet that includes fresh fruits and vegetables, whole grains, low-fat dairy products, and lean protein.  ? Avoiding foods with added fat, salt, and sugar.  · Drink plenty of water while you exercise to prevent dehydration or heat stroke.    Activity  · Choose an activity that you enjoy and set realistic goals. Your health care provider can help you make an exercise plan that works for you.  · Exercise at a moderate or vigorous intensity most days of the week.  ? The intensity of exercise may vary from person to person. You can tell how intense a workout is for you by paying attention to your breathing and heartbeat. Most people will notice their breathing and heartbeat get faster with more intense exercise.  · Do resistance training twice each week, such as:  ? Push-ups.  ? Sit-ups.  ? Lifting weights.  ? Using resistance bands.  · Getting short amounts of exercise can be just as helpful as long structured periods of exercise. If you have trouble  finding time to exercise, try to include exercise in your daily routine.  ? Get up, stretch, and walk around every 30 minutes throughout the day.  ? Go for a walk during your lunch break.  ? Park your car farther away from your destination.  ? If you take public transportation, get off one stop early and walk the rest of the way.  ? Make phone calls while standing up and walking around.  ? Take the stairs instead of elevators or escalators.  · Wear comfortable clothes and shoes with good support.  · Do not exercise so much that you hurt yourself, feel dizzy, or get very short of breath.  Where to find more information  · [place] Department of Health and Human Services: www.hhs.gov  · Centers for Disease Control and Prevention (CDC): www.cdc.gov  Contact a health care provider:  · Before starting a new exercise program.  · If you have questions or concerns about your weight.  · If you have a medical problem that keeps you from exercising.  Get help right away if you have any of the following while exercising:  · Injury.  · Dizziness.  · Difficulty breathing or shortness of breath that does not go away when you stop exercising.  · Chest pain.  · Rapid heartbeat.  Summary  · Being overweight increases your risk of heart disease, stroke, diabetes, high blood pressure, and several types of cancer.  · Losing weight happens when you burn more calories than you eat.  · Reducing the amount of calories you eat in addition to getting regular moderate or vigorous exercise each week helps you lose weight.  This information is not intended to replace advice given to you by your health care provider. Make sure you discuss any questions you have with your health care provider.  Document Revised: 04/15/2021 Document Reviewed: 04/15/2021  Elsevier Patient Education © 2021 [employer]

## 2021-10-14 NOTE — PROGRESS NOTES
"          Subjective     Chief Complaint:  Physical Exam.    History of Present Illness    History obtained from the patient.    The patient was diagnosed with a right upper lobe Pulmonary Nodule in April 2014, per CT Chest done at [facility].  CT Chest done at [facility] on 7/9/2014 was read as a stable right middle lobe lung nodule.  CT Chest done on 4/19/2017, at  [facility] showed the right middle lobe nodule to be slightly smaller than the previous CT.  On 7/31/2019, CT chest at [facility] showed a right upper lobe 1 cm nodule.  Right middle lobe nodule had decreased in size.  She saw Dr. Mason, who recommended a 3-month follow-up CT.  Due to the COVID-19 Pandemic, this did not get done until 7/20/2020.  CT showed a RUL ground glass nodule, stable x1 year.  She saw Dr. Mason same day, and he recommended a 1 year follow-up.    She saw Dr. Mason on 7/26/2021.  He felt her abnormal CT chest findings were most consistent with chronic histoplasmosis.  He ordered multiple blood test (fungal antibodies, histoplasmosis antigen, and quantiferon TB), which were negative.  Biopsy has not been done because the nodule is \"too close to the heart\" per patient report.  PET scan on 8/10/2021 showed the right upper lobe pulmonary nodular focus or cluster of micronodules to be nonhypermetabolic.  Dr. Mason recommended a 3-month follow-up with CT chest, and she has an appointment  for both on 11/18/2021.  She denies hemoptysis.    The patient reports chronic congestion, clear rhinorrhea, postnasal drainage and a cough productive of clear sputum.  She saw an allergist on 5/5/2021 who started her on Loratadine.  She has just re-started Singulair.  She reports using Flonase alternating with Nasacort 4-5 times per day.  She states nasal lavage also helps.  She states she has a follow-up appointment on 10/15/2021.    The patient is followed by Dr. Ortiz for a history of Left Breast Cancer, last visit 6/8/2021. "  Mammogram on 6/8/2021 was category 2. She denies any breast symptoms.    The patient has Chronic Low Back Pain and Hip Pain.  She saw Orthopedics.  She was diagnosed with Osteoarthritis and a pinched nerve.  She is on Meloxicam and Robaxin as needed.  She also takes Glucosamine/Chondroitin and Turmeric.  PT was recommended, but she has not started that yet.     Primary Care Cardiac [facility]: The patient is here today for a follow-up visit.      Her Hypertension has been stable.   Medication(s): Lisinopril, HCTZ, and Aspirin.   The patient is adherent with her medication regimen.   Medication Side Effects: Cough and dizziness.     Procedures: On 7/16/2019, Cardiac CT scan showed Calcium Score 0.     Interval Events: The patient called on 7/26/2021 stating Dr. Mason stopped her Lisinopril and switch her to Amlodipine due to her cough.  It was recommended she go ahead with this change, but she opted to continue the Lisinopril since [place].  Blood pressure at home has been extremely variable since last visit (blood pressure log reviewed), with a couple elevated readings..  On 4/17/2021, LDL was 139, TG 210.       Symptoms: Lightheaded and dizzy with position changes.  Denies chest pain, dyspnea, MELISSA, orthopnea, PND, palpitations, syncope (but sometimes feels like she is going to pass out), lower extremity edema, and claudication.   Associated Symptoms: No significant weight change since last visit.  No fatigue, headaches, polyuria, polydipsia, arthralgias, myalgias, visual impairment, memory loss, concentration issues, or focal neurological deficit.      Lifestyle and Disease Management: Diet: She consumes a diverse and healthy diet.   Weight Issues: She has weight concerns. Exercise: She  exercises daily (walking and yard work).   Tobacco Use: Former smoker.      GERD Follow-up: The patient is being seen for a routine clinic follow-up of Gastroesophageal Reflux Disease,  which is stable.   Comorbid Illness: Irritable Bowel Syndrome.  Interval Events: None.  Symptoms: Reports intermittent heartburn.  No abdominal pain,  acid regurgitation, nausea, vomiting, hematemesis, dysphagia, odynophagia, hematochezia, melena, early satiety, belching, or bloating.   Associated Symptoms: Has a chronic cough. No chronic sore throat,  hoarseness, or wheezing.   Medications: Omeprazole.  Takes Prozac for IBS.        Colonic Polyp Follow-up: The patient is being seen for a routine clinic follow-up of Colon Polyp(s), which is stable.   Pertinent Medical History: Anal Fissure.  Interval Events:  Current diagnosis was determined by Colonoscopy and last 12/13/17, no polyps.   Symptoms:   No abdominal pain, diarrhea, constipation, hematochezia, melena, decreased stool caliber, or change in bowel habits.    Associated Symptoms: no rectal prolapse.   Medication: Docusate Sodium.    Gaby Ratliff is a 61 y.o. female who presents for an Annual Physical.      PMH, PSH, SocHx, FamHx, Allergies, and Medications: Reviewed and updated.    Outpatient Medications Prior to Visit   Medication Sig Dispense Refill   • acetaminophen (TYLENOL) 500 MG tablet Take 1,000 mg by mouth 2 (Two) Times a Day As Needed for Mild Pain .     • aspirin 81 MG EC tablet Take 81 mg by mouth Every Other Day.     • calcium citrate-vitamin d (CALCITRATE) 315-250 MG-UNIT tablet tablet Take 2 tablets by mouth Daily.     • Docusate Calcium (STOOL SOFTENER PO) 2 capsules Daily.     • FLUoxetine (PROzac) 20 MG capsule TAKE ONE CAPSULE BY MOUTH DAILY 30 capsule 11   • fluticasone (FLONASE) 50 MCG/ACT nasal spray into each nostril daily.     • GARLIC OIL PO Take  by mouth.     • Glucos-Chondroit-Hyaluron-MSM (GLUCOSAMINE CHONDROITIN JOINT PO) 2 tablets Daily.     • hydroCHLOROthiazide (HYDRODIURIL) 25 MG tablet TAKE ONE TABLET BY MOUTH DAILY 30 tablet 1   • Loratadine (CLARITIN) 10 MG capsule Take  by mouth.     • methocarbamol (ROBAXIN) 750 MG  tablet TAKE ONE TABLET BY MOUTH FOUR TIMES A DAY AS NEEDED FOR MUSCLE SPASMS 60 tablet 0   • montelukast (SINGULAIR) 10 MG tablet TAKE ONE TABLET BY MOUTH DAILY 30 tablet 11   • Multiple Vitamins-Minerals (MULTI VITAMIN/MINERALS PO) Take  by mouth daily.     • OMEGA 3-6-9 FATTY ACIDS PO Take  by mouth.     • omeprazole (priLOSEC) 20 MG capsule Take 20 mg by mouth Every Other Day.     • Probiotic Product (ALIGN) 4 MG capsule 1 capsule Daily.     • Triamcinolone Acetonide (NASACORT AQ NA) into the nostril(s) as directed by provider.     • Turmeric 500 MG capsule Take 1 capsule by mouth Daily.     • lisinopril (PRINIVIL,ZESTRIL) 40 MG tablet TAKE 1 TABLET BY MOUTH EVERY DAY 30 tablet 11   • amLODIPine (Norvasc) 10 MG tablet Take 1 tablet by mouth Daily. 30 tablet 3   • meloxicam (MOBIC) 15 MG tablet 1 PO Daily with food. (Patient taking differently: 1 PO Daily as needed  with food.) 90 tablet 1   • zinc gluconate 50 MG tablet Take 50 mg by mouth Every Other Day.       No facility-administered medications prior to visit.       Immunization History   Administered Date(s) Administered   • COVID-19 (PFIZER) 04/04/2021, 04/26/2021   • Flu Vaccine Quad PF >36MO 10/04/2016, 10/07/2017   • FluLaval/Fluarix/Fluzone >6 12/19/2018, 10/14/2019, 09/25/2020   • FluMist 2-49yrs 01/10/2016, 10/07/2017   • Hepatitis A 12/19/2018, 06/28/2019   • Tdap 03/21/2011         Patient Active Problem List   Diagnosis   • History of left breast cancer   • Lung nodule, solitary   • Allergic rhinitis   • Anemia   • Benign colonic polyp   • Gastroesophageal reflux disease   • Hypertension   • Irritable bowel syndrome   • Menopausal state   • Sigmoid diverticulosis   • Internal hemorrhoids       Health Habits:  Dental Exam. up to date  Eye Exam. up to date  Hearing Loss:  No  Exercise: 7 times/week.  Current exercise activities include: walking and yard work  Diet: Healthy  Multivitamin: Yes    Safe Driving:  Yes  Seat Belt:  Yes  Bike Helmet:   N/A  Skin Screening:  Yes  Sunscreen: Yes  SBE / COLE: Yes, occasional  Sexual Activity:  Yes  Birth Control:  Menopause  STD Prevention:  N/A    Last Pap: 2019, normal per patient report (GYN).  Last Mammogram: 2021, category 2.  Last DEXA Scan: 2017, normal.  Last Colonoscopy: 2017, diverticulosis and internal hemorrhoids.  Last PSA: N/A    Social:    Social History     Socioeconomic History   • Marital status:    • Number of children: 2   Tobacco Use   • Smoking status: Former Smoker     Packs/day: 1.00     Years: 10.00     Pack years: 10.00     Types: Cigarettes     Quit date:      Years since quittin.8   • Smokeless tobacco: Never Used   • Tobacco comment: 1/2 ppd x 4-6 years in high school   Vaping Use   • Vaping Use: Never used   Substance and Sexual Activity   • Alcohol use: Yes     Comment: 1-3 glasses of wine daily   • Drug use: No   • Sexual activity: Yes     Partners: Male     Birth control/protection: Post-menopausal         Current Medical Providers:    Eun Mcmanus MD (Internal Medicine / Pediatrics)    The [facility] providers who are involved in the care of this patient are listed above.         Review of Systems   Constitutional: Negative for chills, fatigue, fever and unexpected weight change.        Has night sweats.    HENT: Positive for congestion, postnasal drip and rhinorrhea (clear). Negative for ear pain, hearing loss, nosebleeds, sinus pressure, sinus pain, sneezing, sore throat, tinnitus and voice change.         Reports occasional snoring.   Eyes: Positive for discharge (watery) and itching. Negative for photophobia, pain, redness and visual disturbance.   Respiratory: Positive for cough. Negative for apnea, chest tightness, shortness of breath, wheezing and stridor.         No chest congestion.  No hemoptysis.   Cardiovascular: Negative for chest pain, palpitations and leg swelling.        No orthopnea, MELISSA, or PND.  No claudication or syncope.  "  Gastrointestinal: Negative for abdominal pain, blood in stool, constipation, diarrhea, nausea, rectal pain and vomiting.        Has intermittent heartburn. No melena.  No hematemesis.  No dysphagia or odynophagia.  No early satiety, belching, or bloating.    Endocrine: Positive for heat intolerance (not new). Negative for cold intolerance, polydipsia, polyphagia and polyuria.        No hair loss or dry skin.  Has hot flashes.     Genitourinary: Positive for dyspareunia. Negative for difficulty urinating, dysuria, flank pain, frequency, hematuria, pelvic pain, urgency, vaginal bleeding and vaginal discharge.        No nocturia, incomplete emptying, or incontinence.   Musculoskeletal: Positive for back pain. Negative for arthralgias, gait problem, joint swelling, myalgias, neck pain and neck stiffness.        No joint stiffness.   Skin: Negative for rash.        No new skin lesions or changes in skin lesions. No breast pain or masses.  No nipple discharge or nipple inversion.   Allergic/Immunologic: Positive for environmental allergies.   Neurological: Positive for dizziness and light-headedness. Negative for tremors, syncope, speech difficulty, weakness, numbness and headaches.        No tingling.  No memory loss.  No decreased concentration.   Hematological: Negative for adenopathy. Does not bruise/bleed easily.   Psychiatric/Behavioral: Negative for confusion, self-injury, sleep disturbance and suicidal ideas. The patient is not nervous/anxious.         No depression.           Objective     Vitals:    10/14/21 0952   BP: 110/71   BP Location: Right arm   Pulse: 63   Resp: 20   Temp: 97.3 °F (36.3 °C)   TempSrc: Temporal   Weight: 89.1 kg (196 lb 6 oz)   Height: 163.2 cm (64.25\")       Body mass index is 33.45 kg/m².    Physical Exam  Vitals and nursing note reviewed.   Constitutional:       Appearance: Normal appearance. She is well-developed. She is obese.   HENT:      Head: Normocephalic and atraumatic.      " Right Ear: Tympanic membrane, ear canal and external ear normal.      Left Ear: Tympanic membrane, ear canal and external ear normal.      Mouth/Throat:      Mouth: Mucous membranes are moist. No oral lesions.      Pharynx: Oropharynx is clear.      Comments: Tonsils normal.  Eyes:      Extraocular Movements: Extraocular movements intact.      Conjunctiva/sclera: Conjunctivae normal.      Pupils: Pupils are equal, round, and reactive to light.      Comments: Fundi normal bilaterally.   Neck:      Thyroid: No thyromegaly.      Vascular: No carotid bruit.   Cardiovascular:      Rate and Rhythm: Normal rate and regular rhythm.      Pulses: Normal pulses.      Heart sounds: Normal heart sounds. No murmur heard.  No friction rub. No gallop.    Pulmonary:      Effort: Pulmonary effort is normal.      Breath sounds: Normal breath sounds.      Comments: Patient declined a breast exam today.  Chest:   Breasts:      Right: No supraclavicular adenopathy.      Left: No supraclavicular adenopathy.       Abdominal:      General: Bowel sounds are normal. There is no distension or abdominal bruit.      Palpations: Abdomen is soft. There is no hepatomegaly, splenomegaly or mass.      Tenderness: There is no abdominal tenderness.   Genitourinary:     Comments:  and rectal exam deferred.  Musculoskeletal:         General: Normal range of motion.      Cervical back: Normal range of motion and neck supple.      Right lower leg: No edema.      Left lower leg: No edema.   Lymphadenopathy:      Cervical: No cervical adenopathy.      Upper Body:      Right upper body: No supraclavicular adenopathy.      Left upper body: No supraclavicular adenopathy.   Skin:     Findings: No rash.      Comments: No atypical skin lesions.   Neurological:      Mental Status: She is alert.      Cranial Nerves: Cranial nerves are intact.      Motor: Motor function is intact. No abnormal muscle tone.      Coordination: Coordination is intact.      Gait: Gait  is intact.      Deep Tendon Reflexes: Reflexes are normal and symmetric.   Psychiatric:         Mood and Affect: Mood normal.         PHQ-2 Depression Screening  Little interest or pleasure in doing things? 0   Feeling down, depressed, or hopeless? 0   PHQ-2 Total Score 0         Counseling was given to patient for the following topics:  appropriate exercise, healthy eating habits, disease prevention, risk factors for cancer, importance of self breast exam and breast health, importance of immunizations, including risks and benefits, bone health, sun safety, seatbelt use and safe driving. Also discussed the importance of regular dental and vision care, as well recommendation for a yearly screening skin exam after age 40.  Written information provided to patient on these topics and other health maintenance issues.      Assessment/Plan       Diagnoses and all orders for this visit:    1. Encounter for health maintenance examination in adult (Primary)  -     POC Urinalysis Dipstick, Automated  -     Lipid Panel; Future  -     Comprehensive Metabolic Panel; Future  -     TSH; Future  -     CBC & Differential; Future  -     Magnesium; Future    2. Primary hypertension  -     POC Urinalysis Dipstick, Automated  -     Lipid Panel; Future  -     Comprehensive Metabolic Panel; Future  -     TSH; Future  -     CBC & Differential; Future   Lisinopril discontinued.   Amlodipine started.   HCTZ continued.    3. Gastroesophageal reflux disease, unspecified whether esophagitis present   Continue current medication(s) as noted in the history of present illness.    4. Benign colonic polyp   Colonoscopy up-to-date.    5. Lung nodule, solitary   Follow up per Pulmonary.    6. High risk medication use  -     Magnesium; Future    7. Influenza vaccine needed  -     FluLaval/Fluarix/Fluzone >6 Months    8. Need for Tdap vaccination  -     Tdap Vaccine Greater Than or Equal To 8yo IM        Patient's Body mass index is 33.45 kg/m².  indicating that she is obese (BMI >30). Obesity-related health conditions include the following: hypertension, GERD and osteoarthritis. Obesity is unchanged. BMI is is above average; BMI management plan is completed. We discussed portion control and increasing exercise..    Recommended Shingrix (new Shingles vaccine) and Covid 19 booster vaccination at the pharmacy.    Recommended PT for the patient's back and hip pain.  She states she is leaving for [place] on 10/18/2021 and will find a place to do PT there.    Return in about 6 months (around 4/14/2022) for Recheck HTN, fasting.

## 2021-11-08 DIAGNOSIS — I10 ESSENTIAL HYPERTENSION: ICD-10-CM

## 2021-11-08 RX ORDER — AMLODIPINE BESYLATE 10 MG/1
TABLET ORAL
Qty: 30 TABLET | Refills: 3 | Status: SHIPPED | OUTPATIENT
Start: 2021-11-08 | End: 2022-04-15 | Stop reason: SDUPTHER

## 2021-11-08 RX ORDER — HYDROCHLOROTHIAZIDE 25 MG/1
TABLET ORAL
Qty: 30 TABLET | Refills: 3 | Status: SHIPPED | OUTPATIENT
Start: 2021-11-08 | End: 2022-04-12

## 2021-11-18 ENCOUNTER — OFFICE VISIT (OUTPATIENT)
Dept: PULMONOLOGY | Facility: CLINIC | Age: 61
End: 2021-11-18

## 2021-11-18 ENCOUNTER — HOSPITAL ENCOUNTER (OUTPATIENT)
Dept: CT IMAGING | Facility: HOSPITAL | Age: 61
Discharge: HOME | End: 2021-11-18
Admitting: INTERNAL MEDICINE

## 2021-11-18 VITALS
BODY MASS INDEX: 34.83 KG/M2 | WEIGHT: 204 LBS | TEMPERATURE: 97.8 F | DIASTOLIC BLOOD PRESSURE: 80 MMHG | OXYGEN SATURATION: 98 % | HEART RATE: 67 BPM | RESPIRATION RATE: 16 BRPM | SYSTOLIC BLOOD PRESSURE: 122 MMHG | HEIGHT: 64 IN

## 2021-11-18 DIAGNOSIS — R91.1 LUNG NODULE: ICD-10-CM

## 2021-11-18 DIAGNOSIS — R91.1 LUNG NODULE: Primary | ICD-10-CM

## 2021-11-18 PROCEDURE — 99214 OFFICE O/P EST MOD 30 MIN: CPT | Performed by: INTERNAL MEDICINE

## 2021-11-18 PROCEDURE — 71250 CT THORAX DX C-: CPT

## 2021-11-18 NOTE — PROGRESS NOTES
CC:    Abnormal CT    HPI:    58 y/o WF w/ h/o minimal smoking in high school and college, allergic rhinitis, HTN on ACE, GERD, left sided breast cancer s/p lumpectomy 2011 (did not need chemo or RT), who presents today for evaluation of lung nodule.  Patient had prior imaging showing lung nodules consistent with granulomatous disease / histo.  She recently had a repeat CT for follow up ordered by her PCP.  There was a new GG nodule and she was sent for evaluation.  No symptoms attributable to this nodule.  She does have ongoing allergic rhinitis.  Seasonal allergies tend to be worse in spring and fall.  Also tends to get prolonged cough / bronchitis after getting URI's or allergies.  No wheezing or SOA.    Patient enjoys gardening and working outside as a hobby.  Has a Al pond.      INTERVAL HISTORY:    Patient returns today for follow up.  Still having some issues with allergic rhinitis and post nasal gtt.  Still with chronic cough.  Does have some GERD as well.  Last visit we switched ACEI to norvasc.  She has also tried Breo which didn't seem to help much.  Has upcoming apt with ENT next week.    PMH:    Past Medical History:   Diagnosis Date   • Allergic rhinitis    • Anemia    • Benign colonic polyp     Description: dx 4/11   • Elevated C-reactive protein (CRP)     dx 5/12 (normal homocysteine level).  3/14- normal coronary calcium score 1.0   • Gastroesophageal reflux disease     Description: dx 2010- Dr Lozano   • History of echocardiogram 07/31/2019    normal (no aortic dilation). EF 60%   • History of esophagogastroduodenoscopy 04/2011    reflux esophagitis, gastritis, and gastric polyp   • History of left breast cancer     Description: Left DCIS dx 3/11   • History of varicella    • Hypertension     Description: dx 8/14.  Calcium Score 1.0 (3/14).   • Internal hemorrhoids     Dx by Colonoscopy 12/17   • Irritable bowel syndrome     Description: dx approx 1979- Dr. Lozano   • Lung nodule, solitary     Dx  - RUL   • Menopausal state     .   • Screening for cardiovascular condition 2019    Coronary Calcium Score 0   • Sigmoid diverticulosis     Dx by Colonoscopy      PSH:    Past Surgical History:   Procedure Laterality Date   • APPENDECTOMY      approx   • BREAST LUMPECTOMY Left 2011    DCIS   •  SECTION       and      FH:    Family History   Problem Relation Age of Onset   • Coronary artery disease Mother         50's   • Diabetes Mother    • Hyperlipidemia Mother    • Hypertension Mother    • Coronary artery disease Father         30's   • Bipolar disorder Sister    • Diabetes Sister    • Hypertension Sister    • Diabetes Sister    • Hypertension Sister      SH:    Social History     Socioeconomic History   • Marital status:    • Number of children: 2   Tobacco Use   • Smoking status: Former Smoker     Packs/day: 1.00     Years: 10.00     Pack years: 10.00     Types: Cigarettes     Quit date:      Years since quittin.9   • Smokeless tobacco: Never Used   • Tobacco comment:  ppd x 4-6 years in high school   Vaping Use   • Vaping Use: Never used   Substance and Sexual Activity   • Alcohol use: Yes     Comment: 1-3 glasses of wine daily   • Drug use: No   • Sexual activity: Yes     Partners: Male     Birth control/protection: Post-menopausal     ALLERGIES:    Allergies   Allergen Reactions   • Penicillins Rash   • Tamoxifen Other (See Comments)     Feeling hateful and mean    • Bupropion GI Intolerance   • Lisinopril Cough   • Levofloxacin Unknown - Low Severity   • Valsartan Headache     MEDICATIONS:      Current Outpatient Medications:   •  acetaminophen (TYLENOL) 500 MG tablet, Take 1,000 mg by mouth 2 (Two) Times a Day As Needed for Mild Pain ., Disp: , Rfl:   •  amLODIPine (NORVASC) 10 MG tablet, TAKE ONE TABLET BY MOUTH DAILY, Disp: 30 tablet, Rfl: 3  •  aspirin 81 MG EC tablet, Take 81 mg by mouth Every Other Day., Disp: , Rfl:   •  calcium  citrate-vitamin d (CALCITRATE) 315-250 MG-UNIT tablet tablet, Take 2 tablets by mouth Daily., Disp: , Rfl:   •  Docusate Calcium (STOOL SOFTENER PO), 2 capsules Daily., Disp: , Rfl:   •  FLUoxetine (PROzac) 20 MG capsule, TAKE ONE CAPSULE BY MOUTH DAILY, Disp: 30 capsule, Rfl: 11  •  fluticasone (FLONASE) 50 MCG/ACT nasal spray, into each nostril daily., Disp: , Rfl:   •  GARLIC OIL PO, Take  by mouth., Disp: , Rfl:   •  Glucos-Chondroit-Hyaluron-MSM (GLUCOSAMINE CHONDROITIN JOINT PO), 2 tablets Daily., Disp: , Rfl:   •  hydroCHLOROthiazide (HYDRODIURIL) 25 MG tablet, TAKE ONE TABLET BY MOUTH DAILY, Disp: 30 tablet, Rfl: 3  •  Loratadine (CLARITIN) 10 MG capsule, Take  by mouth., Disp: , Rfl:   •  meloxicam (MOBIC) 15 MG tablet, 1 PO Daily with food. (Patient taking differently: 1 PO Daily as needed  with food.), Disp: 90 tablet, Rfl: 1  •  methocarbamol (ROBAXIN) 750 MG tablet, TAKE ONE TABLET BY MOUTH FOUR TIMES A DAY AS NEEDED FOR MUSCLE SPASMS, Disp: 60 tablet, Rfl: 0  •  montelukast (SINGULAIR) 10 MG tablet, TAKE ONE TABLET BY MOUTH DAILY, Disp: 30 tablet, Rfl: 11  •  Multiple Vitamins-Minerals (MULTI VITAMIN/MINERALS PO), Take  by mouth daily., Disp: , Rfl:   •  OMEGA 3-6-9 FATTY ACIDS PO, Take  by mouth., Disp: , Rfl:   •  omeprazole (priLOSEC) 20 MG capsule, Take 20 mg by mouth Every Other Day., Disp: , Rfl:   •  Probiotic Product (ALIGN) 4 MG capsule, 1 capsule Daily., Disp: , Rfl:   •  Triamcinolone Acetonide (NASACORT AQ NA), into the nostril(s) as directed by provider., Disp: , Rfl:   •  Turmeric 500 MG capsule, Take 1 capsule by mouth Daily., Disp: , Rfl:   ROS:  Per HPI, otherwise all systems reviewed and negative.    DIAGNOSTIC DATA (Reviewed and interpreted by me unless otherwise specified):    CT Chest 7/31/19 & 4/10/14 - scattered calcified granulomas, calcified mediastinal LN's, calcifications in spleen consistent with histo.  All of these areas stable.  Scarring in RML improving from 2014.  There  are some new mild tree-in-bud opacities in RLL.  Very subtle GGO in RUL that is new.    CT Chest 7/15/20 - stable RUL GGN.    CT Chest 7/15/21 - stable RUL GGN, overall findings are stable from 7/2020 except for a new 2.3x1.3 cm nodule in the RML medial segment.  No mediastinal / hilar adenopathy.    CT PET 8/10/21 - nodule of concern in RML is PET negative w/ SUV 0.7    CT Chest 11/18/21 - all nodules stable including RML nodule which was first noted July 2021    PFT 9/17/19 - borderline mild obstruction, no change w/ BD, no restriction, borderline air trapping, mild dec DLCO corrects for alveolar volume    PFT 7/26/21 - mild obstruction, +change w/ BD, no restriction, +air trapping, normal DLCO    Vitals:    11/18/21 1459   BP: 122/80   Pulse: 67   Resp: 16   Temp: 97.8 °F (36.6 °C)   SpO2: 98%       Physical Exam   Constitutional: Oriented to person, place, and time. Appears well-developed and well-nourished.   Head: Normocephalic and atraumatic.   Nose: Nose normal.   Mouth/Throat: Oropharynx with severe cobblestoning.  Eyes: Conjunctivae are normal.  Pupils normal.  Neck: No tracheal deviation present.   Cardiovascular: Normal rate, regular rhythm, normal heart sounds and intact distal pulses.  Exam reveals no gallop and no friction rub.  No thrill.  No JVD.  No edema.  No murmur heard.  Pulmonary/Chest: Effort normal and breath sounds normal.  No tenderness to palpation.  No clubbing.   Abdominal: Soft. Bowel sounds are normal. No distension. No tenderness. There is no guarding.   Musculoskeletal: Normal range of motion.  No tenderness.  Lymphadenopathy:  No cervical adenopathy.   Neurological:  No new focal neurological deficits observed   Skin: Skin is warm and dry. No rash noted.   Psychiatric: Normal mood and affect.  Behavior is normal. Judgment normal.    Assessment/Plan      Abnormal CT Chest    All nodules stable.  Most concerning one in RML is PET negative and stable over 3-4 month period.  Would like  to repeat CT in 6 months, then one year after that.  Quantiferon, and Fungal serologies / antigens negative.    Allergic Rhinitis   Chronic Cough   Suspected Asthma (Mild Obstruction, Air Trapping, +BD Response on PFT)        Currently on flonase, antihistamine.  Off singulair.  Using azelastine intermittently.  Better with sinus irrigation (navage).  Tends to be seasonal or associate with URI. Has had persistent mild elevation in AEC and mild to borderline obstruction on PFTs.  Suspect cough variant asthma.  Breo seemed to make things worse or at least not help.  No overt wheezing.  ACEI subbed for CCB.  Reflux potentially playing a role and she is on therapy.  She sees ENT later next week.  Unless Dr. Grubbs finds something compelling, I would like to give her a trial of nebulized pulmicort and brovana to see if this helps the cough (she was given written prescriptions for these).  Even though Breo didn't work, there may be an issue with drug delivery or inhaler technique.      If still not better after all that, I can consider simultaneous Bronch / EGD w/ GI.    RTC ~6 months w/ repeat CT Chest w/o contrast and Full PFT    Clarke Mason MD  Pulmonology and Critical Care Medicine  11/18/21 15:42 EST  Electronically Signed    C.C.:  No ref. provider found, Eun Mcmanus MD

## 2021-12-13 DIAGNOSIS — K58.9 IRRITABLE BOWEL SYNDROME WITHOUT DIARRHEA: ICD-10-CM

## 2021-12-13 RX ORDER — FLUOXETINE HYDROCHLORIDE 20 MG/1
CAPSULE ORAL
Qty: 30 CAPSULE | Refills: 3 | Status: SHIPPED | OUTPATIENT
Start: 2021-12-13 | End: 2022-06-07 | Stop reason: SDUPTHER

## 2022-04-09 DIAGNOSIS — I10 ESSENTIAL HYPERTENSION: ICD-10-CM

## 2022-04-12 RX ORDER — HYDROCHLOROTHIAZIDE 25 MG/1
TABLET ORAL
Qty: 30 TABLET | Refills: 3 | Status: SHIPPED | OUTPATIENT
Start: 2022-04-12 | End: 2022-06-07 | Stop reason: SDUPTHER

## 2022-04-15 DIAGNOSIS — I10 ESSENTIAL HYPERTENSION: Primary | ICD-10-CM

## 2022-04-15 RX ORDER — AMLODIPINE BESYLATE 10 MG/1
10 TABLET ORAL DAILY
Qty: 30 TABLET | Refills: 3 | Status: SHIPPED | OUTPATIENT
Start: 2022-04-15 | End: 2022-06-07 | Stop reason: SDUPTHER

## 2022-05-19 ENCOUNTER — HOSPITAL ENCOUNTER (OUTPATIENT)
Dept: CT IMAGING | Facility: HOSPITAL | Age: 62
Discharge: HOME | End: 2022-05-19
Admitting: INTERNAL MEDICINE

## 2022-05-19 DIAGNOSIS — R91.1 LUNG NODULE: ICD-10-CM

## 2022-05-19 PROCEDURE — 71250 CT THORAX DX C-: CPT

## 2022-05-23 DIAGNOSIS — Z01.812 BLOOD TESTS PRIOR TO TREATMENT OR PROCEDURE: Primary | ICD-10-CM

## 2022-05-24 ENCOUNTER — CLINICAL SUPPORT NO REQUIREMENTS (OUTPATIENT)
Dept: PULMONOLOGY | Facility: CLINIC | Age: 62
End: 2022-05-24

## 2022-05-24 DIAGNOSIS — Z01.812 BLOOD TESTS PRIOR TO TREATMENT OR PROCEDURE: ICD-10-CM

## 2022-05-24 PROCEDURE — U0004 COV-19 TEST NON-CDC HGH THRU: HCPCS | Performed by: INTERNAL MEDICINE

## 2022-05-24 PROCEDURE — 99211 OFF/OP EST MAY X REQ PHY/QHP: CPT | Performed by: INTERNAL MEDICINE

## 2022-05-25 LAB — SARS-COV-2 RNA NOSE QL NAA+PROBE: NOT DETECTED

## 2022-05-26 ENCOUNTER — OFFICE VISIT (OUTPATIENT)
Dept: PULMONOLOGY | Facility: CLINIC | Age: 62
End: 2022-05-26

## 2022-05-26 VITALS
WEIGHT: 206 LBS | HEIGHT: 64 IN | TEMPERATURE: 98.7 F | SYSTOLIC BLOOD PRESSURE: 136 MMHG | OXYGEN SATURATION: 99 % | HEART RATE: 66 BPM | BODY MASS INDEX: 35.17 KG/M2 | DIASTOLIC BLOOD PRESSURE: 74 MMHG | RESPIRATION RATE: 14 BRPM

## 2022-05-26 DIAGNOSIS — R91.1 LUNG NODULE, SOLITARY: Primary | ICD-10-CM

## 2022-05-26 PROCEDURE — 94729 DIFFUSING CAPACITY: CPT | Performed by: INTERNAL MEDICINE

## 2022-05-26 PROCEDURE — 99213 OFFICE O/P EST LOW 20 MIN: CPT | Performed by: INTERNAL MEDICINE

## 2022-05-26 PROCEDURE — 94726 PLETHYSMOGRAPHY LUNG VOLUMES: CPT | Performed by: INTERNAL MEDICINE

## 2022-05-26 PROCEDURE — 94060 EVALUATION OF WHEEZING: CPT | Performed by: INTERNAL MEDICINE

## 2022-05-26 RX ORDER — IPRATROPIUM BROMIDE 21 UG/1
SPRAY, METERED NASAL EVERY 12 HOURS SCHEDULED
COMMUNITY
End: 2022-06-18 | Stop reason: ALTCHOICE

## 2022-05-26 RX ORDER — ALBUTEROL SULFATE 90 UG/1
4 AEROSOL, METERED RESPIRATORY (INHALATION) ONCE
Status: DISCONTINUED | OUTPATIENT
Start: 2022-05-26 | End: 2022-05-26

## 2022-05-26 RX ADMIN — ALBUTEROL SULFATE 4 PUFF: 90 AEROSOL, METERED RESPIRATORY (INHALATION) at 12:19

## 2022-05-26 NOTE — PROGRESS NOTES
CC:    Abnormal CT    HPI:    60 y/o WF w/ h/o minimal smoking in high school and college, allergic rhinitis, HTN on ACE, GERD, left sided breast cancer s/p lumpectomy 2011 (did not need chemo or RT), who presents today for evaluation of lung nodule.  Patient had prior imaging showing lung nodules consistent with granulomatous disease / histo.  She recently had a repeat CT for follow up ordered by her PCP.  There was a new GG nodule and she was sent for evaluation.  No symptoms attributable to this nodule.  She does have ongoing allergic rhinitis.  Seasonal allergies tend to be worse in spring and fall.  Also tends to get prolonged cough / bronchitis after getting URI's or allergies.  No wheezing or SOA.    Patient enjoys gardening and working outside as a hobby.  Has a [place] pond.      Patient has had issues with persistent intermittent dry cough.  On prior visit ACE inhibitor was switched to Norvasc.  She tried Breo, which did not seem to help much.  She saw ENT and she was felt to have a component of vasomotor rhinitis and she was given a trial of Atrovent.  This did not seem to help much either.    INTERVAL HISTORY:    Patient returns today for follow up.  Cough has essentially resolved.  She is here to discuss follow-up CT scan.  PMH:    Past Medical History:   Diagnosis Date   • Allergic rhinitis    • Anemia    • Benign colonic polyp     Description: dx 4/11   • Elevated C-reactive protein (CRP)     dx 5/12 (normal homocysteine level).  3/14- normal coronary calcium score 1.0   • Gastroesophageal reflux disease     Description: dx 2010- Dr Lozano   • History of echocardiogram 07/31/2019    normal (no aortic dilation). EF 60%   • History of esophagogastroduodenoscopy 04/2011    reflux esophagitis, gastritis, and gastric polyp   • History of left breast cancer     Description: Left DCIS dx 3/11   • History of varicella    • Hypertension     Description: dx 8/14.  Calcium Score 1.0 (3/14).   • Internal hemorrhoids      Dx by Colonoscopy    • Irritable bowel syndrome     Description: dx approx - Dr. Lozano   • Lung nodule, solitary     Dx - RUL   • Menopausal state     .   • Screening for cardiovascular condition 2019    Coronary Calcium Score 0   • Sigmoid diverticulosis     Dx by Colonoscopy      PSH:    Past Surgical History:   Procedure Laterality Date   • APPENDECTOMY      approx   • BREAST LUMPECTOMY Left 2011    DCIS   •  SECTION       and      FH:    Family History   Problem Relation Age of Onset   • Coronary artery disease Mother         50's   • Diabetes Mother    • Hyperlipidemia Mother    • Hypertension Mother    • Coronary artery disease Father         30's   • Bipolar disorder Sister    • Diabetes Sister    • Hypertension Sister    • Diabetes Sister    • Hypertension Sister      SH:    Social History     Socioeconomic History   • Marital status:    • Number of children: 2   Tobacco Use   • Smoking status: Former Smoker     Packs/day: 1.00     Years: 10.00     Pack years: 10.00     Types: Cigarettes     Quit date:      Years since quittin.4   • Smokeless tobacco: Never Used   • Tobacco comment: 1/2 ppd x 4-6 years in high school   Vaping Use   • Vaping Use: Never used   Substance and Sexual Activity   • Alcohol use: Yes     Comment: 1-3 glasses of wine daily   • Drug use: No   • Sexual activity: Yes     Partners: Male     Birth control/protection: Post-menopausal     ALLERGIES:    Allergies   Allergen Reactions   • Penicillins Rash   • Tamoxifen Other (See Comments)     Feeling hateful and mean    • Bupropion GI Intolerance   • Lisinopril Cough   • Levofloxacin Unknown - Low Severity   • Valsartan Headache     MEDICATIONS:      Current Outpatient Medications:   •  acetaminophen (TYLENOL) 500 MG tablet, Take 1,000 mg by mouth 2 (Two) Times a Day As Needed for Mild Pain ., Disp: , Rfl:   •  amLODIPine (NORVASC) 10 MG tablet, Take 1 tablet by mouth  Daily., Disp: 30 tablet, Rfl: 3  •  aspirin 81 MG EC tablet, Take 81 mg by mouth Every Other Day., Disp: , Rfl:   •  calcium citrate-vitamin d (CALCITRATE) 315-250 MG-UNIT tablet tablet, Take 2 tablets by mouth Daily., Disp: , Rfl:   •  Docusate Calcium (STOOL SOFTENER PO), 2 capsules Daily., Disp: , Rfl:   •  FLUoxetine (PROzac) 20 MG capsule, TAKE ONE CAPSULE BY MOUTH DAILY, Disp: 30 capsule, Rfl: 3  •  fluticasone (FLONASE) 50 MCG/ACT nasal spray, into each nostril daily., Disp: , Rfl:   •  GARLIC OIL PO, Take  by mouth., Disp: , Rfl:   •  Glucos-Chondroit-Hyaluron-MSM (GLUCOSAMINE CHONDROITIN JOINT PO), 2 tablets Daily., Disp: , Rfl:   •  hydroCHLOROthiazide (HYDRODIURIL) 25 MG tablet, TAKE ONE TABLET BY MOUTH DAILY, Disp: 30 tablet, Rfl: 3  •  Loratadine (CLARITIN) 10 MG capsule, Take  by mouth., Disp: , Rfl:   •  meloxicam (MOBIC) 15 MG tablet, 1 PO Daily with food. (Patient taking differently: 1 PO Daily as needed  with food.), Disp: 90 tablet, Rfl: 1  •  methocarbamol (ROBAXIN) 750 MG tablet, TAKE ONE TABLET BY MOUTH FOUR TIMES A DAY AS NEEDED FOR MUSCLE SPASMS, Disp: 60 tablet, Rfl: 0  •  montelukast (SINGULAIR) 10 MG tablet, TAKE ONE TABLET BY MOUTH DAILY, Disp: 30 tablet, Rfl: 11  •  Multiple Vitamins-Minerals (MULTI VITAMIN/MINERALS PO), Take  by mouth daily., Disp: , Rfl:   •  OMEGA 3-6-9 FATTY ACIDS PO, Take  by mouth., Disp: , Rfl:   •  omeprazole (priLOSEC) 20 MG capsule, Take 20 mg by mouth Every Other Day., Disp: , Rfl:   •  Probiotic Product (ALIGN) 4 MG capsule, 1 capsule Daily., Disp: , Rfl:   •  Triamcinolone Acetonide (NASACORT AQ NA), into the nostril(s) as directed by provider., Disp: , Rfl:   •  Turmeric 500 MG capsule, Take 1 capsule by mouth Daily., Disp: , Rfl:   No current facility-administered medications for this visit.  ROS:  Per HPI, otherwise all systems reviewed and negative.    DIAGNOSTIC DATA (Reviewed and interpreted by me unless otherwise specified):    CT Chest 7/31/19 &  4/10/14 - scattered calcified granulomas, calcified mediastinal LN's, calcifications in spleen consistent with histo.  All of these areas stable.  Scarring in RML improving from 2014.  There are some new mild tree-in-bud opacities in RLL.  Very subtle GGO in RUL that is new.    CT Chest 7/15/20 - stable RUL GGN.    CT Chest 7/15/21 - stable RUL GGN, overall findings are stable from 7/2020 except for a new 2.3x1.3 cm nodule in the RML medial segment.  No mediastinal / hilar adenopathy.    CT PET 8/10/21 - nodule of concern in RML is PET negative w/ SUV 0.7    CT Chest 11/18/21 - all nodules stable including RML nodule which was first noted July 2021    CT chest 5/19/2022, stable going all the way back to July 2021 including the right middle lobe nodule which is PET negative from August 2021.  Still has a right upper lobe groundglass nodule which was new in July 2019.  This will need to be followed for at least 5 years.    PFT 9/17/19 - borderline mild obstruction, no change w/ BD, no restriction, borderline air trapping, mild dec DLCO corrects for alveolar volume    PFT 7/26/21 - mild obstruction, +change w/ BD, no restriction, +air trapping, normal DLCO    There were no vitals filed for this visit.     Physical Exam:    Constitutional: Alert, no Distress  Mouth/Throat: Oropharynx is clear and moist.   Cardiovascular: Normal rate, regular rhythm, normal heart sounds.   Pulmonary/Chest: Effort normal and breath sounds normal.  No clubbing.       Assessment & Plan      Abnormal CT Chest  All nodules stable.  Most concerning one in RML is PET negative and stable from July 2021 to May 2022.  Quantiferon, and Fungal serologies / antigens negative.  Groundglass nodule was new in 2019 will need to be followed for 5 years.  At this point I think we can check annually unless she develops new or worsening symptoms.    Allergic Rhinitis   Chronic Cough   Suspected Asthma (Mild Obstruction, Air Trapping, +BD Response on  PFT)  Currently on flonase, antihistamine.  Off singulair.  Using azelastine intermittently.  Better with sinus irrigation (navage).  Also tried Atrovent and Breo which did not seem to help much. Has had persistent mild elevation in AEC and mild to borderline obstruction on PFTs.  Suspect cough variant asthma.  No overt wheezing.  ACEI subbed for CCB.  Reflux potentially playing a role and she is on therapy.  Cough has resolved, perhaps this was just a delayed response to stopping ACE inhibitor.  She feels like sinus irrigation helps her the most.  Not having any shortness of breath or overt wheezing stated above.  Can use Symbicort as needed for wheezing, cough, and shortness of breath.  Patient was given a written prescription.    RTC 1 year with CT chest without contrast and PFT    Clarke Mason MD  Pulmonology and Critical Care Medicine  05/26/22 13:07 EDT  Electronically Signed    C.C.:  No ref. provider found, Eun Mcmanus MD

## 2022-06-07 ENCOUNTER — OFFICE VISIT (OUTPATIENT)
Dept: INTERNAL MEDICINE | Facility: CLINIC | Age: 62
End: 2022-06-07

## 2022-06-07 VITALS
HEART RATE: 76 BPM | RESPIRATION RATE: 20 BRPM | BODY MASS INDEX: 34.58 KG/M2 | SYSTOLIC BLOOD PRESSURE: 132 MMHG | DIASTOLIC BLOOD PRESSURE: 82 MMHG | WEIGHT: 203.06 LBS | TEMPERATURE: 97.5 F

## 2022-06-07 DIAGNOSIS — K63.5 BENIGN COLONIC POLYP: ICD-10-CM

## 2022-06-07 DIAGNOSIS — Z23 NEED FOR COVID-19 VACCINE: ICD-10-CM

## 2022-06-07 DIAGNOSIS — R82.998 LEUKOCYTES IN URINE: ICD-10-CM

## 2022-06-07 DIAGNOSIS — K58.9 IRRITABLE BOWEL SYNDROME WITHOUT DIARRHEA: ICD-10-CM

## 2022-06-07 DIAGNOSIS — R91.1 LUNG NODULE, SOLITARY: ICD-10-CM

## 2022-06-07 DIAGNOSIS — M47.816 LUMBAR SPONDYLOSIS: ICD-10-CM

## 2022-06-07 DIAGNOSIS — I10 PRIMARY HYPERTENSION: Primary | ICD-10-CM

## 2022-06-07 DIAGNOSIS — K21.9 GASTROESOPHAGEAL REFLUX DISEASE, UNSPECIFIED WHETHER ESOPHAGITIS PRESENT: ICD-10-CM

## 2022-06-07 LAB
ALBUMIN SERPL-MCNC: 4.7 G/DL (ref 3.5–5.2)
ALBUMIN/GLOB SERPL: 1.7 G/DL
ALP SERPL-CCNC: 67 U/L (ref 39–117)
ALT SERPL W P-5'-P-CCNC: 24 U/L (ref 1–33)
ANION GAP SERPL CALCULATED.3IONS-SCNC: 14.2 MMOL/L (ref 5–15)
AST SERPL-CCNC: 22 U/L (ref 1–32)
BASOPHILS # BLD AUTO: 0.07 10*3/MM3 (ref 0–0.2)
BASOPHILS NFR BLD AUTO: 1.3 % (ref 0–1.5)
BILIRUB BLD-MCNC: NEGATIVE MG/DL
BILIRUB SERPL-MCNC: 0.3 MG/DL (ref 0–1.2)
BUN SERPL-MCNC: 20 MG/DL (ref 8–23)
BUN/CREAT SERPL: 24.7 (ref 7–25)
CALCIUM SPEC-SCNC: 9.7 MG/DL (ref 8.6–10.5)
CHLORIDE SERPL-SCNC: 101 MMOL/L (ref 98–107)
CHOLEST SERPL-MCNC: 244 MG/DL (ref 0–200)
CLARITY, POC: ABNORMAL
CO2 SERPL-SCNC: 23.8 MMOL/L (ref 22–29)
COLOR UR: YELLOW
CREAT SERPL-MCNC: 0.81 MG/DL (ref 0.57–1)
DEPRECATED RDW RBC AUTO: 44.5 FL (ref 37–54)
EGFRCR SERPLBLD CKD-EPI 2021: 83.2 ML/MIN/1.73
EOSINOPHIL # BLD AUTO: 0.19 10*3/MM3 (ref 0–0.4)
EOSINOPHIL NFR BLD AUTO: 3.4 % (ref 0.3–6.2)
ERYTHROCYTE [DISTWIDTH] IN BLOOD BY AUTOMATED COUNT: 13.2 % (ref 12.3–15.4)
EXPIRATION DATE: ABNORMAL
GLOBULIN UR ELPH-MCNC: 2.8 GM/DL
GLUCOSE SERPL-MCNC: 111 MG/DL (ref 65–99)
GLUCOSE UR STRIP-MCNC: NEGATIVE MG/DL
HCT VFR BLD AUTO: 40.3 % (ref 34–46.6)
HDLC SERPL-MCNC: 61 MG/DL (ref 40–60)
HGB BLD-MCNC: 13.3 G/DL (ref 12–15.9)
IMM GRANULOCYTES # BLD AUTO: 0.02 10*3/MM3 (ref 0–0.05)
IMM GRANULOCYTES NFR BLD AUTO: 0.4 % (ref 0–0.5)
KETONES UR QL: NEGATIVE
LDLC SERPL CALC-MCNC: 151 MG/DL (ref 0–100)
LDLC/HDLC SERPL: 2.41 {RATIO}
LEUKOCYTE EST, POC: ABNORMAL
LYMPHOCYTES # BLD AUTO: 1.36 10*3/MM3 (ref 0.7–3.1)
LYMPHOCYTES NFR BLD AUTO: 24.4 % (ref 19.6–45.3)
Lab: ABNORMAL
MCH RBC QN AUTO: 31 PG (ref 26.6–33)
MCHC RBC AUTO-ENTMCNC: 33 G/DL (ref 31.5–35.7)
MCV RBC AUTO: 93.9 FL (ref 79–97)
MONOCYTES # BLD AUTO: 0.46 10*3/MM3 (ref 0.1–0.9)
MONOCYTES NFR BLD AUTO: 8.2 % (ref 5–12)
NEUTROPHILS NFR BLD AUTO: 3.48 10*3/MM3 (ref 1.7–7)
NEUTROPHILS NFR BLD AUTO: 62.3 % (ref 42.7–76)
NITRITE UR-MCNC: NEGATIVE MG/ML
NRBC BLD AUTO-RTO: 0 /100 WBC (ref 0–0.2)
PH UR: 6 [PH] (ref 5–8)
PLATELET # BLD AUTO: 316 10*3/MM3 (ref 140–450)
PMV BLD AUTO: 11.4 FL (ref 6–12)
POTASSIUM SERPL-SCNC: 4.3 MMOL/L (ref 3.5–5.2)
PROT SERPL-MCNC: 7.5 G/DL (ref 6–8.5)
PROT UR STRIP-MCNC: NEGATIVE MG/DL
RBC # BLD AUTO: 4.29 10*6/MM3 (ref 3.77–5.28)
RBC # UR STRIP: NEGATIVE /UL
SODIUM SERPL-SCNC: 139 MMOL/L (ref 136–145)
SP GR UR: 1.01 (ref 1–1.03)
TRIGL SERPL-MCNC: 179 MG/DL (ref 0–150)
UROBILINOGEN UR QL: NORMAL
VLDLC SERPL-MCNC: 32 MG/DL (ref 5–40)
WBC NRBC COR # BLD: 5.58 10*3/MM3 (ref 3.4–10.8)

## 2022-06-07 PROCEDURE — 99214 OFFICE O/P EST MOD 30 MIN: CPT | Performed by: INTERNAL MEDICINE

## 2022-06-07 PROCEDURE — 80053 COMPREHEN METABOLIC PANEL: CPT | Performed by: INTERNAL MEDICINE

## 2022-06-07 PROCEDURE — 81003 URINALYSIS AUTO W/O SCOPE: CPT | Performed by: INTERNAL MEDICINE

## 2022-06-07 PROCEDURE — 80061 LIPID PANEL: CPT | Performed by: INTERNAL MEDICINE

## 2022-06-07 PROCEDURE — 0054A COVID-19 (PFIZER) 12+ YRS: CPT | Performed by: INTERNAL MEDICINE

## 2022-06-07 PROCEDURE — 87086 URINE CULTURE/COLONY COUNT: CPT | Performed by: INTERNAL MEDICINE

## 2022-06-07 PROCEDURE — 85025 COMPLETE CBC W/AUTO DIFF WBC: CPT | Performed by: INTERNAL MEDICINE

## 2022-06-07 PROCEDURE — 91305 COVID-19 (PFIZER) 12+ YRS: CPT | Performed by: INTERNAL MEDICINE

## 2022-06-07 RX ORDER — METHOCARBAMOL 750 MG/1
750 TABLET, FILM COATED ORAL 4 TIMES DAILY PRN
Qty: 60 TABLET | Refills: 0 | Status: SHIPPED | OUTPATIENT
Start: 2022-06-07 | End: 2022-12-06

## 2022-06-07 RX ORDER — HYDROCHLOROTHIAZIDE 25 MG/1
25 TABLET ORAL DAILY
Qty: 90 TABLET | Refills: 1 | Status: SHIPPED | OUTPATIENT
Start: 2022-06-07 | End: 2022-12-06 | Stop reason: SDUPTHER

## 2022-06-07 RX ORDER — FLUOXETINE HYDROCHLORIDE 20 MG/1
20 CAPSULE ORAL DAILY
Qty: 90 CAPSULE | Refills: 1 | Status: SHIPPED | OUTPATIENT
Start: 2022-06-07 | End: 2022-12-06 | Stop reason: SDUPTHER

## 2022-06-07 RX ORDER — AMLODIPINE BESYLATE 10 MG/1
10 TABLET ORAL DAILY
Qty: 30 TABLET | Refills: 1 | Status: SHIPPED | OUTPATIENT
Start: 2022-06-07 | End: 2022-12-05

## 2022-06-07 NOTE — PROGRESS NOTES
"Subjective       Gaby Ratliff is a 61 y.o. female.     Chief Complaint   Patient presents with   • Hypertension     6 month follow up fasting        History obtained from the patient.      History of Present Illness        The patient was diagnosed with a right upper lobe Pulmonary Nodule in April 2014, per CT Chest done at [facility].  CT Chest done at [facility] on 7/9/2014 was read as a stable right middle lobe lung nodule.  CT Chest done on 4/19/2017, at [facility] showed the right middle lobe nodule to be slightly smaller than the previous CT.  On 7/31/2019, CT Chest at [facility] showed a right upper lobe 1 cm nodule.  Right middle lobe nodule had decreased in size.  She saw Dr. Mason, who recommended a 3-month follow-up CT.  Due to the COVID-19 Pandemic, this did not get done until 7/20/2020.  CT showed a RUL ground glass nodule, stable x1 year.  She saw Dr. Mason same day, and he recommended a 1 year follow-up.    She saw Dr. Mason on 7/26/2021.  He felt her abnormal CT chest findings were most consistent with chronic histoplasmosis.  He ordered multiple blood test (fungal antibodies, histoplasmosis antigen, and quantiferon TB), which were negative.  Biopsy has not been done because the nodule is \"too close to the heart\" per patient report.  PET scan on 8/10/2021 showed the right upper lobe pulmonary nodular focus or cluster of micronodules to be nonhypermetabolic.  Last CT chest done 5/19/2022 (results below).  She states Dr. Mason felt things were stable and is going to follow the nodules for total of 5 years.  Her cough is completely resolved. She denies hemoptysis.    CT CHEST 5/19/22  IMPRESSION:  1.There is a solid, noncalcified pulmonary nodule in the right middle lobe centrally and inferiorly measuring up to 1.8 cm. This is been seen and is unchanged since 11/18/2021. This has not been evaluated with whole body PET/CT. I would recommend PET/CT   examination for better " evaluation of this nodule. This nodule was not present at time of previous whole body PET/CT 7/27/2021. The other pulmonary nodules are unchanged since 7/31/2019. The solid nodules are stable for greater than 2 years which is   consistent with benign nature. A groundglass nodule in the right upper lobe has been stable for at least 2 years but should be followed to confirm stability for greater than 3 years with CT.  2.The findings are most consistent with postinfectious/inflammatory nodules and prior granulomatous disease exposure.        The patient is followed by Dr. Ortiz for a history of Left Breast Cancer, last appointment 6/8/2021 and next appointment for 6/21/2022.  Mammogram on 6/8/2021 was category 2.  She has her Mammogram scheduled.  She denies any breast symptoms.  She states her last Pap was on 7/22/2021, normal.     The patient has Chronic Low Back Pain and Hip Pain.  She saw Orthopedics.  She was diagnosed with Osteoarthritis and a pinched nerve.  She is on Robaxin as needed, off Meloxicam.  She also takes Glucosamine/Chondroitin and Turmeric.       Primary Care Cardiac Diagnostic Constellation: The patient is here today for a follow-up visit.      Her Hypertension has been overall stable.   Medication(s): Amlodipine, HCTZ, and Aspirin.   The patient is adherent with her medication regimen.   Medication Side Effects: Cough and dizziness.     Procedures: On 7/16/2019, Cardiac CT scan showed Calcium Score 0.     Interval Events: On 10/14/2021, the patient stopped Lisinopril (due to a cough) and started Amlodipine.  Since then, blood pressure at home has been 130's-160's / 70's-90's (home blood pressure log reviewed).  On 10/14/2021, LDL was 173, TG 213.  The patient declined starting Atorvastatin, opting to work on diet and exercise more.     Symptoms:  Denies chest pain, dyspnea, MELISSA, orthopnea, PND, palpitations, syncope, lower extremity edema, claudication, lightheadedness, and  dizziness.   Associated Symptoms: Weight up 7 pounds since 10/14/2021.  No fatigue, headaches, polyuria, polydipsia, arthralgias, myalgias, visual impairment, memory loss, concentration issues, or focal neurological deficit.      Lifestyle: She consumes a diverse and healthy diet. She does yard work daily.   Tobacco Use: Former smoker.      GERD Follow-up: The patient is being seen for a routine clinic follow-up of Gastroesophageal Reflux Disease, which is stable.   Comorbid Illness: Irritable Bowel Syndrome.  Interval Events: None.  Symptoms:   No abdominal pain, heartburn, acid regurgitation, nausea, vomiting, hematemesis, dysphagia, odynophagia, hematochezia, melena, early satiety, belching, or bloating.   Associated Symptoms:  No chronic cough, sore throat, hoarseness, or wheezing.   Medications: Omeprazole.  Takes Prozac for IBS.        Colonic Polyp Follow-up: The patient is being seen for a routine clinic follow-up of Colon Polyp(s), which is stable.   Pertinent Medical History: Anal Fissure.  Interval Events:  Current diagnosis was determined by Colonoscopy and last 12/13/17, no polyps.   Symptoms:   No abdominal pain, diarrhea, constipation, hematochezia, melena, decreased stool caliber, or change in bowel habits.    Associated Symptoms: no rectal prolapse.   Medication: Docusate Sodium.    Current Outpatient Medications on File Prior to Visit   Medication Sig Dispense Refill   • acetaminophen (TYLENOL) 500 MG tablet Take 1,000 mg by mouth 2 (Two) Times a Day As Needed for Mild Pain .     • aspirin 81 MG EC tablet Take 81 mg by mouth Every Other Day.     • calcium citrate-vitamin d (CALCITRATE) 315-250 MG-UNIT tablet tablet Take 2 tablets by mouth Daily.     • Docusate Calcium (STOOL SOFTENER PO) 2 capsules Daily.     • fluticasone (FLONASE) 50 MCG/ACT nasal spray into each nostril daily.     • GARLIC OIL PO Take  by mouth.     • Glucos-Chondroit-Hyaluron-MSM (GLUCOSAMINE CHONDROITIN JOINT PO) 2 tablets  Daily.     • Loratadine 10 MG capsule Take  by mouth.     • montelukast (SINGULAIR) 10 MG tablet TAKE ONE TABLET BY MOUTH DAILY 30 tablet 11   • Multiple Vitamins-Minerals (MULTI VITAMIN/MINERALS PO) Take  by mouth daily.     • OMEGA 3-6-9 FATTY ACIDS PO Take  by mouth.     • omeprazole (priLOSEC) 20 MG capsule Take 20 mg by mouth Every Other Day.     • Probiotic Product (ALIGN) 4 MG capsule 1 capsule Daily.     • Triamcinolone Acetonide (NASACORT AQ NA) into the nostril(s) as directed by provider.     • Turmeric 500 MG capsule Take 1 capsule by mouth Daily.     • ipratropium (ATROVENT) 0.03 % nasal spray Every 12 (Twelve) Hours.       No current facility-administered medications on file prior to visit.       Current outpatient and discharge medications have been reconciled for the patient.  Reviewed by: Eun Mcmanus MD        The following portions of the patient's history were reviewed and updated as appropriate: allergies, current medications, past family history, past medical history, past social history, past surgical history and problem list.    Review of Systems   Constitutional: Positive for unexpected weight change. Negative for fatigue.   Eyes: Negative for visual disturbance.   Respiratory: Negative for cough, shortness of breath and wheezing.    Cardiovascular: Negative for chest pain, palpitations and leg swelling.        No MELISSA, orthopnea, or claudication.   Gastrointestinal: Negative for abdominal pain, blood in stool, constipation, diarrhea, nausea and vomiting.        Denies melena.   Endocrine: Negative for polydipsia and polyuria.   Musculoskeletal: Negative for arthralgias and myalgias.   Neurological: Negative for dizziness, syncope, light-headedness and headaches.        No memory issues.   Psychiatric/Behavioral: Negative for decreased concentration.         Objective       Blood pressure 132/82, pulse 76, temperature 97.5 °F (36.4 °C), temperature source Temporal, resp. rate 20, weight 92.1  kg (203 lb 1 oz), not currently breastfeeding.  Body mass index is 34.58 kg/m².      Physical Exam  Vitals and nursing note reviewed.   Constitutional:       Appearance: She is well-developed. She is obese.   Neck:      Thyroid: No thyroid mass or thyromegaly.      Vascular: No carotid bruit.   Cardiovascular:      Rate and Rhythm: Normal rate and regular rhythm.      Pulses: Normal pulses.      Heart sounds: Normal heart sounds. No murmur heard.    No friction rub. No gallop.   Pulmonary:      Effort: Pulmonary effort is normal.      Breath sounds: Normal breath sounds.   Abdominal:      General: Bowel sounds are normal. There is no distension or abdominal bruit.      Palpations: Abdomen is soft. There is no hepatomegaly, splenomegaly or mass.      Tenderness: There is no abdominal tenderness.   Musculoskeletal:      Cervical back: Normal range of motion and neck supple.      Right lower leg: No edema.      Left lower leg: No edema.   Neurological:      Mental Status: She is alert.   Psychiatric:         Mood and Affect: Mood normal.       Results for orders placed or performed in visit on 06/07/22   POC Urinalysis Dipstick, Automated    Specimen: Urine   Result Value Ref Range    Color Yellow Yellow, Straw, Dark Yellow, Dede    Clarity, UA Cloudy (A) Clear    Specific Gravity  1.015 1.005 - 1.030    pH, Urine 6.0 5.0 - 8.0    Leukocytes 500 Marti/ul (A) Negative    Nitrite, UA Negative Negative    Protein, POC Negative Negative mg/dL    Glucose, UA Negative Negative, 1000 mg/dL (3+) mg/dL    Ketones, UA Negative Negative    Urobilinogen, UA Normal Normal    Bilirubin Negative Negative    Blood, UA Negative Negative    Lot Number [id number]     Expiration Date 02/28/2023        Assessment / Plan:  Diagnoses and all orders for this visit:    1. Primary hypertension (Primary)  -     POC Urinalysis Dipstick, Automated  -     Lipid Panel  -     Comprehensive Metabolic Panel  -     CBC & Differential  -     amLODIPine  (NORVASC) 10 MG tablet; Take 1 tablet by mouth Daily.  Dispense: 30 tablet; Refill: 1- REFILL  -     hydroCHLOROthiazide (HYDRODIURIL) 25 MG tablet; Take 1 tablet by mouth Daily.  Dispense: 90 tablet; Refill: 1- REFILL   Continue current medication(s) as noted in the history of present illness.    2. Lung nodule, solitary   Follow up per Pulmonary.    3. Gastroesophageal reflux disease, unspecified whether esophagitis present   Continue current medication(s) as noted in the history of present illness.    4. Benign colonic polyp   Colonoscopy up-to-date.    5. Irritable bowel syndrome without diarrhea  -     FLUoxetine (PROzac) 20 MG capsule; Take 1 capsule by mouth Daily.  Dispense: 90 capsule; Refill: 1- REFILL    6. Lumbar spondylosis  -     methocarbamol (ROBAXIN) 750 MG tablet; Take 1 tablet by mouth 4 (Four) Times a Day As Needed for Muscle Spasms.  Dispense: 60 tablet; Refill: 0- REFILL    7. Need for COVID-19 vaccine  -     COVID-19 Vaccine (Pfizer) Gray Cap    8. Leukocytes in urine  -     Urine Culture - Urine, Urine, Clean Catch        I recommend Shingrix (new Shingles vaccine) at the pharmacy.      Return in about 6 years (around 6/7/2028) for Annual physical, fasting (after 10/14/22).

## 2022-06-08 LAB — BACTERIA SPEC AEROBE CULT: NORMAL

## 2022-12-04 DIAGNOSIS — I10 PRIMARY HYPERTENSION: ICD-10-CM

## 2022-12-05 RX ORDER — AMLODIPINE BESYLATE 10 MG/1
TABLET ORAL
Qty: 60 TABLET | Refills: 2 | Status: SHIPPED | OUTPATIENT
Start: 2022-12-05 | End: 2022-12-06 | Stop reason: SDUPTHER

## 2022-12-06 ENCOUNTER — LAB (OUTPATIENT)
Dept: LAB | Facility: HOSPITAL | Age: 62
End: 2022-12-06

## 2022-12-06 ENCOUNTER — OFFICE VISIT (OUTPATIENT)
Dept: INTERNAL MEDICINE | Facility: CLINIC | Age: 62
End: 2022-12-06

## 2022-12-06 VITALS
DIASTOLIC BLOOD PRESSURE: 72 MMHG | SYSTOLIC BLOOD PRESSURE: 142 MMHG | HEART RATE: 72 BPM | HEIGHT: 64 IN | WEIGHT: 201 LBS | BODY MASS INDEX: 34.31 KG/M2 | RESPIRATION RATE: 22 BRPM

## 2022-12-06 DIAGNOSIS — J30.2 SEASONAL ALLERGIC RHINITIS, UNSPECIFIED TRIGGER: ICD-10-CM

## 2022-12-06 DIAGNOSIS — Z00.00 ENCOUNTER FOR HEALTH MAINTENANCE EXAMINATION IN ADULT: Primary | ICD-10-CM

## 2022-12-06 DIAGNOSIS — I10 PRIMARY HYPERTENSION: ICD-10-CM

## 2022-12-06 DIAGNOSIS — Z79.899 HIGH RISK MEDICATION USE: ICD-10-CM

## 2022-12-06 DIAGNOSIS — Z23 NEED FOR COVID-19 VACCINE: ICD-10-CM

## 2022-12-06 DIAGNOSIS — K21.9 GASTROESOPHAGEAL REFLUX DISEASE, UNSPECIFIED WHETHER ESOPHAGITIS PRESENT: ICD-10-CM

## 2022-12-06 DIAGNOSIS — Z00.00 ENCOUNTER FOR HEALTH MAINTENANCE EXAMINATION IN ADULT: ICD-10-CM

## 2022-12-06 DIAGNOSIS — K63.5 BENIGN COLONIC POLYP: ICD-10-CM

## 2022-12-06 DIAGNOSIS — R91.1 LUNG NODULE, SOLITARY: ICD-10-CM

## 2022-12-06 DIAGNOSIS — K58.9 IRRITABLE BOWEL SYNDROME WITHOUT DIARRHEA: ICD-10-CM

## 2022-12-06 LAB
BASOPHILS # BLD AUTO: 0.11 10*3/MM3 (ref 0–0.2)
BASOPHILS NFR BLD AUTO: 1.7 % (ref 0–1.5)
DEPRECATED RDW RBC AUTO: 43.2 FL (ref 37–54)
EOSINOPHIL # BLD AUTO: 0.22 10*3/MM3 (ref 0–0.4)
EOSINOPHIL NFR BLD AUTO: 3.3 % (ref 0.3–6.2)
ERYTHROCYTE [DISTWIDTH] IN BLOOD BY AUTOMATED COUNT: 12.8 % (ref 12.3–15.4)
HCT VFR BLD AUTO: 38.7 % (ref 34–46.6)
HGB BLD-MCNC: 13.2 G/DL (ref 12–15.9)
IMM GRANULOCYTES # BLD AUTO: 0.01 10*3/MM3 (ref 0–0.05)
IMM GRANULOCYTES NFR BLD AUTO: 0.2 % (ref 0–0.5)
LYMPHOCYTES # BLD AUTO: 1.3 10*3/MM3 (ref 0.7–3.1)
LYMPHOCYTES NFR BLD AUTO: 19.6 % (ref 19.6–45.3)
MCH RBC QN AUTO: 31.7 PG (ref 26.6–33)
MCHC RBC AUTO-ENTMCNC: 34.1 G/DL (ref 31.5–35.7)
MCV RBC AUTO: 92.8 FL (ref 79–97)
MONOCYTES # BLD AUTO: 0.44 10*3/MM3 (ref 0.1–0.9)
MONOCYTES NFR BLD AUTO: 6.6 % (ref 5–12)
NEUTROPHILS NFR BLD AUTO: 4.54 10*3/MM3 (ref 1.7–7)
NEUTROPHILS NFR BLD AUTO: 68.6 % (ref 42.7–76)
NRBC BLD AUTO-RTO: 0.3 /100 WBC (ref 0–0.2)
PLATELET # BLD AUTO: 318 10*3/MM3 (ref 140–450)
PMV BLD AUTO: 10.9 FL (ref 6–12)
RBC # BLD AUTO: 4.17 10*6/MM3 (ref 3.77–5.28)
WBC NRBC COR # BLD: 6.62 10*3/MM3 (ref 3.4–10.8)

## 2022-12-06 PROCEDURE — 99396 PREV VISIT EST AGE 40-64: CPT | Performed by: INTERNAL MEDICINE

## 2022-12-06 PROCEDURE — 0124A PR ADM SARSCOV2 30MCG/0.3ML BST: CPT | Performed by: INTERNAL MEDICINE

## 2022-12-06 PROCEDURE — 83735 ASSAY OF MAGNESIUM: CPT

## 2022-12-06 PROCEDURE — 80050 GENERAL HEALTH PANEL: CPT

## 2022-12-06 PROCEDURE — 80061 LIPID PANEL: CPT

## 2022-12-06 PROCEDURE — 91312 COVID-19 (PFIZER) BIVALENT BOOSTER 12+YRS: CPT | Performed by: INTERNAL MEDICINE

## 2022-12-06 RX ORDER — AMLODIPINE BESYLATE 10 MG/1
10 TABLET ORAL DAILY
Qty: 90 TABLET | Refills: 3 | Status: SHIPPED | OUTPATIENT
Start: 2022-12-06

## 2022-12-06 RX ORDER — MONTELUKAST SODIUM 10 MG/1
10 TABLET ORAL DAILY
Qty: 90 TABLET | Refills: 3 | Status: SHIPPED | OUTPATIENT
Start: 2022-12-06

## 2022-12-06 RX ORDER — FLUOXETINE HYDROCHLORIDE 20 MG/1
20 CAPSULE ORAL DAILY
Qty: 90 CAPSULE | Refills: 3 | Status: SHIPPED | OUTPATIENT
Start: 2022-12-06 | End: 2023-01-12 | Stop reason: SDUPTHER

## 2022-12-06 RX ORDER — HYDROCHLOROTHIAZIDE 25 MG/1
25 TABLET ORAL DAILY
Qty: 90 TABLET | Refills: 3 | Status: SHIPPED | OUTPATIENT
Start: 2022-12-06

## 2022-12-06 RX ORDER — OMEPRAZOLE 20 MG/1
20 CAPSULE, DELAYED RELEASE ORAL EVERY OTHER DAY
Qty: 90 CAPSULE | Refills: 3 | Status: SHIPPED | OUTPATIENT
Start: 2022-12-06

## 2022-12-06 NOTE — PROGRESS NOTES
"          Subjective     Chief Complaint:  Physical Exam.    History of Present Illness    History obtained from the patient.    The patient was diagnosed with a right upper lobe Pulmonary Nodule in April 2014, per CT Chest done at [facility].  CT Chest done at [facility] on 7/9/2014 was read as a stable right middle lobe lung nodule.  CT Chest done on 4/19/2017, at [facility] showed the right middle lobe nodule to be slightly smaller than the previous CT.  On 7/31/2019, CT Chest at [facility] showed a right upper lobe 1 cm nodule.  Right middle lobe nodule had decreased in size.  She saw Dr. Mason.  On 7/20/2020.  CT showed a RUL ground glass nodule, stable x1 year.  Dr. Mason recommended a 1 year follow-up.    She saw Dr. Mason on 7/26/2021.  He felt her abnormal CT chest findings were most consistent with chronic histoplasmosis.  He ordered multiple blood test (fungal antibodies, histoplasmosis antigen, and quantiferon TB), which were negative.  Biopsy has not been done because the nodule is \"too close to the heart\" per patient report.  PET scan on 8/10/2021 showed the right upper lobe pulmonary nodular focus or cluster of micronodules to be nonhypermetabolic.  Last CT chest done 5/19/2022 showed a right middle lobe nodule to be unchanged  since 11/18/2021, but not previously evaluated by the PET scan done on 8/10/2021.  The other solid pulmonary nodules were unchanged since 7/31/2019.  The groundglass nodule in the right upper lobe was stable for at least 2 years.  Findings were most consistent with postinfectious/inflammatory nodules and granulomatous disease exposure.  She states Dr. Mason felt things were stable and is going to follow the nodules for a total of 5 years.        The patient has Chronic Allergic Rhinitis.  She reports clear rhinorrhea, postnasal drainage, a chronic cough, and sinus pain/pressure.  She denies chest pain, chest tightness, shortness of breath, hemoptysis, " and wheezing.   She she has been seeing an Allergist.  She has been on Claritin, Flonase, Singulair, and Nasacort with minimal relief.  She just added Sudafed.  She has not had allergy shots.  She has also seen ENT.    He patient is followed by Dr. Ortiz for a history of Left Breast Cancer, last appointment  6/21/2022.  Mammogram on 6/14/2022 was category 2.    She denies any breast symptoms.  She states her last Pap was on 7/8/2022, normal.     The patient has Chronic Low Back Pain and Hip Pain.  She saw Orthopedics.  She was diagnosed with Osteoarthritis and a pinched nerve.  She is off Robaxin and Meloxicam.  She also takes Glucosamine/Chondroitin and Turmeric.       Primary Care Cardiac Diagnostic Constellation: The patient is here today for a follow-up visit.      Her Hypertension has been overall stable.   Medication(s): Amlodipine, HCTZ, and Aspirin.   The patient is adherent with her medication regimen.   Side Effects: None.     Procedures: On 7/16/2019, Cardiac CT scan showed Calcium Score 0.     Interval Events: Blood pressure at home has been 130's-160's / 70's-90's (home blood pressure log reviewed).  On 10/14/2021, LDL was 173, TG 213.  The patient declined starting Atorvastatin, opting to work on diet and exercise more.     Symptoms:  Denies chest pain, dyspnea, MELISSA, orthopnea, PND, palpitations, syncope, lower extremity edema, claudication, lightheadedness, and dizziness.   Associated Symptoms: Weight down 2 pounds in the past 6 months.  No fatigue, headaches, polyuria, polydipsia, arthralgias, myalgias, visual impairment, memory loss, concentration issues, or focal neurological deficit.      Lifestyle: She consumes a diverse and healthy diet. She walks daily.   Tobacco Use: Former smoker.      GERD Follow-up: The patient is being seen for a routine clinic follow-up of Gastroesophageal Reflux Disease, which is stable.   Comorbid Illness: Irritable Bowel Syndrome.  Takes Prozac.  Interval Events:  None.  Symptoms: Reports intermittent acid reflux.  No abdominal pain, heartburn,  nausea, vomiting, hematemesis, dysphagia, odynophagia, hematochezia, melena, early satiety, belching, or bloating.   Associated Symptoms:  No chronic cough, sore throat, hoarseness, or wheezing.   Medications: Omeprazole.        Colonic Polyp Follow-up: The patient is being seen for a routine clinic follow-up of Colon Polyp(s), which is stable.   Pertinent Medical History: Anal Fissure.  Interval Events:  Current diagnosis was determined by Colonoscopy and last 12/13/17, no polyps.   Symptoms:   No abdominal pain, diarrhea, constipation, hematochezia, melena, decreased stool caliber, or change in bowel habits.    Associated Symptoms: no rectal prolapse.   Medication: Docusate Sodium.      Gaby Ratliff is a 62 y.o. female who presents for an Annual Physical.      PMH, PSH, SocHx, FamHx, Allergies, and Medications: Reviewed and updated.    Outpatient Medications Prior to Visit   Medication Sig Dispense Refill   • acetaminophen (TYLENOL) 500 MG tablet Take 1,000 mg by mouth 2 (Two) Times a Day As Needed for Mild Pain .     • aspirin 81 MG EC tablet Take 81 mg by mouth Every Other Day.     • calcium citrate-vitamin d (CALCITRATE) 315-250 MG-UNIT tablet tablet Take 2 tablets by mouth Daily.     • Docusate Calcium (STOOL SOFTENER PO) 2 capsules Daily.     • fluticasone (FLONASE) 50 MCG/ACT nasal spray into each nostril daily.     • GARLIC OIL PO Take  by mouth.     • Glucos-Chondroit-Hyaluron-MSM (GLUCOSAMINE CHONDROITIN JOINT PO) 2 tablets Daily.     • Loratadine 10 MG capsule Take  by mouth.     • Multiple Vitamins-Minerals (MULTI VITAMIN/MINERALS PO) Take  by mouth daily.     • OMEGA 3-6-9 FATTY ACIDS PO Take  by mouth.     • Probiotic Product (ALIGN) 4 MG capsule 1 capsule Daily.     • Triamcinolone Acetonide (NASACORT AQ NA) into the nostril(s) as directed by provider.     • Turmeric 500 MG capsule Take 1 capsule by mouth Daily.      • amLODIPine (NORVASC) 10 MG tablet TAKE ONE TABLET BY MOUTH DAILY 60 tablet 2   • FLUoxetine (PROzac) 20 MG capsule Take 1 capsule by mouth Daily. 90 capsule 1   • hydroCHLOROthiazide (HYDRODIURIL) 25 MG tablet Take 1 tablet by mouth Daily. 90 tablet 1   • montelukast (SINGULAIR) 10 MG tablet TAKE ONE TABLET BY MOUTH DAILY 30 tablet 11   • omeprazole (priLOSEC) 20 MG capsule Take 20 mg by mouth Every Other Day.     • methocarbamol (ROBAXIN) 750 MG tablet Take 1 tablet by mouth 4 (Four) Times a Day As Needed for Muscle Spasms. 60 tablet 0     No facility-administered medications prior to visit.       Immunization History   Administered Date(s) Administered   • COVID-19 (PFIZER) BIVALENT BOOSTER 12+YRS 12/06/2022   • COVID-19 (PFIZER) PURPLE CAP 04/04/2021, 04/26/2021, 11/19/2021   • Covid-19 (Pfizer) Gray Cap 06/07/2022   • Flu Vaccine Quad PF >36MO 10/04/2016, 10/07/2017   • FluLaval/Fluzone >6mos 12/19/2018, 10/14/2019, 09/25/2020, 10/14/2021   • FluMist 2-49yrs 01/10/2016, 10/07/2017   • Fluzone Quad >6mos (Multi-dose) 11/12/2022   • Hepatitis A 12/19/2018, 06/28/2019   • Tdap 03/21/2011, 10/14/2021         Patient Active Problem List   Diagnosis   • History of left breast cancer   • Lung nodule, solitary   • Allergic rhinitis   • Anemia   • Benign colonic polyp   • Gastroesophageal reflux disease   • Hypertension   • Irritable bowel syndrome   • Menopausal state   • Sigmoid diverticulosis   • Internal hemorrhoids       Health Habits:  Dental Exam. up to date  Eye Exam. up to date  Hearing Loss:  No  Exercise: 7 times/week.  Current exercise activities include: walking  Diet: Healthy  Multivitamin: Yes    Safe Driving:  Yes  Seat Belt:  Yes  Bike Helmet:  N/A  Skin Screening:  Yes  Sunscreen: Yes  SBE / COLE: Yes, occasionally  Sexual Activity:    Birth Control:  Menopause  STD Prevention:  N/A    Last Pap: 7/8/2022, normal per patient report (GYN).  Last Mammogram: 6/14/2022, category 2.  Last DEXA Scan:  2017, normal.  Last Colonoscopy: 2017, internal hemorrhoids and diverticulosis.  Last PSA: N/A    Social:    Social History     Socioeconomic History   • Marital status:    • Number of children: 2   Tobacco Use   • Smoking status: Former     Packs/day: 0.50     Years: 5.00     Pack years: 2.50     Types: Cigarettes     Quit date:      Years since quittin.9   • Smokeless tobacco: Never   • Tobacco comments:     High school & college   Vaping Use   • Vaping Use: Never used   Substance and Sexual Activity   • Alcohol use: Yes     Alcohol/week: 14.0 standard drinks     Types: 14 Glasses of wine per week     Comment: 1-3 glasses of wine daily   • Drug use: No   • Sexual activity: Yes     Partners: Male     Birth control/protection: Post-menopausal         Current Medical Providers:    Eun Mcmanus MD (Internal Medicine / Pediatrics)    The [facility] providers who are involved in the care of this patient are listed above.         Review of Systems   Constitutional: Negative for chills, fatigue, fever and unexpected weight change.        Has night sweats.    HENT: Positive for postnasal drip, rhinorrhea (clear), sinus pressure and sinus pain. Negative for congestion, ear discharge, ear pain, hearing loss, nosebleeds, sneezing, sore throat, tinnitus and voice change.         Denies snoring.   Eyes: Positive for discharge (watery and wakes with yellow d/c) and itching. Negative for photophobia, pain, redness and visual disturbance.   Respiratory: Positive for cough. Negative for chest tightness, shortness of breath, wheezing and stridor.         No chest congestion.  No hemoptysis.   Cardiovascular: Negative for chest pain, palpitations and leg swelling.        No orthopnea, MELISSA, or PND.  No claudication or syncope.   Gastrointestinal: Negative for abdominal pain, blood in stool, constipation, diarrhea, nausea, rectal pain and vomiting.        Intermittent acid reflux.  No melena.  No  "hematemesis.  No heartburn, dysphagia or odynophagia.  No early satiety, belching, or bloating.    Endocrine: Negative for cold intolerance, heat intolerance, polydipsia, polyphagia and polyuria.        No hair loss or dry skin. Has occasional hot flashes.     Genitourinary: Negative for difficulty urinating, dyspareunia, dysuria, flank pain, frequency, hematuria, pelvic pain, urgency, vaginal bleeding and vaginal discharge.        No nocturia, incomplete emptying, or incontinence.   Musculoskeletal: Positive for back pain (intermittent low back). Negative for arthralgias, gait problem, joint swelling, myalgias, neck pain and neck stiffness.        Has intermittent  left hip pain and stiffness.   Skin: Negative for rash.        No new skin lesions or changes in skin lesions. No breast pain or masses.  No nipple discharge or nipple inversion.   Allergic/Immunologic: Positive for environmental allergies.   Neurological: Negative for dizziness, tremors, syncope, speech difficulty, weakness, light-headedness, numbness and headaches.        No tingling.  No memory loss.  No decreased concentration.   Hematological: Negative for adenopathy. Does not bruise/bleed easily.   Psychiatric/Behavioral: Negative for confusion, self-injury, sleep disturbance and suicidal ideas. The patient is not nervous/anxious.         No depression.           Objective     Vitals:    12/06/22 0902   BP: 142/72   Pulse: 72   Resp: 22   Weight: 91.2 kg (201 lb)   Height: 162.6 cm (64\")       Body mass index is 34.5 kg/m².    Physical Exam  Vitals and nursing note reviewed.   Constitutional:       Appearance: Normal appearance. She is well-developed.      Comments:   BMI greater than 30.   HENT:      Head: Normocephalic and atraumatic.      Right Ear: Tympanic membrane, ear canal and external ear normal.      Left Ear: Tympanic membrane, ear canal and external ear normal.      Mouth/Throat:      Mouth: Mucous membranes are moist. No oral lesions. "      Pharynx: Oropharynx is clear.      Comments: Tonsils normal.  Eyes:      Extraocular Movements: Extraocular movements intact.      Conjunctiva/sclera: Conjunctivae normal.      Pupils: Pupils are equal, round, and reactive to light.      Comments: Fundi normal bilaterally.   Neck:      Thyroid: No thyromegaly.      Vascular: No carotid bruit.   Cardiovascular:      Rate and Rhythm: Normal rate and regular rhythm.      Pulses: Normal pulses.      Heart sounds: Normal heart sounds. No murmur heard.    No friction rub. No gallop.   Pulmonary:      Effort: Pulmonary effort is normal.      Breath sounds: Normal breath sounds.      Comments: The patient declined a breast exam today.  Abdominal:      General: Bowel sounds are normal. There is no distension or abdominal bruit.      Palpations: Abdomen is soft. There is no hepatomegaly, splenomegaly or mass.      Tenderness: There is no abdominal tenderness.   Genitourinary:     Comments:  and rectal exam deferred.  Musculoskeletal:         General: Normal range of motion.      Cervical back: Normal range of motion and neck supple.      Right lower leg: No edema.      Left lower leg: No edema.   Lymphadenopathy:      Cervical: No cervical adenopathy.      Upper Body:      Right upper body: No supraclavicular adenopathy.      Left upper body: No supraclavicular adenopathy.   Skin:     Findings: No rash.      Comments: No atypical skin lesions.   Neurological:      Mental Status: She is alert.      Cranial Nerves: Cranial nerves are intact.      Motor: Motor function is intact. No abnormal muscle tone.      Coordination: Coordination is intact.      Gait: Gait is intact.      Deep Tendon Reflexes: Reflexes are normal and symmetric.   Psychiatric:         Mood and Affect: Mood normal.         PHQ-2 Depression Screening  Little interest or pleasure in doing things? 0-->not at all   Feeling down, depressed, or hopeless? 0-->not at all   PHQ-2 Total Score 0          Counseling was given to patient for the following topics: appropriate exercise, healthy eating habits, disease prevention, risk factors for cancer, importance of self breast exam and breast health, importance of immunizations, including risks and benefits, bone health, sun safety, seatbelt use and safe driving. Also discussed the importance of regular dental and vision care, as well recommendation for a yearly screening skin exam after age 40.  Written information provided to patient on these topics and other health maintenance issues.        Diagnoses and all orders for this visit:    1. Encounter for health maintenance examination in adult (Primary)  -     Lipid Panel; Future  -     Comprehensive Metabolic Panel; Future  -     TSH; Future  -     CBC & Differential; Future   The patient agrees to schedule her DEXA Bone Density Scan through Dr. Lara's office.    2. Primary hypertension  -     Lipid Panel; Future  -     Comprehensive Metabolic Panel; Future  -     TSH; Future  -     CBC & Differential; Future  -     amLODIPine (NORVASC) 10 MG tablet; Take 1 tablet by mouth Daily.  Dispense: 90 tablet; Refill: 3- REFILL  -     hydroCHLOROthiazide (HYDRODIURIL) 25 MG tablet; Take 1 tablet by mouth Daily.  Dispense: 90 tablet; Refill: 3- REFILL   Continue current medication(s) as noted in the history of present illness.    3. Gastroesophageal reflux disease, unspecified whether esophagitis present  -     omeprazole (priLOSEC) 20 MG capsule; Take 1 capsule by mouth Every Other Day.  Dispense: 90 capsule; Refill: 3- REFILL   Continue current medication(s) as noted in the history of present illness.    4. Lung nodule, solitary   Follow up per Pulmonary.    5. Benign colonic polyp   Colonoscopy up-to-date.    6. High risk medication use  -     Magnesium; Future    7. Irritable bowel syndrome without diarrhea  -     FLUoxetine (PROzac) 20 MG capsule; Take 1 capsule by mouth Daily.  Dispense: 90 capsule; Refill:  3- REFILL   Continue current medication(s) as noted in the history of present illness.    8. Seasonal allergic rhinitis, unspecified trigger  -     montelukast (SINGULAIR) 10 MG tablet; Take 1 tablet by mouth Daily.  Dispense: 90 tablet; Refill: 3- REFILL   Continue current medication(s) as noted in the history of present illness.   Follow up per Allergy.    9. Need for COVID-19 vaccine  -     COVID-19 Bivalent Booster (Pfizer) 12+yrs      I recommended Shingrix (new Shingles vaccine) at the pharmacy.        BMI is >= 30 and <35. (Class 1 Obesity). The following options were offered after discussion;: exercise counseling/recommendations and nutrition counseling/recommendations            Return in about 6 months (around 6/6/2023) for Recheck Hyperlipidemia, fasting.

## 2022-12-06 NOTE — PATIENT INSTRUCTIONS
I recommend Shingrix (new Shingles vaccine) at the pharmacy.      Please schedule your DEXA bone density scan through Dr. Lara's office, as we discussed.    Health Maintenance for Postmenopausal Women  Menopause is a normal process in which your ability to get pregnant comes to an end. This process happens slowly over many months or years, usually between the ages of 48 and 55. Menopause is complete when you have missed your menstrual period for 12 months.  It is important to talk with your health care provider about some of the most common conditions that affect women after menopause (postmenopausal women). These include heart disease, cancer, and bone loss (osteoporosis). Adopting a healthy lifestyle and getting preventive care can help to promote your health and wellness. The actions you take can also lower your chances of developing some of these common conditions.  What are the signs and symptoms of menopause?  During menopause, you may have the following symptoms:  Hot flashes. These can be moderate or severe.  Night sweats.  Decrease in sex drive.  Mood swings.  Headaches.  Tiredness (fatigue).  Irritability.  Memory problems.  Problems falling asleep or staying asleep.  Talk with your health care provider about treatment options for your symptoms.  Do I need hormone replacement therapy?  Hormone replacement therapy is effective in treating symptoms that are caused by menopause, such as hot flashes and night sweats.  Hormone replacement carries certain risks, especially as you become older. If you are thinking about using estrogen or estrogen with progestin, discuss the benefits and risks with your health care provider.  How can I reduce my risk for heart disease and stroke?  The risk of heart disease, heart attack, and stroke increases as you age. One of the causes may be a change in the body's hormones during menopause. This can affect how your body uses dietary fats, triglycerides, and cholesterol.  Heart attack and stroke are medical emergencies. There are many things that you can do to help prevent heart disease and stroke.  Watch your blood pressure  High blood pressure causes heart disease and increases the risk of stroke. This is more likely to develop in people who have high blood pressure readings or are overweight.  Have your blood pressure checked:  Every 3-5 years if you are 18-39 years of age.  Every year if you are 40 years old or older.  Eat a healthy diet    Eat a diet that includes plenty of vegetables, fruits, low-fat dairy products, and lean protein.  Do not eat a lot of foods that are high in solid fats, added sugars, or sodium.  Get regular exercise  Get regular exercise. This is one of the most important things you can do for your health. Most adults should:  Try to exercise for at least 150 minutes each week. The exercise should increase your heart rate and make you sweat (moderate-intensity exercise).  Try to do strengthening exercises at least twice each week. Do these in addition to the moderate-intensity exercise.  Spend less time sitting. Even light physical activity can be beneficial.  Other tips  Work with your health care provider to achieve or maintain a healthy weight.  Do not use any products that contain nicotine or tobacco. These products include cigarettes, chewing tobacco, and vaping devices, such as e-cigarettes. If you need help quitting, ask your health care provider.  Know your numbers. Ask your health care provider to check your cholesterol and your blood sugar (glucose). Continue to have your blood tested as directed by your health care provider.  Do I need screening for cancer?  Depending on your health history and family history, you may need to have cancer screenings at different stages of your life. This may include screening for:  Breast cancer.  Cervical cancer.  Lung cancer.  Colorectal cancer.  What is my risk for osteoporosis?  After menopause, you may be at  increased risk for osteoporosis. Osteoporosis is a condition in which bone destruction happens more quickly than new bone creation. To help prevent osteoporosis or the bone fractures that can happen because of osteoporosis, you may take the following actions:  If you are 19-50 years old, get at least 1,000 mg of calcium and at least 600 international units (IU) of vitamin D per day.  If you are older than age 50 but younger than age 70, get at least 1,200 mg of calcium and at least 600 international units (IU) of vitamin D per day.  If you are older than age 70, get at least 1,200 mg of calcium and at least 800 international units (IU) of vitamin D per day.  Smoking and drinking excessive alcohol increase the risk of osteoporosis. Eat foods that are rich in calcium and vitamin D, and do weight-bearing exercises several times each week as directed by your health care provider.  How does menopause affect my mental health?  Depression may occur at any age, but it is more common as you become older. Common symptoms of depression include:  Feeling depressed.  Changes in sleep patterns.  Changes in appetite or eating patterns.  Feeling an overall lack of motivation or enjoyment of activities that you previously enjoyed.  Frequent crying spells.  Talk with your health care provider if you think that you are experiencing any of these symptoms.  General instructions  See your health care provider for regular wellness exams and vaccines. This may include:  Scheduling regular health, dental, and eye exams.  Getting and maintaining your vaccines. These include:  Influenza vaccine. Get this vaccine each year before the flu season begins.  Pneumonia vaccine.  Shingles vaccine.  Tetanus, diphtheria, and pertussis (Tdap) booster vaccine.  Your health care provider may also recommend other immunizations.  Tell your health care provider if you have ever been abused or do not feel safe at home.  Summary  Menopause is a normal process  in which your ability to get pregnant comes to an end.  This condition causes hot flashes, night sweats, decreased interest in sex, mood swings, headaches, or lack of sleep.  Treatment for this condition may include hormone replacement therapy.  Take actions to keep yourself healthy, including exercising regularly, eating a healthy diet, watching your weight, and checking your blood pressure and blood sugar levels.  Get screened for cancer and depression. Make sure that you are up to date with all your vaccines.  This information is not intended to replace advice given to you by your health care provider. Make sure you discuss any questions you have with your health care provider.  Document Revised: 05/09/2022 Document Reviewed: 05/09/2022  [employer] Patient Education © 2022 [employer]  MyPlate from [employer]  MyPlate is an outline of a general healthy diet based on the Dietary Guidelines for Americans, 6691-2953, from the U.S. Department of Agriculture (USDA). It sets guidelines for how much food you should eat from each food group based on your age, sex, and level of physical activity.  What are tips for following MyPlate?  To follow MyPlate recommendations:  Eat a wide variety of fruits and vegetables, grains, and protein foods.  Serve smaller portions and eat less food throughout the day.  Limit portion sizes to avoid overeating.  Enjoy your food.  Get at least 150 minutes of exercise every week. This is about 30 minutes each day, 5 or more days per week.  It can be difficult to have every meal look like MyPlate. Think about MyPlate as eating guidelines for an entire day, rather than each individual meal.  Fruits and vegetables  Make one half of your plate fruits and vegetables.  Eat many different colors of fruits and vegetables each day.  For a 2,000-calorie daily food plan, eat:  2½ cups of vegetables every day.  2 cups of fruit every day.  1 cup is equal to:  1 cup raw or cooked vegetables.  1 cup raw fruit.  1  medium-sized orange, apple, or banana.  1 cup 100% fruit or vegetable juice.  2 cups raw leafy greens, such as lettuce, spinach, or kale.  ½ cup dried fruit.  Grains  One fourth of your plate should be grains.  Make at least half of the grains you eat each day whole grains.  For a 2,000-calorie daily food plan, eat 6 oz of grains every day.  1 oz is equal to:  1 slice bread.  1 cup cereal.  ½ cup cooked rice, cereal, or pasta.  Protein  One fourth of your plate should be protein.  Eat a wide variety of protein foods, including meat, poultry, fish, eggs, beans, nuts, and tofu.  For a 2,000-calorie daily food plan, eat 5½ oz of protein every day.  1 oz is equal to:  1 oz meat, poultry, or fish.  ¼ cup cooked beans.  1 egg.  ½ oz nuts or seeds.  1 Tbsp peanut butter.  Dairy  Drink fat-free or low-fat (1%) milk.  Eat or drink dairy as a side to meals.  For a 2,000-calorie daily food plan, eat or drink 3 cups of dairy every day.  1 cup is equal to:  1 cup milk, yogurt, cottage cheese, or soy milk (soy beverage).  2 oz processed cheese.  1½ oz natural cheese.  Fats, oils, salt, and sugars  Only small amounts of oils are recommended.  Avoid foods that are high in calories and low in nutritional value (empty calories), like foods high in fat or added sugars.  Choose foods that are low in salt (sodium). Choose foods that have less than 140 milligrams (mg) of sodium per serving.  Drink water instead of sugary drinks. Drink enough fluid to keep your urine pale yellow.  Where to find support  Work with your health care provider or a dietitian to develop a customized eating plan that is right for you.  Download an rubi (mobile application) to help you track your daily food intake.  Where to find more information  USDA: ChooseMyPlate.gov  Summary  MyPlate is a general guideline for healthy eating from the USDA. It is based on the Dietary Guidelines for Americans, 3974-2936.  In general, fruits and vegetables should take up one  half of your plate, grains should take up one fourth of your plate, and protein should take up one fourth of your plate.  This information is not intended to replace advice given to you by your health care provider. Make sure you discuss any questions you have with your health care provider.  Document Revised: 11/08/2021 Document Reviewed: 11/08/2021  [employer] Patient Education © 2022 [employer]  Calorie Counting for Weight Loss  Calories are units of energy. Your body needs a certain number of calories from food to keep going throughout the day. When you eat or drink more calories than your body needs, your body stores the extra calories mostly as fat. When you eat or drink fewer calories than your body needs, your body burns fat to get the energy it needs.  Calorie counting means keeping track of how many calories you eat and drink each day. Calorie counting can be helpful if you need to lose weight. If you eat fewer calories than your body needs, you should lose weight. Ask your health care provider what a healthy weight is for you.  For calorie counting to work, you will need to eat the right number of calories each day to lose a healthy amount of weight per week. A dietitian can help you figure out how many calories you need in a day and will suggest ways to reach your calorie goal.  A healthy amount of weight to lose each week is usually 1-2 lb (0.5-0.9 kg). This usually means that your daily calorie intake should be reduced by 500-750 calories.  Eating 1,200-1,500 calories a day can help most women lose weight.  Eating 1,500-1,800 calories a day can help most men lose weight.  What do I need to know about calorie counting?  Work with your health care provider or dietitian to determine how many calories you should get each day. To meet your daily calorie goal, you will need to:  Find out how many calories are in each food that you would like to eat. Try to do this before you eat.  Decide how much of the  food you plan to eat.  Keep a food log. Do this by writing down what you ate and how many calories it had.  To successfully lose weight, it is important to balance calorie counting with a healthy lifestyle that includes regular activity.  Where do I find calorie information?  The number of calories in a food can be found on a Nutrition Facts label. If a food does not have a Nutrition Facts label, try to look up the calories online or ask your dietitian for help.  Remember that calories are listed per serving. If you choose to have more than one serving of a food, you will have to multiply the calories per serving by the number of servings you plan to eat. For example, the label on a package of bread might say that a serving size is 1 slice and that there are 90 calories in a serving. If you eat 1 slice, you will have eaten 90 calories. If you eat 2 slices, you will have eaten 180 calories.  How do I keep a food log?  After each time that you eat, record the following in your food log as soon as possible:  What you ate. Be sure to include toppings, sauces, and other extras on the food.  How much you ate. This can be measured in cups, ounces, or number of items.  How many calories were in each food and drink.  The total number of calories in the food you ate.  Keep your food log near you, such as in a pocket-sized notebook or on an rubi or website on your mobile phone. Some programs will calculate calories for you and show you how many calories you have left to meet your daily goal.  What are some portion-control tips?  Know how many calories are in a serving. This will help you know how many servings you can have of a certain food.  Use a measuring cup to measure serving sizes. You could also try weighing out portions on a kitchen scale. With time, you will be able to estimate serving sizes for some foods.  Take time to put servings of different foods on your favorite plates or in your favorite bowls and cups so you  know what a serving looks like.  Try not to eat straight from a food's packaging, such as from a bag or box. Eating straight from the package makes it hard to see how much you are eating and can lead to overeating. Put the amount you would like to eat in a cup or on a plate to make sure you are eating the right portion.  Use smaller plates, glasses, and bowls for smaller portions and to prevent overeating.  Try not to multitask. For example, avoid watching TV or using your computer while eating. If it is time to eat, sit down at a table and enjoy your food. This will help you recognize when you are full. It will also help you be more mindful of what and how much you are eating.  What are tips for following this plan?  Reading food labels  Check the calorie count compared with the serving size. The serving size may be smaller than what you are used to eating.  Check the source of the calories. Try to choose foods that are high in protein, fiber, and vitamins, and low in saturated fat, trans fat, and sodium.  Shopping  Read nutrition labels while you shop. This will help you make healthy decisions about which foods to buy.  Pay attention to nutrition labels for low-fat or fat-free foods. These foods sometimes have the same number of calories or more calories than the full-fat versions. They also often have added sugar, starch, or salt to make up for flavor that was removed with the fat.  Make a grocery list of lower-calorie foods and stick to it.  Cooking  Try to cook your favorite foods in a healthier way. For example, try baking instead of frying.  Use low-fat dairy products.  Meal planning  Use more fruits and vegetables. One-half of your plate should be fruits and vegetables.  Include lean proteins, such as chicken, turkey, and fish.  Lifestyle  Each week, aim to do one of the followin minutes of moderate exercise, such as walking.  75 minutes of vigorous exercise, such as running.  General  information  Know how many calories are in the foods you eat most often. This will help you calculate calorie counts faster.  Find a way of tracking calories that works for you. Get creative. Try different apps or programs if writing down calories does not work for you.  What foods should I eat?    Eat nutritious foods. It is better to have a nutritious, high-calorie food, such as an avocado, than a food with few nutrients, such as a bag of potato chips.  Use your calories on foods and drinks that will fill you up and will not leave you hungry soon after eating.  Examples of foods that fill you up are nuts and nut butters, vegetables, lean proteins, and high-fiber foods such as whole grains. High-fiber foods are foods with more than 5 g of fiber per serving.  Pay attention to calories in drinks. Low-calorie drinks include water and unsweetened drinks.  The items listed above may not be a complete list of foods and beverages you can eat. Contact a dietitian for more information.  What foods should I limit?  Limit foods or drinks that are not good sources of vitamins, minerals, or protein or that are high in unhealthy fats. These include:  Candy.  Other sweets.  Sodas, specialty coffee drinks, alcohol, and juice.  The items listed above may not be a complete list of foods and beverages you should avoid. Contact a dietitian for more information.  How do I count calories when eating out?  Pay attention to portions. Often, portions are much larger when eating out. Try these tips to keep portions smaller:  Consider sharing a meal instead of getting your own.  If you get your own meal, eat only half of it. Before you start eating, ask for a container and put half of your meal into it.  When available, consider ordering smaller portions from the menu instead of full portions.  Pay attention to your food and drink choices. Knowing the way food is cooked and what is included with the meal can help you eat fewer calories.  If  calories are listed on the menu, choose the lower-calorie options.  Choose dishes that include vegetables, fruits, whole grains, low-fat dairy products, and lean proteins.  Choose items that are boiled, broiled, grilled, or steamed. Avoid items that are buttered, battered, fried, or served with cream sauce. Items labeled as crispy are usually fried, unless stated otherwise.  Choose water, low-fat milk, unsweetened iced tea, or other drinks without added sugar. If you want an alcoholic beverage, choose a lower-calorie option, such as a glass of wine or light beer.  Ask for dressings, sauces, and syrups on the side. These are usually high in calories, so you should limit the amount you eat.  If you want a salad, choose a garden salad and ask for grilled meats. Avoid extra toppings such as momin, cheese, or fried items. Ask for the dressing on the side, or ask for olive oil and vinegar or lemon to use as dressing.  Estimate how many servings of a food you are given. Knowing serving sizes will help you be aware of how much food you are eating at restaurants.  Where to find more information  Centers for Disease Control and Prevention: www.cdc.gov  U.S. Department of Agriculture: myplate.gov  Summary  Calorie counting means keeping track of how many calories you eat and drink each day. If you eat fewer calories than your body needs, you should lose weight.  A healthy amount of weight to lose per week is usually 1-2 lb (0.5-0.9 kg). This usually means reducing your daily calorie intake by 500-750 calories.  The number of calories in a food can be found on a Nutrition Facts label. If a food does not have a Nutrition Facts label, try to look up the calories online or ask your dietitian for help.  Use smaller plates, glasses, and bowls for smaller portions and to prevent overeating.  Use your calories on foods and drinks that will fill you up and not leave you hungry shortly after a meal.  This information is not intended to  replace advice given to you by your health care provider. Make sure you discuss any questions you have with your health care provider.  Document Revised: 01/28/2021 Document Reviewed: 01/28/2021  [employer] Patient Education © 2022 [employer]  Exercising to Stay Healthy  To become healthy and stay healthy, it is recommended that you do moderate-intensity and vigorous-intensity exercise. You can tell that you are exercising at a moderate intensity if your heart starts beating faster and you start breathing faster but can still hold a conversation. You can tell that you are exercising at a vigorous intensity if you are breathing much harder and faster and cannot hold a conversation while exercising.  How can exercise benefit me?  Exercising regularly is important. It has many health benefits, such as:  Improving overall fitness, flexibility, and endurance.  Increasing bone density.  Helping with weight control.  Decreasing body fat.  Increasing muscle strength and endurance.  Reducing stress and tension, anxiety, depression, or anger.  Improving overall health.  What guidelines should I follow while exercising?  Before you start a new exercise program, talk with your health care provider.  Do not exercise so much that you hurt yourself, feel dizzy, or get very short of breath.  Wear comfortable clothes and wear shoes with good support.  Drink plenty of water while you exercise to prevent dehydration or heat stroke.  Work out until your breathing and your heartbeat get faster (moderate intensity).  How often should I exercise?  Choose an activity that you enjoy, and set realistic goals. Your health care provider can help you make an activity plan that is individually designed and works best for you.  Exercise regularly as told by your health care provider. This may include:  Doing strength training two times a week, such as:  Lifting weights.  Using resistance bands.  Push-ups.  Sit-ups.  Yoga.  Doing a certain  intensity of exercise for a given amount of time. Choose from these options:  A total of 150 minutes of moderate-intensity exercise every week.  A total of 75 minutes of vigorous-intensity exercise every week.  A mix of moderate-intensity and vigorous-intensity exercise every week.  Children, pregnant women, people who have not exercised regularly, people who are overweight, and older adults may need to talk with a health care provider about what activities are safe to perform. If you have a medical condition, be sure to talk with your health care provider before you start a new exercise program.  What are some exercise ideas?  Moderate-intensity exercise ideas include:  Walking 1 mile (1.6 km) in about 15 minutes.  Biking.  Hiking.  Golfing.  Dancing.  Water aerobics.  Vigorous-intensity exercise ideas include:  Walking 4.5 miles (7.2 km) or more in about 1 hour.  Jogging or running 5 miles (8 km) in about 1 hour.  Biking 10 miles (16.1 km) or more in about 1 hour.  Lap swimming.  Roller-skating or in-line skating.  Cross-country skiing.  Vigorous competitive sports, such as football, basketball, and soccer.  Jumping rope.  Aerobic dancing.  What are some everyday activities that can help me get exercise?  Yard work, such as:  Pushing a .  Raking and bagging leaves.  Washing your car.  Pushing a stroller.  Shoveling snow.  Gardening.  Washing windows or floors.  How can I be more active in my day-to-day activities?  Use stairs instead of an elevator.  Take a walk during your lunch break.  If you drive, park your car farther away from your work or school.  If you take public transportation, get off one stop early and walk the rest of the way.  Stand up or walk around during all of your indoor phone calls.  Get up, stretch, and walk around every 30 minutes throughout the day.  Enjoy exercise with a friend. Support to continue exercising will help you keep a regular routine of activity.  Where to find more  information  You can find more information about exercising to stay healthy from:  [place]. Department of Health and Human Services: www.hhs.gov  Centers for Disease Control and Prevention (CDC): www.cdc.gov  Summary  Exercising regularly is important. It will improve your overall fitness, flexibility, and endurance.  Regular exercise will also improve your overall health. It can help you control your weight, reduce stress, and improve your bone density.  Do not exercise so much that you hurt yourself, feel dizzy, or get very short of breath.  Before you start a new exercise program, talk with your health care provider.  This information is not intended to replace advice given to you by your health care provider. Make sure you discuss any questions you have with your health care provider.  Document Revised: 04/15/2022 Document Reviewed: 04/15/2022  Elsevier Patient Education © 2022 [employer]vier Inc.           Awake/Alert/Cooperative

## 2022-12-07 LAB
ALBUMIN SERPL-MCNC: 4.9 G/DL (ref 3.5–5.2)
ALBUMIN/GLOB SERPL: 2.1 G/DL
ALP SERPL-CCNC: 68 U/L (ref 39–117)
ALT SERPL W P-5'-P-CCNC: 30 U/L (ref 1–33)
ANION GAP SERPL CALCULATED.3IONS-SCNC: 15.2 MMOL/L (ref 5–15)
AST SERPL-CCNC: 25 U/L (ref 1–32)
BILIRUB SERPL-MCNC: 0.3 MG/DL (ref 0–1.2)
BUN SERPL-MCNC: 18 MG/DL (ref 8–23)
BUN/CREAT SERPL: 21.2 (ref 7–25)
CALCIUM SPEC-SCNC: 10 MG/DL (ref 8.6–10.5)
CHLORIDE SERPL-SCNC: 101 MMOL/L (ref 98–107)
CHOLEST SERPL-MCNC: 260 MG/DL (ref 0–200)
CO2 SERPL-SCNC: 21.8 MMOL/L (ref 22–29)
CREAT SERPL-MCNC: 0.85 MG/DL (ref 0.57–1)
EGFRCR SERPLBLD CKD-EPI 2021: 78.1 ML/MIN/1.73
GLOBULIN UR ELPH-MCNC: 2.3 GM/DL
GLUCOSE SERPL-MCNC: 125 MG/DL (ref 65–99)
HDLC SERPL-MCNC: 64 MG/DL (ref 40–60)
LDLC SERPL CALC-MCNC: 166 MG/DL (ref 0–100)
LDLC/HDLC SERPL: 2.54 {RATIO}
MAGNESIUM SERPL-MCNC: 2.1 MG/DL (ref 1.6–2.4)
POTASSIUM SERPL-SCNC: 4.1 MMOL/L (ref 3.5–5.2)
PROT SERPL-MCNC: 7.2 G/DL (ref 6–8.5)
SODIUM SERPL-SCNC: 138 MMOL/L (ref 136–145)
TRIGL SERPL-MCNC: 168 MG/DL (ref 0–150)
TSH SERPL DL<=0.05 MIU/L-ACNC: 2.13 UIU/ML (ref 0.27–4.2)
VLDLC SERPL-MCNC: 30 MG/DL (ref 5–40)

## 2022-12-22 ENCOUNTER — TELEPHONE (OUTPATIENT)
Dept: INTERNAL MEDICINE | Facility: CLINIC | Age: 62
End: 2022-12-22
Payer: COMMERCIAL

## 2022-12-22 DIAGNOSIS — R73.9 HYPERGLYCEMIA: Primary | ICD-10-CM

## 2022-12-22 NOTE — TELEPHONE ENCOUNTER
Call patient please.      Her recent labs showed an elevated fasting blood glucose level.  I would like her to stop by our office, fasting, for a fingerstick repeat fasting blood sugar and a Hemoglobin A1c to check for prediabetes and diabetes.  I have entered the orders.    I will also send a lab letter.

## 2022-12-23 NOTE — TELEPHONE ENCOUNTER
Pt reached, advised of clinical message. Pt stated she is not able to come in because she is out of town until May.   Please review / advise.

## 2023-01-12 DIAGNOSIS — K58.9 IRRITABLE BOWEL SYNDROME WITHOUT DIARRHEA: ICD-10-CM

## 2023-01-12 RX ORDER — FLUOXETINE HYDROCHLORIDE 20 MG/1
20 CAPSULE ORAL DAILY
Qty: 90 CAPSULE | Refills: 3 | Status: SHIPPED | OUTPATIENT
Start: 2023-01-12

## 2023-01-12 NOTE — TELEPHONE ENCOUNTER
Caller: [facility]/pharmacy #[id number] - [place], [place] - [address] - [phone] [facility] [phone] FX    Relationship: Pharmacy    Best call back number: [phone]    Requested Prescriptions:   Requested Prescriptions     Pending Prescriptions Disp Refills   • FLUoxetine (PROzac) 20 MG capsule 90 capsule 3     Sig: Take 1 capsule by mouth Daily.        Pharmacy where request should be sent: [facility]/PHARMACY #[id number] - [place], [place] - [address] [address] - [phone] [facility] [phone] FX     Additional details provided by patient: PATIENT IS OUT OF MEDICATION.    Does the patient have less than a 3 day supply:  [x] Yes  [] No    Would you like a call back once the refill request has been completed: [] Yes [x] No    If the office needs to give you a call back, can they leave a voicemail: [] Yes [x] No    Oracio Cervantes Rep   01/12/23 15:16 EST

## 2023-05-24 ENCOUNTER — HOSPITAL ENCOUNTER (OUTPATIENT)
Dept: CT IMAGING | Facility: HOSPITAL | Age: 63
Discharge: HOME | End: 2023-05-24
Admitting: INTERNAL MEDICINE
Payer: COMMERCIAL

## 2023-05-24 DIAGNOSIS — R91.1 LUNG NODULE, SOLITARY: ICD-10-CM

## 2023-05-24 PROCEDURE — 71250 CT THORAX DX C-: CPT

## 2023-06-06 ENCOUNTER — OFFICE VISIT (OUTPATIENT)
Dept: INTERNAL MEDICINE | Facility: CLINIC | Age: 63
End: 2023-06-06
Payer: COMMERCIAL

## 2023-06-06 VITALS
BODY MASS INDEX: 34.57 KG/M2 | RESPIRATION RATE: 16 BRPM | SYSTOLIC BLOOD PRESSURE: 154 MMHG | WEIGHT: 201.38 LBS | DIASTOLIC BLOOD PRESSURE: 88 MMHG | TEMPERATURE: 97.8 F | HEART RATE: 68 BPM

## 2023-06-06 DIAGNOSIS — E78.49 OTHER HYPERLIPIDEMIA: ICD-10-CM

## 2023-06-06 DIAGNOSIS — K63.5 BENIGN COLONIC POLYP: ICD-10-CM

## 2023-06-06 DIAGNOSIS — R73.9 HYPERGLYCEMIA: ICD-10-CM

## 2023-06-06 DIAGNOSIS — R91.1 LUNG NODULE, SOLITARY: ICD-10-CM

## 2023-06-06 DIAGNOSIS — K21.9 GASTROESOPHAGEAL REFLUX DISEASE, UNSPECIFIED WHETHER ESOPHAGITIS PRESENT: ICD-10-CM

## 2023-06-06 DIAGNOSIS — Z85.3 HISTORY OF LEFT BREAST CANCER: ICD-10-CM

## 2023-06-06 DIAGNOSIS — I10 PRIMARY HYPERTENSION: Primary | ICD-10-CM

## 2023-06-06 PROBLEM — E78.5 HYPERLIPIDEMIA: Status: ACTIVE | Noted: 2023-06-06

## 2023-06-06 LAB
ALBUMIN SERPL-MCNC: 4.5 G/DL (ref 3.5–5.2)
ALBUMIN/GLOB SERPL: 1.7 G/DL
ALP SERPL-CCNC: 58 U/L (ref 39–117)
ALT SERPL W P-5'-P-CCNC: 30 U/L (ref 1–33)
ANION GAP SERPL CALCULATED.3IONS-SCNC: 14.7 MMOL/L (ref 5–15)
AST SERPL-CCNC: 21 U/L (ref 1–32)
BASOPHILS # BLD AUTO: 0.07 10*3/MM3 (ref 0–0.2)
BASOPHILS NFR BLD AUTO: 1.2 % (ref 0–1.5)
BILIRUB SERPL-MCNC: <0.2 MG/DL (ref 0–1.2)
BUN SERPL-MCNC: 17 MG/DL (ref 8–23)
BUN/CREAT SERPL: 21 (ref 7–25)
CALCIUM SPEC-SCNC: 9.5 MG/DL (ref 8.6–10.5)
CHLORIDE SERPL-SCNC: 103 MMOL/L (ref 98–107)
CHOLEST SERPL-MCNC: 262 MG/DL (ref 0–200)
CO2 SERPL-SCNC: 21.3 MMOL/L (ref 22–29)
CREAT SERPL-MCNC: 0.81 MG/DL (ref 0.57–1)
DEPRECATED RDW RBC AUTO: 45.3 FL (ref 37–54)
EGFRCR SERPLBLD CKD-EPI 2021: 82.7 ML/MIN/1.73
EOSINOPHIL # BLD AUTO: 0.22 10*3/MM3 (ref 0–0.4)
EOSINOPHIL NFR BLD AUTO: 3.8 % (ref 0.3–6.2)
ERYTHROCYTE [DISTWIDTH] IN BLOOD BY AUTOMATED COUNT: 13.4 % (ref 12.3–15.4)
EXPIRATION DATE: NORMAL
EXPIRATION DATE: NORMAL
GLOBULIN UR ELPH-MCNC: 2.6 GM/DL
GLUCOSE BLDC GLUCOMTR-MCNC: 125 MG/DL (ref 70–130)
GLUCOSE SERPL-MCNC: 106 MG/DL (ref 65–99)
HBA1C MFR BLD: 5.9 %
HCT VFR BLD AUTO: 38.9 % (ref 34–46.6)
HDLC SERPL-MCNC: 57 MG/DL (ref 40–60)
HGB BLD-MCNC: 12.9 G/DL (ref 12–15.9)
IMM GRANULOCYTES # BLD AUTO: 0.02 10*3/MM3 (ref 0–0.05)
IMM GRANULOCYTES NFR BLD AUTO: 0.3 % (ref 0–0.5)
LDLC SERPL CALC-MCNC: 157 MG/DL (ref 0–100)
LDLC/HDLC SERPL: 2.68 {RATIO}
LYMPHOCYTES # BLD AUTO: 1.23 10*3/MM3 (ref 0.7–3.1)
LYMPHOCYTES NFR BLD AUTO: 21.1 % (ref 19.6–45.3)
Lab: NORMAL
Lab: NORMAL
MCH RBC QN AUTO: 30.7 PG (ref 26.6–33)
MCHC RBC AUTO-ENTMCNC: 33.2 G/DL (ref 31.5–35.7)
MCV RBC AUTO: 92.6 FL (ref 79–97)
MONOCYTES # BLD AUTO: 0.37 10*3/MM3 (ref 0.1–0.9)
MONOCYTES NFR BLD AUTO: 6.3 % (ref 5–12)
NEUTROPHILS NFR BLD AUTO: 3.93 10*3/MM3 (ref 1.7–7)
NEUTROPHILS NFR BLD AUTO: 67.3 % (ref 42.7–76)
NRBC BLD AUTO-RTO: 0 /100 WBC (ref 0–0.2)
PLATELET # BLD AUTO: 305 10*3/MM3 (ref 140–450)
PMV BLD AUTO: 11.1 FL (ref 6–12)
POTASSIUM SERPL-SCNC: 3.9 MMOL/L (ref 3.5–5.2)
PROT SERPL-MCNC: 7.1 G/DL (ref 6–8.5)
RBC # BLD AUTO: 4.2 10*6/MM3 (ref 3.77–5.28)
SODIUM SERPL-SCNC: 139 MMOL/L (ref 136–145)
TRIGL SERPL-MCNC: 261 MG/DL (ref 0–150)
VLDLC SERPL-MCNC: 48 MG/DL (ref 5–40)
WBC NRBC COR # BLD: 5.84 10*3/MM3 (ref 3.4–10.8)

## 2023-06-06 PROCEDURE — 80061 LIPID PANEL: CPT | Performed by: INTERNAL MEDICINE

## 2023-06-06 PROCEDURE — 85025 COMPLETE CBC W/AUTO DIFF WBC: CPT | Performed by: INTERNAL MEDICINE

## 2023-06-06 PROCEDURE — 80053 COMPREHEN METABOLIC PANEL: CPT | Performed by: INTERNAL MEDICINE

## 2023-06-06 NOTE — PROGRESS NOTES
Subjective       Gaby Ratliff is a 62 y.o. female.     Chief Complaint   Patient presents with    Hypertension     6 month follow up       History obtained from the patient.      History of Present Illness       The patient was diagnosed with a right upper lobe Pulmonary Nodule in April 2014, per CT Chest done at [facility].  CT Chest done at [facility] on 7/9/2014 was read as a stable right middle lobe lung nodule.  CT Chest done on 4/19/2017, at [facility] showed the right middle lobe nodule to be slightly smaller than the previous CT.  On 7/31/2019, CT Chest at [facility] showed a right upper lobe 1 cm nodule.  Right middle lobe nodule had decreased in size.  She saw Dr. Clarke Mason.  On 7/20/2020.  CT showed a RUL ground glass nodule, stable x 1 year.  She saw Dr. Mason again on 7/26/2021.  He felt her abnormal CT chest findings were most consistent with chronic Histoplasmosis.  He ordered multiple blood test (fungal antibodies, histoplasmosis antigen, and quantiferon TB), which were negative.  Biopsy has not been done.  PET scan on 8/10/2021 showed the right upper lobe pulmonary nodular focus or cluster of micronodules to be non-hypermetabolic.  CT Chest done 5/19/2022 showed the right middle lobe nodule to be unchanged since 11/18/2021, but not previously evaluated by the PET scan done on 8/10/2021.  The other solid pulmonary nodules were unchanged since 7/31/2019.  The groundglass nodule in the right upper lobe was stable for at least 2 years.  Findings were most consistent with postinfectious/inflammatory nodules and granulomatous disease exposure.  She saw Dr. Mason last on 5/26/2022 and he felt things were stable and recommended following the nodules for a total of 5 years.  Last CT chest on 5/24/2023 showed the right middle lobe lung nodule to be stable since 9/2021.  Other nodular densities within the right lung were stable since at least 7/2020.  There were no new pulmonary  nodules.  Dr. Clarke Mason has retired.  She has an appointment with Dr. Dominik Mason on 6/9/2023.    The patient has Chronic Allergic Rhinitis.  She reports clear rhinorrhea, postnasal drainage, and watery eyes.  She denies chest pain, chest tightness, shortness of breath, cough, hemoptysis, and wheezing.   She she has been seeing an Allergist.  She Flonase, Nasacort, and an over-the-counter allergy pill.  She is currently off Claritin and Singulair.  She has not had allergy shots.  She has also seen ENT.     He patient is followed by Dr. Ortiz for a history of Left Breast Cancer, last appointment 6/21/2022.  Mammogram on 6/14/2022 was category 2.    She denies any breast symptoms.  She has a follow-up appointment scheduled for 6/26/2023.  She states her last Pap was on 7/8/2022, normal.  Mammogram is scheduled for 6/15/2023.     The patient has Chronic Low Back Pain and Hip Pain.   She saw Orthopedics.  She was diagnosed with Osteoarthritis and a pinched nerve.  She is off Robaxin and Meloxicam.  She also takes Glucosamine/Chondroitin and Turmeric. Pain is improved overall.      Primary Care Cardiac Diagnostic Constellation: The patient is here today for a follow-up visit.      Her Hypertension has been overall stable.   Medication: Amlodipine, HCTZ, and Aspirin.  Her Hyperlipidemia was diagnosed in October 2021.    Medication: None (previously declined statin treatment).    Side Effects: None.     Procedures: On 7/16/2019, Cardiac CT scan showed Calcium Score 0.     Interval Events: She has not been checking her blood pressure at home.  On 12/6/2022, LDL was 166, .  Of note, FBS was 125.      Symptoms:  Denies chest pain, dyspnea, MELISSA, orthopnea, PND, palpitations, syncope, lower extremity edema, claudication, lightheadedness, and dizziness.   Associated Symptoms: Weight down 2 pounds in the past 6 months.  No fatigue, headaches, polyuria, polydipsia, arthralgias, myalgias, visual impairment, memory  loss, concentration issues, or focal neurological deficit.      Lifestyle: She consumes a diverse and healthy diet. She walks or does yard work daily, and is active overall.   Tobacco Use: Former smoker.      GERD Follow-up: The patient is being seen for a routine clinic follow-up of Gastroesophageal Reflux Disease, which is stable.   Comorbid Illness: Irritable Bowel Syndrome.  Takes Prozac.  Interval Events: None.  Symptoms:  No abdominal pain, heartburn, acid reflux, nausea, vomiting, hematemesis, dysphagia, odynophagia, hematochezia, melena, early satiety, belching, or bloating.   Associated Symptoms:  No chronic cough, sore throat, hoarseness, or wheezing.   Medications: Omeprazole.        Colonic Polyp Follow-up: The patient is being seen for a routine clinic follow-up of Colon Polyp(s), which is stable.   Pertinent Medical History: Anal Fissure.  Interval Events:  Current diagnosis was determined by Colonoscopy and last 12/13/17, no polyps.  She previously saw Dr. Lozano, who has retired.  She was told to follow-up in 10 years.  Symptoms:   No abdominal pain, diarrhea, constipation, hematochezia, melena, decreased stool caliber, or change in bowel habits.    Medication: Docusate Sodium.     Current Outpatient Medications on File Prior to Visit   Medication Sig Dispense Refill    acetaminophen (TYLENOL) 500 MG tablet Take 2 tablets by mouth 2 (Two) Times a Day As Needed for Mild Pain.      amLODIPine (NORVASC) 10 MG tablet Take 1 tablet by mouth Daily. 90 tablet 3    aspirin 81 MG EC tablet Take 1 tablet by mouth Every Other Day.      calcium citrate-vitamin d (CALCITRATE) 315-250 MG-UNIT tablet tablet Take 2 tablets by mouth Daily.      Docusate Calcium (STOOL SOFTENER PO) 2 capsules Daily.      FLUoxetine (PROzac) 20 MG capsule Take 1 capsule by mouth Daily. 90 capsule 3    fluticasone (FLONASE) 50 MCG/ACT nasal spray into each nostril daily.      GARLIC OIL PO Take  by mouth.       Glucos-Chondroit-Hyaluron-MSM (GLUCOSAMINE CHONDROITIN JOINT PO) 2 tablets Daily.      hydroCHLOROthiazide (HYDRODIURIL) 25 MG tablet Take 1 tablet by mouth Daily. 90 tablet 3    Loratadine 10 MG capsule Take  by mouth.      montelukast (SINGULAIR) 10 MG tablet Take 1 tablet by mouth Daily. 90 tablet 3    Multiple Vitamins-Minerals (MULTI VITAMIN/MINERALS PO) Take  by mouth daily.      OMEGA 3-6-9 FATTY ACIDS PO Take  by mouth.      omeprazole (priLOSEC) 20 MG capsule Take 1 capsule by mouth Every Other Day. 90 capsule 3    Probiotic Product (ALIGN) 4 MG capsule 1 capsule Daily.      Triamcinolone Acetonide (NASACORT AQ NA) into the nostril(s) as directed by provider.      Turmeric 500 MG capsule Take 1 capsule by mouth Daily.       No current facility-administered medications on file prior to visit.       Current outpatient and discharge medications have been reconciled for the patient.  Reviewed by: Eun Mcmanus MD        The following portions of the patient's history were reviewed and updated as appropriate: allergies, current medications, past family history, past medical history, past social history, past surgical history, and problem list.    Review of Systems   Constitutional:  Negative for fatigue and unexpected weight change.   Eyes:  Negative for visual disturbance.   Respiratory:  Negative for cough, shortness of breath and wheezing.    Cardiovascular:  Negative for chest pain, palpitations and leg swelling.        No MELISSA, orthopnea, or claudication.   Gastrointestinal:  Negative for abdominal pain, blood in stool, constipation, diarrhea, nausea and vomiting.        Denies melena.   Endocrine: Negative for polydipsia and polyuria.   Musculoskeletal:  Positive for back pain. Negative for arthralgias and myalgias.   Neurological:  Negative for dizziness, syncope, light-headedness and headaches.        No memory issues.   Psychiatric/Behavioral:  Negative for decreased concentration.        Objective        Blood pressure 154/88, pulse 68, temperature 97.8 °F (36.6 °C), temperature source Infrared, resp. rate 16, weight 91.3 kg (201 lb 6 oz), not currently breastfeeding.  Body mass index is 34.57 kg/m².      Physical Exam  Vitals and nursing note reviewed.   Constitutional:       Appearance: She is well-developed and normal weight.   Neck:      Thyroid: No thyroid mass or thyromegaly.      Vascular: No carotid bruit.   Cardiovascular:      Rate and Rhythm: Normal rate and regular rhythm.      Pulses: Normal pulses.      Heart sounds: Normal heart sounds. No murmur heard.    No friction rub. No gallop.   Pulmonary:      Effort: Pulmonary effort is normal.      Breath sounds: Normal breath sounds.   Abdominal:      General: Bowel sounds are normal. There is no distension or abdominal bruit.      Palpations: Abdomen is soft. There is no hepatomegaly, splenomegaly or mass.      Tenderness: There is no abdominal tenderness.   Musculoskeletal:      Cervical back: Normal range of motion and neck supple.      Right lower leg: No edema.      Left lower leg: No edema.   Neurological:      Mental Status: She is alert.   Psychiatric:         Mood and Affect: Mood normal.     Results for orders placed or performed in visit on 06/06/23   POC Glycosylated Hemoglobin (Hb A1C)    Specimen: Blood   Result Value Ref Range    Hemoglobin A1C 5.9 %    Lot Number [id number]     Expiration Date 2/19/25    POC Glucose    Specimen: Blood   Result Value Ref Range    Glucose 125 70 - 130 mg/dL    Lot Number [id number]     Expiration Date 10/9/23        Assessment / Plan:  Diagnoses and all orders for this visit:    1. Primary hypertension (Primary)  -     CBC & Differential  -     Lipid Panel  -     Comprehensive Metabolic Panel   Continue current medication(s) as noted in the history of present illness.    2. Other hyperlipidemia  -     CBC & Differential  -     Lipid Panel  -     Comprehensive Metabolic Panel   Continue current  medication(s) as noted in the history of present illness.    3. Gastroesophageal reflux disease, unspecified whether esophagitis present   Continue current medication(s) as noted in the history of present illness.    4. Benign colonic polyp   Colonoscopy up-to-date.    5. Hyperglycemia (HgA1C c/w Prediabetes)- new  -     POC Glycosylated Hemoglobin (Hb A1C)  -     POC Glucose    6. Lung nodule, solitary   Follow-up per Pulmonary.    7. History of left breast cancer   Follow-up per Heme-Onc.      I recommended Shingrix (new Shingles vaccine) vaccination at the pharmacy.          Return in about 6 months (around 12/6/2023) for Annual physical, fasting.

## 2023-06-09 ENCOUNTER — OFFICE VISIT (OUTPATIENT)
Dept: PULMONOLOGY | Facility: CLINIC | Age: 63
End: 2023-06-09
Payer: COMMERCIAL

## 2023-06-09 VITALS
SYSTOLIC BLOOD PRESSURE: 146 MMHG | HEIGHT: 64 IN | WEIGHT: 202 LBS | DIASTOLIC BLOOD PRESSURE: 92 MMHG | OXYGEN SATURATION: 96 % | HEART RATE: 90 BPM | TEMPERATURE: 97.7 F | BODY MASS INDEX: 34.49 KG/M2

## 2023-06-09 DIAGNOSIS — J30.89 SEASONAL AND PERENNIAL ALLERGIC RHINITIS: ICD-10-CM

## 2023-06-09 DIAGNOSIS — J30.2 SEASONAL AND PERENNIAL ALLERGIC RHINITIS: ICD-10-CM

## 2023-06-09 DIAGNOSIS — R91.1 LUNG NODULE, SOLITARY: Primary | ICD-10-CM

## 2023-06-09 NOTE — PROGRESS NOTES
PULMONARY  NOTE    Chief Complaint     Incidental groundglass nodule, history of breast cancer, perennial rhinitis    History of Present Illness     62-year-old female returns today for follow-up  She was last seen by ALLEN Decker in May 2022    She has a minimal smoking history, none since high school and college  No past history of known lung disease    She has a history of breast cancer treated with a lumpectomy in 2011  She did not require adjuvant chemo or radiation therapy    She had a CT scan of her chest in 2021 which revealed a new groundglass opacity  A PET scan done at that time revealed no abnormal hypermetabolic activity  The plan is been to follow with serial chest imaging, the most recent as noted below    Primary complaint is just perennial rhinitis symptoms for which she uses a variety of medications including saline nasal rinses    Patient Active Problem List   Diagnosis    History of left breast cancer    Lung nodule, solitary - Ground Glass    Allergic rhinitis    Anemia    Benign colonic polyp    Gastroesophageal reflux disease    Hypertension    Irritable bowel syndrome    Menopausal state    Sigmoid diverticulosis    Internal hemorrhoids    Hyperlipidemia    Seasonal and perennial allergic rhinitis     Allergies   Allergen Reactions    Penicillins Rash    Tamoxifen Other (See Comments)     Feeling hateful and mean     Bupropion GI Intolerance    Lisinopril Cough    Levofloxacin Unknown - Low Severity    Valsartan Headache       Current Outpatient Medications:     acetaminophen (TYLENOL) 500 MG tablet, Take 2 tablets by mouth 2 (Two) Times a Day As Needed for Mild Pain., Disp: , Rfl:     amLODIPine (NORVASC) 10 MG tablet, Take 1 tablet by mouth Daily., Disp: 90 tablet, Rfl: 3    aspirin 81 MG EC tablet, Take 1 tablet by mouth Every Other Day., Disp: , Rfl:     calcium citrate-vitamin d (CALCITRATE) 315-250 MG-UNIT tablet tablet, Take 2 tablets by mouth Daily., Disp: , Rfl:      Docusate Calcium (STOOL SOFTENER PO), 2 capsules Daily., Disp: , Rfl:     FLUoxetine (PROzac) 20 MG capsule, Take 1 capsule by mouth Daily., Disp: 90 capsule, Rfl: 3    fluticasone (FLONASE) 50 MCG/ACT nasal spray, into each nostril daily., Disp: , Rfl:     GARLIC OIL PO, Take  by mouth., Disp: , Rfl:     Glucos-Chondroit-Hyaluron-MSM (GLUCOSAMINE CHONDROITIN JOINT PO), 2 tablets Daily., Disp: , Rfl:     hydroCHLOROthiazide (HYDRODIURIL) 25 MG tablet, Take 1 tablet by mouth Daily., Disp: 90 tablet, Rfl: 3    Loratadine 10 MG capsule, Take  by mouth., Disp: , Rfl:     montelukast (SINGULAIR) 10 MG tablet, Take 1 tablet by mouth Daily., Disp: 90 tablet, Rfl: 3    Multiple Vitamins-Minerals (MULTI VITAMIN/MINERALS PO), Take  by mouth daily., Disp: , Rfl:     OMEGA 3-6-9 FATTY ACIDS PO, Take  by mouth., Disp: , Rfl:     omeprazole (priLOSEC) 20 MG capsule, Take 1 capsule by mouth Every Other Day., Disp: 90 capsule, Rfl: 3    Probiotic Product (ALIGN) 4 MG capsule, 1 capsule Daily., Disp: , Rfl:     Triamcinolone Acetonide (NASACORT AQ NA), into the nostril(s) as directed by provider., Disp: , Rfl:     Turmeric 500 MG capsule, Take 1 capsule by mouth Daily., Disp: , Rfl:   MEDICATION LIST AND ALLERGIES REVIEWED.    Family History   Problem Relation Age of Onset    Coronary artery disease Mother         50's    Diabetes Mother     Hyperlipidemia Mother     Hypertension Mother     Coronary artery disease Father         30's    Bipolar disorder Sister     Diabetes Sister     Hypertension Sister     Diabetes Sister     Hypertension Sister      Social History     Tobacco Use    Smoking status: Former     Packs/day: 0.50     Years: 5.00     Pack years: 2.50     Types: Cigarettes     Quit date:      Years since quittin.4    Smokeless tobacco: Never    Tobacco comments:     High school & college   Vaping Use    Vaping Use: Never used   Substance Use Topics    Alcohol use: Yes     Alcohol/week: 14.0 standard drinks      "Types: 14 Glasses of wine per week     Comment: 1-3 glasses of wine daily    Drug use: No     Social History     Social History Narrative    Not on file     FAMILY AND SOCIAL HISTORY REVIEWED.    Review of Systems  IF PRESENT REFER TO SCANNED ROS SHEET FROM SAME DATE  OTHERWISE ROS OBTAINED AND NON-CONTRIBUTORY OVER HPI.    /92   Pulse 90   Temp 97.7 °F (36.5 °C)   Ht 162.6 cm (64\")   Wt 91.6 kg (202 lb)   SpO2 96% Comment: resting, room air  BMI 34.67 kg/m²   Physical Exam  Vitals and nursing note reviewed.   Constitutional:       General: She is not in acute distress.     Appearance: She is well-developed. She is not diaphoretic.   HENT:      Head: Normocephalic and atraumatic.   Neck:      Thyroid: No thyromegaly.   Cardiovascular:      Rate and Rhythm: Normal rate and regular rhythm.      Heart sounds: Normal heart sounds. No murmur heard.  Pulmonary:      Effort: Pulmonary effort is normal.      Breath sounds: Normal breath sounds. No stridor.   Lymphadenopathy:      Cervical: No cervical adenopathy.      Upper Body:      Right upper body: No supraclavicular or epitrochlear adenopathy.      Left upper body: No supraclavicular or epitrochlear adenopathy.   Skin:     General: Skin is warm and dry.   Neurological:      Mental Status: She is alert and oriented to person, place, and time.   Psychiatric:         Behavior: Behavior normal.       Results     CT scan of the chest from 5/24/2023 reviewed on PACS  Stability in the previously noted groundglass opacity, stable back to August 2021    Immunization History   Administered Date(s) Administered    COVID-19 (PFIZER) BIVALENT 12+YRS 12/06/2022    COVID-19 (PFIZER) Purple Cap Monovalent 04/04/2021, 04/26/2021, 11/19/2021    Covid-19 (Pfizer) Gray Cap Monovalent 06/07/2022    Flu Vaccine Quad PF >36MO 10/04/2016, 10/07/2017    FluLaval/Fluzone >6mos 12/19/2018, 10/14/2019, 09/25/2020, 10/14/2021    FluMist 2-49yrs 01/10/2016, 10/07/2017    Fluzone Quad " >6mos (Multi-dose) 11/12/2022    Hepatitis A 12/19/2018, 06/28/2019    Tdap 03/21/2011, 10/14/2021     Problem List       ICD-10-CM ICD-9-CM   1. Lung nodule, solitary - Ground Glass  R91.1 793.11   2. Seasonal and perennial allergic rhinitis  J30.89 477.9    J30.2        Discussion     We reviewed her chest imaging stability in the previously noted groundglass nodule dating back to 2021, nearly 2 years  We reviewed Fleischner Society guidelines for incidentally identified groundglass nodules  Our plan is to continue to follow with serial chest imaging for total of 5 years  I will see her back in a year with a repeat CT scan of the chest    We discussed therapy for perennial rhinitis symptoms  Including OTC medicines as well as prescription medicine such as montelukast and ipratropium bromide nasal spray  She will continue to use her same medical regimen    I will plan to see her back in a year or earlier if there are any problems in the meantime    Moderate level of Medical Decision Making complexity based on 1 undiagnosed new problem or 2 stable chronic conditions, independent interpretation of tests, and/or prescription drug management     Dominik Mason MD  Note electronically signed    CC: Eun Mcmanus MD

## 2023-06-12 DIAGNOSIS — R91.1 LUNG NODULE, SOLITARY: Primary | ICD-10-CM

## 2023-06-16 DIAGNOSIS — I10 PRIMARY HYPERTENSION: ICD-10-CM

## 2023-06-16 RX ORDER — AMLODIPINE BESYLATE 10 MG/1
10 TABLET ORAL DAILY
Qty: 30 TABLET | Refills: 1 | Status: SHIPPED | OUTPATIENT
Start: 2023-06-16

## 2023-06-19 PROBLEM — R73.03 PREDIABETES: Status: ACTIVE | Noted: 2023-06-19

## 2023-08-22 ENCOUNTER — TELEPHONE (OUTPATIENT)
Dept: INTERNAL MEDICINE | Facility: CLINIC | Age: 63
End: 2023-08-22
Payer: COMMERCIAL

## 2023-08-22 NOTE — TELEPHONE ENCOUNTER
Patient received 5th COVID Booster in December of 2022. She is going on a cruise in 10 days and was concerned about the spike in COVID cases. She tried to get the COVID Biv shot at her pharmacy and they stated it was the same vaccine she had back in December and advised her to wait until the new booster was released in September.   She wants to know if the vaccine she received back in December will still be effective or can they receive the same Bivalent shot within the same year?

## 2023-08-22 NOTE — TELEPHONE ENCOUNTER
Caller: James Ratliff    Relationship: Self    Best call back number: [phone]     What is the best time to reach you: ANYTIME    Who are you requesting to speak with (clinical staff, provider,  specific staff member): CLINICAL    Do you know the name of the person who called: JAMES    What was the call regarding: HAS QUESTIONS ABOUT COVID VACCINE PERTAINING TO AN UPCOMING CRUISE    Is it okay if the provider responds through MyChart: NO

## 2023-11-20 DIAGNOSIS — K58.9 IRRITABLE BOWEL SYNDROME WITHOUT DIARRHEA: ICD-10-CM

## 2023-11-20 DIAGNOSIS — J30.2 SEASONAL ALLERGIC RHINITIS, UNSPECIFIED TRIGGER: ICD-10-CM

## 2023-11-20 DIAGNOSIS — I10 PRIMARY HYPERTENSION: ICD-10-CM

## 2023-11-20 RX ORDER — MONTELUKAST SODIUM 10 MG/1
10 TABLET ORAL DAILY
Qty: 30 TABLET | Refills: 11 | Status: SHIPPED | OUTPATIENT
Start: 2023-11-20

## 2023-11-20 RX ORDER — FLUOXETINE HYDROCHLORIDE 20 MG/1
20 CAPSULE ORAL DAILY
Qty: 30 CAPSULE | Refills: 11 | Status: SHIPPED | OUTPATIENT
Start: 2023-11-20

## 2023-11-20 RX ORDER — AMLODIPINE BESYLATE 10 MG/1
10 TABLET ORAL DAILY
Qty: 90 TABLET | Refills: 1 | Status: SHIPPED | OUTPATIENT
Start: 2023-11-20 | End: 2023-11-22 | Stop reason: SDUPTHER

## 2023-11-22 DIAGNOSIS — I10 PRIMARY HYPERTENSION: ICD-10-CM

## 2023-11-22 RX ORDER — AMLODIPINE BESYLATE 10 MG/1
10 TABLET ORAL DAILY
Qty: 90 TABLET | Refills: 1 | Status: SHIPPED | OUTPATIENT
Start: 2023-11-22

## 2023-12-15 ENCOUNTER — OFFICE VISIT (OUTPATIENT)
Dept: INTERNAL MEDICINE | Facility: CLINIC | Age: 63
End: 2023-12-15
Payer: COMMERCIAL

## 2023-12-15 VITALS
RESPIRATION RATE: 16 BRPM | HEART RATE: 86 BPM | HEIGHT: 64 IN | WEIGHT: 208 LBS | SYSTOLIC BLOOD PRESSURE: 154 MMHG | DIASTOLIC BLOOD PRESSURE: 92 MMHG | TEMPERATURE: 97.8 F | BODY MASS INDEX: 35.51 KG/M2

## 2023-12-15 DIAGNOSIS — E78.5 DYSLIPIDEMIA: ICD-10-CM

## 2023-12-15 DIAGNOSIS — Z23 NEED FOR IMMUNIZATION AGAINST INFLUENZA: ICD-10-CM

## 2023-12-15 DIAGNOSIS — R73.03 PREDIABETES: ICD-10-CM

## 2023-12-15 DIAGNOSIS — E66.01 CLASS 2 SEVERE OBESITY WITH SERIOUS COMORBIDITY AND BODY MASS INDEX (BMI) OF 35.0 TO 35.9 IN ADULT, UNSPECIFIED OBESITY TYPE: ICD-10-CM

## 2023-12-15 DIAGNOSIS — Z00.00 ENCOUNTER FOR HEALTH MAINTENANCE EXAMINATION IN ADULT: Primary | ICD-10-CM

## 2023-12-15 DIAGNOSIS — Z79.899 HIGH RISK MEDICATION USE: ICD-10-CM

## 2023-12-15 DIAGNOSIS — K63.5 BENIGN COLONIC POLYP: ICD-10-CM

## 2023-12-15 DIAGNOSIS — I10 PRIMARY HYPERTENSION: ICD-10-CM

## 2023-12-15 DIAGNOSIS — Z78.0 MENOPAUSE: ICD-10-CM

## 2023-12-15 DIAGNOSIS — K21.9 GASTROESOPHAGEAL REFLUX DISEASE, UNSPECIFIED WHETHER ESOPHAGITIS PRESENT: ICD-10-CM

## 2023-12-15 LAB
ALBUMIN SERPL-MCNC: 4.9 G/DL (ref 3.5–5.2)
ALBUMIN/GLOB SERPL: 2 G/DL
ALP SERPL-CCNC: 66 U/L (ref 39–117)
ALT SERPL W P-5'-P-CCNC: 32 U/L (ref 1–33)
ANION GAP SERPL CALCULATED.3IONS-SCNC: 14.2 MMOL/L (ref 5–15)
AST SERPL-CCNC: 27 U/L (ref 1–32)
BASOPHILS # BLD AUTO: 0.08 10*3/MM3 (ref 0–0.2)
BASOPHILS NFR BLD AUTO: 1.5 % (ref 0–1.5)
BILIRUB SERPL-MCNC: 0.3 MG/DL (ref 0–1.2)
BUN SERPL-MCNC: 19 MG/DL (ref 8–23)
BUN/CREAT SERPL: 22.1 (ref 7–25)
CALCIUM SPEC-SCNC: 9.9 MG/DL (ref 8.6–10.5)
CHLORIDE SERPL-SCNC: 104 MMOL/L (ref 98–107)
CHOLEST SERPL-MCNC: 251 MG/DL (ref 0–200)
CO2 SERPL-SCNC: 27.8 MMOL/L (ref 22–29)
CREAT SERPL-MCNC: 0.86 MG/DL (ref 0.57–1)
DEPRECATED RDW RBC AUTO: 43.2 FL (ref 37–54)
EGFRCR SERPLBLD CKD-EPI 2021: 76.5 ML/MIN/1.73
EOSINOPHIL # BLD AUTO: 0.27 10*3/MM3 (ref 0–0.4)
EOSINOPHIL NFR BLD AUTO: 5 % (ref 0.3–6.2)
ERYTHROCYTE [DISTWIDTH] IN BLOOD BY AUTOMATED COUNT: 13 % (ref 12.3–15.4)
GLOBULIN UR ELPH-MCNC: 2.5 GM/DL
GLUCOSE SERPL-MCNC: 130 MG/DL (ref 65–99)
HBA1C MFR BLD: 5.9 % (ref 4.8–5.6)
HCT VFR BLD AUTO: 39 % (ref 34–46.6)
HDLC SERPL-MCNC: 66 MG/DL (ref 40–60)
HGB BLD-MCNC: 12.5 G/DL (ref 12–15.9)
IMM GRANULOCYTES # BLD AUTO: 0.02 10*3/MM3 (ref 0–0.05)
IMM GRANULOCYTES NFR BLD AUTO: 0.4 % (ref 0–0.5)
LDLC SERPL CALC-MCNC: 151 MG/DL (ref 0–100)
LDLC/HDLC SERPL: 2.23 {RATIO}
LYMPHOCYTES # BLD AUTO: 1.41 10*3/MM3 (ref 0.7–3.1)
LYMPHOCYTES NFR BLD AUTO: 25.9 % (ref 19.6–45.3)
MAGNESIUM SERPL-MCNC: 2.2 MG/DL (ref 1.6–2.4)
MCH RBC QN AUTO: 29.6 PG (ref 26.6–33)
MCHC RBC AUTO-ENTMCNC: 32.1 G/DL (ref 31.5–35.7)
MCV RBC AUTO: 92.2 FL (ref 79–97)
MONOCYTES # BLD AUTO: 0.44 10*3/MM3 (ref 0.1–0.9)
MONOCYTES NFR BLD AUTO: 8.1 % (ref 5–12)
NEUTROPHILS NFR BLD AUTO: 3.23 10*3/MM3 (ref 1.7–7)
NEUTROPHILS NFR BLD AUTO: 59.1 % (ref 42.7–76)
NRBC BLD AUTO-RTO: 0 /100 WBC (ref 0–0.2)
PLATELET # BLD AUTO: 337 10*3/MM3 (ref 140–450)
PMV BLD AUTO: 10.8 FL (ref 6–12)
POTASSIUM SERPL-SCNC: 5.3 MMOL/L (ref 3.5–5.2)
PROT SERPL-MCNC: 7.4 G/DL (ref 6–8.5)
RBC # BLD AUTO: 4.23 10*6/MM3 (ref 3.77–5.28)
SODIUM SERPL-SCNC: 146 MMOL/L (ref 136–145)
TRIGL SERPL-MCNC: 190 MG/DL (ref 0–150)
TSH SERPL DL<=0.05 MIU/L-ACNC: 2.28 UIU/ML (ref 0.27–4.2)
VLDLC SERPL-MCNC: 34 MG/DL (ref 5–40)
WBC NRBC COR # BLD AUTO: 5.45 10*3/MM3 (ref 3.4–10.8)

## 2023-12-15 PROCEDURE — 80061 LIPID PANEL: CPT | Performed by: INTERNAL MEDICINE

## 2023-12-15 PROCEDURE — 83036 HEMOGLOBIN GLYCOSYLATED A1C: CPT | Performed by: INTERNAL MEDICINE

## 2023-12-15 PROCEDURE — 83735 ASSAY OF MAGNESIUM: CPT | Performed by: INTERNAL MEDICINE

## 2023-12-15 PROCEDURE — 80050 GENERAL HEALTH PANEL: CPT | Performed by: INTERNAL MEDICINE

## 2023-12-15 RX ORDER — FAMOTIDINE 20 MG/1
20 TABLET, FILM COATED ORAL 2 TIMES DAILY
Qty: 60 TABLET | Refills: 5 | Status: SHIPPED | OUTPATIENT
Start: 2023-12-15

## 2023-12-15 NOTE — PATIENT INSTRUCTIONS
I recommend Shingrix (Shingles vaccine), COVID bivalent vaccine, and RSV vaccine at the pharmacy.    Health Maintenance for Postmenopausal Women  Menopause is a normal process in which your ability to get pregnant comes to an end. This process happens slowly over many months or years, usually between the ages of 48 and 55. Menopause is complete when you have missed your menstrual period for 12 months.  It is important to talk with your health care provider about some of the most common conditions that affect women after menopause (postmenopausal women). These include heart disease, cancer, and bone loss (osteoporosis). Adopting a healthy lifestyle and getting preventive care can help to promote your health and wellness. The actions you take can also lower your chances of developing some of these common conditions.  What are the signs and symptoms of menopause?  During menopause, you may have the following symptoms:  Hot flashes. These can be moderate or severe.  Night sweats.  Decrease in sex drive.  Mood swings.  Headaches.  Tiredness (fatigue).  Irritability.  Memory problems.  Problems falling asleep or staying asleep.  Talk with your health care provider about treatment options for your symptoms.  Do I need hormone replacement therapy?  Hormone replacement therapy is effective in treating symptoms that are caused by menopause, such as hot flashes and night sweats.  Hormone replacement carries certain risks, especially as you become older. If you are thinking about using estrogen or estrogen with progestin, discuss the benefits and risks with your health care provider.  How can I reduce my risk for heart disease and stroke?  The risk of heart disease, heart attack, and stroke increases as you age. One of the causes may be a change in the body's hormones during menopause. This can affect how your body uses dietary fats, triglycerides, and cholesterol. Heart attack and stroke are medical emergencies. There are many  things that you can do to help prevent heart disease and stroke.  Watch your blood pressure  High blood pressure causes heart disease and increases the risk of stroke. This is more likely to develop in people who have high blood pressure readings or are overweight.  Have your blood pressure checked:  Every 3-5 years if you are 18-39 years of age.  Every year if you are 40 years old or older.  Eat a healthy diet    Eat a diet that includes plenty of vegetables, fruits, low-fat dairy products, and lean protein.  Do not eat a lot of foods that are high in solid fats, added sugars, or sodium.  Get regular exercise  Get regular exercise. This is one of the most important things you can do for your health. Most adults should:  Try to exercise for at least 150 minutes each week. The exercise should increase your heart rate and make you sweat (moderate-intensity exercise).  Try to do strengthening exercises at least twice each week. Do these in addition to the moderate-intensity exercise.  Spend less time sitting. Even light physical activity can be beneficial.  Other tips  Work with your health care provider to achieve or maintain a healthy weight.  Do not use any products that contain nicotine or tobacco. These products include cigarettes, chewing tobacco, and vaping devices, such as e-cigarettes. If you need help quitting, ask your health care provider.  Know your numbers. Ask your health care provider to check your cholesterol and your blood sugar (glucose). Continue to have your blood tested as directed by your health care provider.  Do I need screening for cancer?  Depending on your health history and family history, you may need to have cancer screenings at different stages of your life. This may include screening for:  Breast cancer.  Cervical cancer.  Lung cancer.  Colorectal cancer.  What is my risk for osteoporosis?  After menopause, you may be at increased risk for osteoporosis. Osteoporosis is a condition in  which bone destruction happens more quickly than new bone creation. To help prevent osteoporosis or the bone fractures that can happen because of osteoporosis, you may take the following actions:  If you are 19-50 years old, get at least 1,000 mg of calcium and at least 600 international units (IU) of vitamin D per day.  If you are older than age 50 but younger than age 70, get at least 1,200 mg of calcium and at least 600 international units (IU) of vitamin D per day.  If you are older than age 70, get at least 1,200 mg of calcium and at least 800 international units (IU) of vitamin D per day.  Smoking and drinking excessive alcohol increase the risk of osteoporosis. Eat foods that are rich in calcium and vitamin D, and do weight-bearing exercises several times each week as directed by your health care provider.  How does menopause affect my mental health?  Depression may occur at any age, but it is more common as you become older. Common symptoms of depression include:  Feeling depressed.  Changes in sleep patterns.  Changes in appetite or eating patterns.  Feeling an overall lack of motivation or enjoyment of activities that you previously enjoyed.  Frequent crying spells.  Talk with your health care provider if you think that you are experiencing any of these symptoms.  General instructions  See your health care provider for regular wellness exams and vaccines. This may include:  Scheduling regular health, dental, and eye exams.  Getting and maintaining your vaccines. These include:  Influenza vaccine. Get this vaccine each year before the flu season begins.  Pneumonia vaccine.  Shingles vaccine.  Tetanus, diphtheria, and pertussis (Tdap) booster vaccine.  Your health care provider may also recommend other immunizations.  Tell your health care provider if you have ever been abused or do not feel safe at home.  Summary  Menopause is a normal process in which your ability to get pregnant comes to an end.  This  condition causes hot flashes, night sweats, decreased interest in sex, mood swings, headaches, or lack of sleep.  Treatment for this condition may include hormone replacement therapy.  Take actions to keep yourself healthy, including exercising regularly, eating a healthy diet, watching your weight, and checking your blood pressure and blood sugar levels.  Get screened for cancer and depression. Make sure that you are up to date with all your vaccines.  This information is not intended to replace advice given to you by your health care provider. Make sure you discuss any questions you have with your health care provider.  Document Revised: 05/09/2022 Document Reviewed: 05/09/2022  [email or web address] Patient Education © 2023 [email or web address]      MyPlate from [employer]    MyPlate is an outline of a general healthy diet based on the Dietary Guidelines for Americans, 6611-8576, from the [place] Department of Agriculture (USDA). It sets guidelines for how much food you should eat from each food group based on your age, sex, and level of physical activity.  What are tips for following MyPlate?  To follow MyPlate recommendations:  Eat a wide variety of fruits and vegetables, grains, and protein foods.  Serve smaller portions and eat less food throughout the day.  Limit portion sizes to avoid overeating.  Enjoy your food.  Get at least 150 minutes of exercise every week. This is about 30 minutes each day, 5 or more days per week.  It can be difficult to have every meal look like MyPlate. Think about MyPlate as eating guidelines for an entire day, rather than each individual meal.  Fruits and vegetables  Make one half of your plate fruits and vegetables.  Eat many different colors of fruits and vegetables each day.  For a 2,000-calorie daily food plan, eat:  2½ cups of vegetables every day.  2 cups of fruit every day.  1 cup is equal to:  1 cup raw or cooked vegetables.  1 cup raw fruit.  1 medium-sized orange, apple, or banana.  1 cup 100%  fruit or vegetable juice.  2 cups raw leafy greens, such as lettuce, spinach, or kale.  ½ cup dried fruit.  Grains  One fourth of your plate should be grains.  Make at least half of the grains you eat each day whole grains.  For a 2,000-calorie daily food plan, eat 6 oz of grains every day.  1 oz is equal to:  1 slice bread.  1 cup cereal.  ½ cup cooked rice, cereal, or pasta.  Protein  One fourth of your plate should be protein.  Eat a wide variety of protein foods, including meat, poultry, fish, eggs, beans, nuts, and tofu.  For a 2,000-calorie daily food plan, eat 5½ oz of protein every day.  1 oz is equal to:  1 oz meat, poultry, or fish.  ¼ cup cooked beans.  1 egg.  ½ oz nuts or seeds.  1 Tbsp peanut butter.  Dairy  Drink fat-free or low-fat (1%) milk.  Eat or drink dairy as a side to meals.  For a 2,000-calorie daily food plan, eat or drink 3 cups of dairy every day.  1 cup is equal to:  1 cup milk, yogurt, cottage cheese, or soy milk (soy beverage).  2 oz processed cheese.  1½ oz natural cheese.  Fats, oils, salt, and sugars  Only small amounts of oils are recommended.  Avoid foods that are high in calories and low in nutritional value (empty calories), like foods high in fat or added sugars.  Choose foods that are low in salt (sodium). Choose foods that have less than 140 milligrams (mg) of sodium per serving.  Drink water instead of sugary drinks. Drink enough fluid to keep your urine pale yellow.  Where to find support  Work with your health care provider or a dietitian to develop a customized eating plan that is right for you.  Download an rubi (mobile application) to help you track your daily food intake.  Where to find more information  USDA: ChooseMyPlate.gov  Summary  MyPlate is a general guideline for healthy eating from the USDA. It is based on the Dietary Guidelines for Americans, 0672-5128.  In general, fruits and vegetables should take up one half of your plate, grains should take up one fourth  of your plate, and protein should take up one fourth of your plate.  This information is not intended to replace advice given to you by your health care provider. Make sure you discuss any questions you have with your health care provider.  Document Revised: 11/08/2021 Document Reviewed: 11/08/2021  [employer] Patient Education © 2023 [employer]      Exercising to Stay Healthy  To become healthy and stay healthy, it is recommended that you do moderate-intensity and vigorous-intensity exercise. You can tell that you are exercising at a moderate intensity if your heart starts beating faster and you start breathing faster but can still hold a conversation. You can tell that you are exercising at a vigorous intensity if you are breathing much harder and faster and cannot hold a conversation while exercising.  How can exercise benefit me?  Exercising regularly is important. It has many health benefits, such as:  Improving overall fitness, flexibility, and endurance.  Increasing bone density.  Helping with weight control.  Decreasing body fat.  Increasing muscle strength and endurance.  Reducing stress and tension, anxiety, depression, or anger.  Improving overall health.  What guidelines should I follow while exercising?  Before you start a new exercise program, talk with your health care provider.  Do not exercise so much that you hurt yourself, feel dizzy, or get very short of breath.  Wear comfortable clothes and wear shoes with good support.  Drink plenty of water while you exercise to prevent dehydration or heat stroke.  Work out until your breathing and your heartbeat get faster (moderate intensity).  How often should I exercise?  Choose an activity that you enjoy, and set realistic goals. Your health care provider can help you make an activity plan that is individually designed and works best for you.  Exercise regularly as told by your health care provider. This may include:  Doing strength training two times a  week, such as:  Lifting weights.  Using resistance bands.  Push-ups.  Sit-ups.  Yoga.  Doing a certain intensity of exercise for a given amount of time. Choose from these options:  A total of 150 minutes of moderate-intensity exercise every week.  A total of 75 minutes of vigorous-intensity exercise every week.  A mix of moderate-intensity and vigorous-intensity exercise every week.  Children, pregnant women, people who have not exercised regularly, people who are overweight, and older adults may need to talk with a health care provider about what activities are safe to perform. If you have a medical condition, be sure to talk with your health care provider before you start a new exercise program.  What are some exercise ideas?  Moderate-intensity exercise ideas include:  Walking 1 mile (1.6 km) in about 15 minutes.  Biking.  Hiking.  Golfing.  Dancing.  Water aerobics.  Vigorous-intensity exercise ideas include:  Walking 4.5 miles (7.2 km) or more in about 1 hour.  Jogging or running 5 miles (8 km) in about 1 hour.  Biking 10 miles (16.1 km) or more in about 1 hour.  Lap swimming.  Roller-skating or in-line skating.  Cross-country skiing.  Vigorous competitive sports, such as football, basketball, and soccer.  Jumping rope.  Aerobic dancing.  What are some everyday activities that can help me get exercise?  Yard work, such as:  Pushing a .  Raking and bagging leaves.  Washing your car.  Pushing a stroller.  Shoveling snow.  Gardening.  Washing windows or floors.  How can I be more active in my day-to-day activities?  Use stairs instead of an elevator.  Take a walk during your lunch break.  If you drive, park your car farther away from your work or school.  If you take public transportation, get off one stop early and walk the rest of the way.  Stand up or walk around during all of your indoor phone calls.  Get up, stretch, and walk around every 30 minutes throughout the day.  Enjoy exercise with a friend.  Support to continue exercising will help you keep a regular routine of activity.  Where to find more information  You can find more information about exercising to stay healthy from:  [place] Department of Health and Human Services: www.hhs.gov  Centers for Disease Control and Prevention (CDC): www.cdc.gov  Summary  Exercising regularly is important. It will improve your overall fitness, flexibility, and endurance.  Regular exercise will also improve your overall health. It can help you control your weight, reduce stress, and improve your bone density.  Do not exercise so much that you hurt yourself, feel dizzy, or get very short of breath.  Before you start a new exercise program, talk with your health care provider.  This information is not intended to replace advice given to you by your health care provider. Make sure you discuss any questions you have with your health care provider.  Document Revised: 04/15/2022 Document Reviewed: 04/15/2022  [employer] Patient Education © 2023 [employer]

## 2023-12-15 NOTE — PROGRESS NOTES
Subjective     Chief Complaint:  Physical Exam.    History of Present Illness    History obtained from the patient.      The patient was diagnosed with a right upper lobe Pulmonary Nodule in April 2014, per CT Chest done at [facility].  CT Chest done at [facility] on 7/9/2014 was read as a stable right middle lobe lung nodule.  CT Chest done on 4/19/2017, at [facility] showed the right middle lobe nodule to be slightly smaller than the previous CT.  On 7/31/2019, CT Chest at [facility] showed a right upper lobe 1 cm nodule.  Right middle lobe nodule had decreased in size.  She saw Dr. Clarke Mason.  On 7/20/2020,  CT showed a RUL ground glass nodule, stable x 1 year.  She saw Dr. Mason again on 7/26/2021.  He felt her abnormal CT chest findings were most consistent with chronic Histoplasmosis.  He ordered multiple blood test (fungal antibodies, histoplasmosis antigen, and quantiferon TB), which were negative.  Biopsy has not been done.  PET scan on 8/10/2021 showed the right upper lobe pulmonary nodular focus or cluster of micronodules to be non-hypermetabolic.  CT Chest done 5/19/2022 showed the right middle lobe nodule to be unchanged since 11/18/2021, but not previously evaluated by the PET scan done on 8/10/2021.  The other solid pulmonary nodules were unchanged since 7/31/2019.  The groundglass nodule in the right upper lobe was stable for at least 2 years.  Findings were most consistent with postinfectious/inflammatory nodules and granulomatous disease exposure.  She saw Dr. Mason last on 5/26/2022 and he felt things were stable and recommended following the nodules for a total of 5 years.  Last CT chest on 5/24/2023 showed the right middle lobe lung nodule to be stable since 9/2021.  Other nodular densities within the right lung were stable since at least 7/2020.  There were no new pulmonary nodules.  Dr. Clarke Mason has retired.  She saw Dr. Dominik Mason on 6/9/2023.  She is  to follow-up in 1 year     The patient has Chronic Allergic Rhinitis.  Symptoms are overall stable on Flonase, Nasacort, Claritin,  and Singulair.  She reports chronic postnasal drainage.  She denies chest pain, chest tightness, shortness of breath, cough, hemoptysis, and wheezing.   She sees an Allergist.   She has also seen ENT.     He patient is followed by Dr. Ortiz for a history of Left Breast Cancer, last appointment 7/18/23.  Mammogram on 6/22/23 was category 2.   She denies any breast symptoms.      The patient has Chronic Low Back Pain and Hip Pain.  She saw Orthopedics.  She was diagnosed with Osteoarthritis and a pinched nerve.  She is off NSAIDs.  She also takes Glucosamine/Chondroitin and Turmeric.  She has also started a Magnesium supplement daily.  Pain is stable.        Cardiac Follow-up: The patient is here today for a follow-up visit.      Her Hypertension has been stable.   Medication: Amlodipine, HCTZ, and Aspirin.  Her Hyperlipidemia has been unstable.  LDL goal < 100.  Last , .    Medication: None (previously declined statin treatment).    Her Prediabetes is newly diagnosed in June 2023.  Medication: None.  Side Effects: None.     Procedures: On 7/16/2019, Cardiac CT scan showed Calcium Score 0.     Interval Events: She has not been checking her blood pressure at home.  She is not sure why her blood pressure is high today.  On 12/6/2022,  FBS was 125.  On 6/6/2023, FBS was 125 and HgA1C was 5.9.  On 7/18/2023,  HgA1C was 5.9.  She is requesting a weight loss medication.     Symptoms: Reports MELISSA (only with steps), which is not new.  Denies chest pain, dyspnea, MELISSA, orthopnea, PND, palpitations, syncope, lower extremity edema, claudication, lightheadedness, and dizziness.   Associated Symptoms: Weight up 7 pounds in the past 6 months.  No fatigue, headaches, polyuria, polydipsia, arthralgias, myalgias, visual impairment, memory loss, or concentration issues.      Lifestyle: She  "consumes a diverse and healthy diet. She walks or bikes outdoors 5 times per week, and is active overall.   Tobacco Use: Former smoker.      GERD Follow-up: The patient is being seen for a routine clinic follow-up of Gastroesophageal Reflux Disease, which is stable.   Comorbid Illness: Irritable Bowel Syndrome.  Takes Prozac.  Interval Events: None.  Symptoms: Reports acid reflux (if to light).  No abdominal pain, heartburn, nausea, vomiting, hematemesis, dysphagia, odynophagia, hematochezia, melena, early satiety, belching, or bloating.   Associated Symptoms:  No chronic cough, sore throat, hoarseness, or wheezing.   Medications: Omeprazole, now taking qod due to intolerance.  Takes Tums as needed.    Side Effects: \"sick hunger feeling\".      Colonic Polyp Follow-up: The patient is being seen for a routine clinic follow-up of Colon Polyp(s), which is stable.   Pertinent Medical History: Anal Fissure.  Interval Events:  Current diagnosis was determined by Colonoscopy and last 12/13/17, no polyps.  She previously saw Dr. Lozano, who has retired.  She was told to follow-up in 10 years.  Symptoms:   No abdominal pain, diarrhea, constipation, hematochezia, melena, decreased stool caliber, or change in bowel habits.    Medication: Docusate Sodium and Probiotic daily.  Has also started a Magnesium supplement daily.      Gaby Ratliff is a 63 y.o. female who presents for an Annual Physical.      PMH, PSH, SocHx, FamHx, Allergies, and Medications: Reviewed and updated.    Outpatient Medications Prior to Visit   Medication Sig Dispense Refill    acetaminophen (TYLENOL) 500 MG tablet Take 2 tablets by mouth 2 (Two) Times a Day As Needed for Mild Pain.      amLODIPine (NORVASC) 10 MG tablet Take 1 tablet by mouth Daily. 90 tablet 1    aspirin 81 MG EC tablet Take 1 tablet by mouth Every Other Day.      calcium citrate-vitamin d (CALCITRATE) 315-250 MG-UNIT tablet tablet Take 2 tablets by mouth Daily.      Docusate Calcium " (STOOL SOFTENER PO) 2 capsules Daily.      FLUoxetine (PROzac) 20 MG capsule TAKE 1 CAPSULE BY MOUTH EVERY DAY 30 capsule 11    fluticasone (FLONASE) 50 MCG/ACT nasal spray into each nostril daily.      GARLIC OIL PO Take  by mouth.      Glucos-Chondroit-Hyaluron-MSM (GLUCOSAMINE CHONDROITIN JOINT PO) 2 tablets Daily.      hydroCHLOROthiazide (HYDRODIURIL) 25 MG tablet Take 1 tablet by mouth Daily. 90 tablet 3    Loratadine 10 MG capsule Take  by mouth.      MAGNESIUM GLYCINATE PO Take 500 mg by mouth Daily.      montelukast (SINGULAIR) 10 MG tablet TAKE 1 TABLET BY MOUTH EVERY DAY 30 tablet 11    Multiple Vitamins-Minerals (MULTI VITAMIN/MINERALS PO) Take  by mouth daily.      OMEGA 3-6-9 FATTY ACIDS PO Take  by mouth.      Probiotic Product (ALIGN) 4 MG capsule 1 capsule Daily.      Triamcinolone Acetonide (NASACORT AQ NA) into the nostril(s) as directed by provider.      Turmeric 500 MG capsule Take 1 capsule by mouth Daily.      omeprazole (priLOSEC) 20 MG capsule Take 1 capsule by mouth Every Other Day. 90 capsule 3     No facility-administered medications prior to visit.       Immunization History   Administered Date(s) Administered    COVID-19 (PFIZER) BIVALENT 12+YRS 12/06/2022    COVID-19 (PFIZER) Purple Cap Monovalent 04/04/2021, 04/26/2021, 11/19/2021    Covid-19 (Pfizer) Gray Cap Monovalent 06/07/2022    Flu Vaccine Quad PF >36MO 10/04/2016, 10/07/2017    FluMist 2-49yrs 01/10/2016, 10/07/2017    Fluzone (or Fluarix & Flulaval for VFC) >6mos 12/19/2018, 10/14/2019, 09/25/2020, 10/14/2021, 12/15/2023    Fluzone Quad >6mos (Multi-dose) 11/12/2022    Hepatitis A 12/19/2018, 06/28/2019    Tdap 03/21/2011, 10/14/2021         Patient Active Problem List   Diagnosis    History of left breast cancer    Lung nodule, solitary - Ground Glass    Allergic rhinitis    Anemia    Benign colonic polyp    Gastroesophageal reflux disease    Hypertension    Irritable bowel syndrome    Menopausal state    Sigmoid  diverticulosis    Internal hemorrhoids    Hyperlipidemia    Seasonal and perennial allergic rhinitis    Prediabetes    Obesity (BMI 30-39.9)       Health Habits:  Dental Exam. up to date  Eye Exam. up to date  Hearing Loss:  No  Exercise: 5 times/week.  Current exercise activities include: bicycling outdoors and walking  Diet: Healthy  Multivitamin: Yes    Safe Driving:  Yes  Seat Belt:  Yes  Bike Helmet:  Yes  Skin Screening:  Yes  Sunscreen: Yes  SBE / COLE: Yes, every few months  Sexual Activity:    Birth Control:  Menopause  STD Prevention:  N/A    Last Pap: 8/10/2023, normal per patient report (GYN)  Last Mammogram: 2023, category 2.  Last DEXA Scan: 2017, normal.  Last Colonoscopy: 18, no polyps.  Last PSA: N/A    Social:    Social History     Socioeconomic History    Marital status:     Number of children: 2   Tobacco Use    Smoking status: Former     Packs/day: 0.50     Years: 5.00     Additional pack years: 0.00     Total pack years: 2.50     Types: Cigarettes     Quit date:      Years since quittin.9     Passive exposure: Past (childhood)    Smokeless tobacco: Never    Tobacco comments:     High school & college   Vaping Use    Vaping Use: Never used   Substance and Sexual Activity    Alcohol use: Yes     Alcohol/week: 14.0 standard drinks of alcohol     Types: 14 Glasses of wine per week     Comment: 1-3 glasses of wine daily    Drug use: No    Sexual activity: Yes     Partners: Male     Birth control/protection: Post-menopausal         Current Medical Providers:    Eun Mcmanus MD (Internal Medicine / Pediatrics)    The [facility] providers who are involved in the care of this patient are listed above.         Review of Systems   Constitutional:  Positive for unexpected weight change. Negative for chills, fatigue and fever.        No night sweats.    HENT:  Positive for postnasal drip. Negative for congestion, ear discharge, ear pain, facial swelling, hearing loss,  nosebleeds, rhinorrhea, sinus pressure, sinus pain, sneezing, sore throat, tinnitus and voice change.         Denies snoring.   Eyes:  Positive for discharge (watery from dry eyes). Negative for photophobia, pain, redness, itching and visual disturbance.   Respiratory:  Negative for cough, chest tightness, shortness of breath and wheezing.         No chest congestion.  No hemoptysis.   Cardiovascular:  Negative for chest pain, palpitations and leg swelling.        Stable MELISSA. No orthopnea or PND.  No claudication or syncope.   Gastrointestinal:  Negative for abdominal pain, blood in stool, constipation, diarrhea, nausea, rectal pain and vomiting.        Has intermittent acid reflux.  No melena.  No hematemesis.  No heartburn, dysphagia or odynophagia.  No early satiety, belching, or bloating.    Endocrine: Negative for cold intolerance, heat intolerance, polydipsia, polyphagia and polyuria.        No hair loss or dry skin.  Has hot flashes.     Genitourinary:  Negative for difficulty urinating, dyspareunia, dysuria, flank pain, frequency, hematuria, pelvic pain, urgency, vaginal bleeding and vaginal discharge.        No nocturia, incomplete emptying, or incontinence.   Musculoskeletal:  Positive for back pain. Negative for arthralgias, gait problem, joint swelling, myalgias, neck pain and neck stiffness.        No joint stiffness.   Skin:  Negative for rash.        No new skin lesions or changes in skin lesions. No breast pain or masses.  No nipple discharge or nipple inversion.   Neurological:  Negative for dizziness, tremors, syncope, speech difficulty, weakness, light-headedness, numbness and headaches.        No tingling.  No memory loss.  No decreased concentration.   Hematological:  Negative for adenopathy. Does not bruise/bleed easily.   Psychiatric/Behavioral:  Negative for confusion, self-injury, sleep disturbance and suicidal ideas. The patient is not nervous/anxious.         No depression.  "          Objective     Vitals:    12/15/23 0841   BP: 154/92   BP Location: Right arm   Patient Position: Sitting   Cuff Size: Adult   Pulse: 86   Resp: 16   Temp: 97.8 °F (36.6 °C)   TempSrc: Infrared   Weight: 94.3 kg (208 lb)   Height: 162.6 cm (64\")   PainSc: 0-No pain       Body mass index is 35.7 kg/m².    Physical Exam  Vitals and nursing note reviewed.   Constitutional:       Appearance: Normal appearance. She is well-developed.      Comments: BMI greater than 35   HENT:      Head: Normocephalic and atraumatic.      Right Ear: Tympanic membrane, ear canal and external ear normal.      Left Ear: Tympanic membrane, ear canal and external ear normal.      Mouth/Throat:      Mouth: Mucous membranes are moist. No oral lesions.      Pharynx: Oropharynx is clear.      Comments: Tonsils normal.  Eyes:      Extraocular Movements: Extraocular movements intact.      Conjunctiva/sclera: Conjunctivae normal.      Pupils: Pupils are equal, round, and reactive to light.      Comments: Fundi normal bilaterally.   Neck:      Thyroid: No thyromegaly.      Vascular: No carotid bruit.   Cardiovascular:      Rate and Rhythm: Normal rate and regular rhythm.      Pulses: Normal pulses.      Heart sounds: Normal heart sounds. No murmur heard.     No friction rub. No gallop.   Pulmonary:      Effort: Pulmonary effort is normal.      Breath sounds: Normal breath sounds.      Comments: The patient declined a breast exam today, due to yearly exams by Dr. Oritz and Dr. Lara  Abdominal:      General: Bowel sounds are normal. There is no distension or abdominal bruit.      Palpations: Abdomen is soft. There is no hepatomegaly, splenomegaly or mass.      Tenderness: There is no abdominal tenderness.   Genitourinary:     Comments:  and rectal exam deferred.  Musculoskeletal:         General: Normal range of motion.      Cervical back: Normal range of motion and neck supple.      Right lower leg: No edema.      Left lower leg: No " edema.   Lymphadenopathy:      Cervical: No cervical adenopathy.      Upper Body:      Right upper body: No supraclavicular adenopathy.      Left upper body: No supraclavicular adenopathy.   Skin:     Findings: No rash.      Comments: No atypical skin lesions.   Neurological:      Mental Status: She is alert.      Cranial Nerves: No cranial nerve deficit.      Motor: Motor function is intact. No abnormal muscle tone.      Coordination: Coordination is intact.      Gait: Gait is intact.      Deep Tendon Reflexes: Reflexes are normal and symmetric.   Psychiatric:         Mood and Affect: Mood normal.         PHQ-2 Depression Screening  Little interest or pleasure in doing things? 0-->not at all   Feeling down, depressed, or hopeless? 0-->not at all   PHQ-2 Total Score 0         Counseling was given to patient for the following topics: appropriate exercise, healthy eating habits, disease prevention, risk factors for cancer, importance of self breast exam and breast health, importance of immunizations, including risks and benefits, sun safety, seatbelt use, and safe driving. Also discussed the importance of regular dental and vision care, as well recommendation for a yearly screening skin exam after age 40.  Written information provided to patient on these topics and other health maintenance issues.        Diagnoses and all orders for this visit:    1. Encounter for health maintenance examination in adult (Primary)  -     CANCEL POC Urinalysis Dipstick, Automated- Patient Unable to Void   -     Lipid Panel  -     Comprehensive Metabolic Panel  -     TSH  -     CBC & Differential    2. Primary hypertension  -     CANCEL POC Urinalysis Dipstick, Automated- Patient Unable to Void   -     Lipid Panel  -     Comprehensive Metabolic Panel  -     TSH  -     CBC & Differential   Continue current medication(s) as noted in the history of present illness.    3. Dyslipidemia  -     Lipid Panel  -     Comprehensive Metabolic  Panel  -     TSH  -     CBC & Differential    4. Prediabetes  -     CANCEL POC Urinalysis Dipstick, Automated- Patient Unable to Void   -     Hemoglobin A1c  -     Lipid Panel  -     Comprehensive Metabolic Panel  -     TSH  -     CBC & Differential    5. Class 2 severe obesity with serious comorbidity and body mass index (BMI) of 35.0 to 35.9 in adult, unspecified obesity type  -     Tirzepatide-Weight Management (ZEPBOUND) 2.5 MG/0.5ML solution auto-injector; Inject 0.5 mL under the skin into the appropriate area as directed 1 (One) Time Per Week.  Dispense: 2 mL; Refill: 0- NEW   BMI Plan    6. Gastroesophageal reflux disease, unspecified whether esophagitis present  -     famotidine (Pepcid) 20 MG tablet; Take 1 tablet by mouth 2 (Two) Times a Day.  Dispense: 60 tablet; Refill: 5- NEW   Plan to wean off Omeprazole (side effects).    Continue Tums as needed.    7. Benign colonic polyp   Colonoscopy up-to-date.    8. Menopause  -     DEXA Bone Density Axial; Future    9. High risk medication use  -     Magnesium    10. Need for immunization against influenza  -     Fluzone >6 Months (1028-7874)      I recommend Shingrix (Shingles vaccine), COVID bivalent vaccine, and RSV vaccine at the pharmacy.  Class 2 Severe Obesity (BMI >=35 and <=39.9). Obesity-related health conditions include the following: hypertension, dyslipidemias, GERD, osteoarthritis, and prediabetes . Obesity is worsening. BMI is is above average; BMI management plan is completed. We discussed portion control and increasing exercise. Medication rx'd.            Return in about 3 months (around 3/15/2024) for Recheck Obesity, fasting.

## 2023-12-16 PROBLEM — E66.9 OBESITY (BMI 30-39.9): Status: ACTIVE | Noted: 2023-12-16

## 2023-12-19 ENCOUNTER — TELEPHONE (OUTPATIENT)
Dept: INTERNAL MEDICINE | Facility: CLINIC | Age: 63
End: 2023-12-19
Payer: COMMERCIAL

## 2023-12-19 NOTE — TELEPHONE ENCOUNTER
----- Message from Gaby Ratliff sent at 12/19/2023 11:01 AM EST -----  Regarding: Zepbound  Contact: [phone]  My insurance is requesting a prior authorization form to be filled out by Dr Mcmanus. TIA

## 2023-12-21 NOTE — TELEPHONE ENCOUNTER
Additional Information Required  This request cannot be processed due to the medication is not covered by the plan.

## 2024-01-04 DIAGNOSIS — I10 PRIMARY HYPERTENSION: ICD-10-CM

## 2024-01-04 RX ORDER — HYDROCHLOROTHIAZIDE 25 MG/1
25 TABLET ORAL DAILY
Qty: 30 TABLET | Refills: 4 | Status: SHIPPED | OUTPATIENT
Start: 2024-01-04

## 2024-01-10 DIAGNOSIS — E66.01 CLASS 2 SEVERE OBESITY WITH SERIOUS COMORBIDITY AND BODY MASS INDEX (BMI) OF 35.0 TO 35.9 IN ADULT, UNSPECIFIED OBESITY TYPE: ICD-10-CM

## 2024-01-10 NOTE — TELEPHONE ENCOUNTER
Spoke with patient, states she would love to go up to the next dose.  Explained Dr Mcmanus will send in rx for her.  Verbalized great appreciation.

## 2024-01-19 ENCOUNTER — TELEPHONE (OUTPATIENT)
Dept: INTERNAL MEDICINE | Facility: CLINIC | Age: 64
End: 2024-01-19
Payer: COMMERCIAL

## 2024-01-19 ENCOUNTER — TELEPHONE (OUTPATIENT)
Dept: INTERNAL MEDICINE | Facility: CLINIC | Age: 64
End: 2024-01-19

## 2024-01-19 DIAGNOSIS — E66.01 CLASS 2 SEVERE OBESITY WITH SERIOUS COMORBIDITY AND BODY MASS INDEX (BMI) OF 35.0 TO 35.9 IN ADULT, UNSPECIFIED OBESITY TYPE: ICD-10-CM

## 2024-01-19 NOTE — TELEPHONE ENCOUNTER
Caller: Gaby Ratliff    Relationship: Self    Best call back number: [phone]     What is the best time to reach you: ANY    Who are you requesting to speak with (clinical staff, provider,  specific staff member): DR. YANG'S NURSE    What was the call regarding: THE PATIENT WOULD LIKE A CALL FROM THE NURSE BECAUSE THERE HAS BEEN A HICCUP IN THE ZEPBOUND PRESCRIPTION. SHE WANTED TO DISCUSS WITH HER SINCE IT'S A BIT LENGTHY OF A MESSAGE. PLEASE CALL HER ASAP.     Is it okay if the provider responds through MyChart: NO

## 2024-01-19 NOTE — TELEPHONE ENCOUNTER
DELETE AFTER REVIEWING: Telephone encounter to be sent to the clinical pool.    Pharmacy Name: [facility] [id number] - [facility] [address] AT [facility] [address] - [phone]  - [phone]      Pharmacy representative phone number: [phone]    What medication are you calling in regards to: ZEPBOUND    What question does the pharmacy have: THE PHARAMCY STATES THAT THEY DONT HAVE THE 5 MG DOSAGE IN STOCK BUT THEY DO HAVE THE 2.5 MG THE PATIENT WANTS TO KNOW IF THE PHARMACY CAN FILL THE 2.5 MG ONE MORE TIME SO THAT SHE CAN GET THE MEDICATION     Who is the provider that prescribed the medication: DOCTOR CELIA

## 2024-01-19 NOTE — TELEPHONE ENCOUNTER
Patient has been notified and verb good understanding. Patient wanted me to tell you that she is still wanting to do the Dexa scan but will be out of town til May.

## 2024-03-02 DIAGNOSIS — K21.9 GASTROESOPHAGEAL REFLUX DISEASE, UNSPECIFIED WHETHER ESOPHAGITIS PRESENT: ICD-10-CM

## 2024-03-02 DIAGNOSIS — R73.03 PREDIABETES: ICD-10-CM

## 2024-03-02 DIAGNOSIS — E66.9 OBESITY (BMI 30-39.9): Primary | ICD-10-CM

## 2024-03-04 RX ORDER — FAMOTIDINE 20 MG/1
20 TABLET, FILM COATED ORAL 2 TIMES DAILY
Qty: 60 TABLET | Refills: 5 | Status: SHIPPED | OUTPATIENT
Start: 2024-03-04

## 2024-03-04 RX ORDER — TIRZEPATIDE 5 MG/.5ML
INJECTION, SOLUTION SUBCUTANEOUS
Qty: 2 ML | Refills: 0 | Status: SHIPPED | OUTPATIENT
Start: 2024-03-04

## 2024-03-04 NOTE — TELEPHONE ENCOUNTER
Called patient and she stated that she would like to stay on her current dose for another month, She is having some nausea after eating. She also requested a refill on her Pepcid.

## 2024-03-11 DIAGNOSIS — K58.9 IRRITABLE BOWEL SYNDROME WITHOUT DIARRHEA: ICD-10-CM

## 2024-03-11 RX ORDER — FLUOXETINE HYDROCHLORIDE 20 MG/1
20 CAPSULE ORAL DAILY
Qty: 90 CAPSULE | Refills: 0 | Status: SHIPPED | OUTPATIENT
Start: 2024-03-11

## 2024-03-29 DIAGNOSIS — R73.03 PREDIABETES: ICD-10-CM

## 2024-03-29 DIAGNOSIS — E66.9 OBESITY (BMI 30-39.9): ICD-10-CM

## 2024-04-01 RX ORDER — TIRZEPATIDE 5 MG/.5ML
INJECTION, SOLUTION SUBCUTANEOUS
Qty: 0.5 ML | Refills: 0 | Status: SHIPPED | OUTPATIENT
Start: 2024-04-01 | End: 2024-04-04

## 2024-04-04 NOTE — TELEPHONE ENCOUNTER
Patient stated that she would like to go up to the next dose of 7.5mg Patient has not had any side effects. Patient has an upcoming appointment with you on 05/29/2024

## 2024-04-04 NOTE — TELEPHONE ENCOUNTER
Caller: Gaby Ratliff    Relationship: Self    Best call back number: [phone]      Who are you requesting to speak with (clinical staff, provider,  specific staff member): DR YANG'S NURSE      What was the call regarding: PATIENT WOULD LIKE TO SPEAK WITH THE NURSE ABOUT GETTING A REFILL ZEPBOUND. PATIENT STATED THAT SHE NEEDS THE 7.5.  PATIENT WOULD LIKE TO DISCUSS WITH THE NURSE BEFORE THIS IS SENT TO THE PHARMACY.

## 2024-05-02 ENCOUNTER — TELEPHONE (OUTPATIENT)
Dept: INTERNAL MEDICINE | Facility: CLINIC | Age: 64
End: 2024-05-02
Payer: COMMERCIAL

## 2024-05-02 DIAGNOSIS — Z78.0 MENOPAUSE: Primary | ICD-10-CM

## 2024-05-02 NOTE — TELEPHONE ENCOUNTER
Caller: Gaby Ratliff    Relationship: Self    Best call back number: [phone]     What orders are you requesting (i.e. lab or imaging): DEXA BONE DENSITY    In what timeframe would the patient need to come in: ASAP    Where will you receive your lab/imaging services: SAME PLACE DR. YANG SENT THIS THE FIRST TIME    Additional notes: THE PATIENT CALLED TO SCHEDULE HER BONE DENSITY BUT THEY TOLD HER THAT THEY DO NOT HAVE AN ACTIVE REFERRAL ANYMORE FOR HER. THEY NEED A NEW ONE SENT OVER.

## 2024-05-02 NOTE — TELEPHONE ENCOUNTER
Reached patient at [phone]   Told her Dr Mcmanus got her message, placed this order for her, told her I will fax to [facility], to please allow them a day or 2 to process the fax and then you may call them at [phone] to schedule.  Please let me know when you are scheduled.

## 2024-05-23 DIAGNOSIS — I10 PRIMARY HYPERTENSION: ICD-10-CM

## 2024-05-23 RX ORDER — AMLODIPINE BESYLATE 10 MG/1
10 TABLET ORAL DAILY
Qty: 90 TABLET | Refills: 3 | Status: SHIPPED | OUTPATIENT
Start: 2024-05-23

## 2024-05-23 NOTE — TELEPHONE ENCOUNTER
LOV for chronic condition 12/15/23  NOV  05/29/24   Ketoconazole Pregnancy And Lactation Text: This medication is Pregnancy Category C and it isn't know if it is safe during pregnancy. It is also excreted in breast milk and breast feeding isn't recommended.

## 2024-06-05 ENCOUNTER — HOSPITAL ENCOUNTER (OUTPATIENT)
Dept: CT IMAGING | Facility: HOSPITAL | Age: 64
Discharge: HOME | End: 2024-06-05
Admitting: INTERNAL MEDICINE
Payer: COMMERCIAL

## 2024-06-05 DIAGNOSIS — R91.1 LUNG NODULE, SOLITARY: ICD-10-CM

## 2024-06-05 PROCEDURE — 71250 CT THORAX DX C-: CPT

## 2024-06-21 ENCOUNTER — DOCUMENTATION (OUTPATIENT)
Dept: PULMONOLOGY | Facility: CLINIC | Age: 64
End: 2024-06-21
Payer: COMMERCIAL

## 2024-06-21 NOTE — PROGRESS NOTES
CT scan of the chest performed to follow-up on groundglass nodule which appears stable    She has a new right lower lobe irregular pleural-based density which will require follow-up.    Will get her into the office for follow-up appointment to discuss

## 2024-06-25 DIAGNOSIS — Z85.3 HISTORY OF LEFT BREAST CANCER: Primary | ICD-10-CM

## 2024-06-25 DIAGNOSIS — Z12.31 ENCOUNTER FOR SCREENING MAMMOGRAM FOR BREAST CANCER: ICD-10-CM

## 2024-06-26 ENCOUNTER — OFFICE VISIT (OUTPATIENT)
Dept: INTERNAL MEDICINE | Facility: CLINIC | Age: 64
End: 2024-06-26
Payer: COMMERCIAL

## 2024-06-26 VITALS
TEMPERATURE: 98 F | BODY MASS INDEX: 33.51 KG/M2 | SYSTOLIC BLOOD PRESSURE: 138 MMHG | HEART RATE: 84 BPM | RESPIRATION RATE: 18 BRPM | WEIGHT: 195.25 LBS | DIASTOLIC BLOOD PRESSURE: 82 MMHG

## 2024-06-26 DIAGNOSIS — K63.5 BENIGN COLONIC POLYP: ICD-10-CM

## 2024-06-26 DIAGNOSIS — R82.998 LEUKOCYTES IN URINE: ICD-10-CM

## 2024-06-26 DIAGNOSIS — J30.2 SEASONAL AND PERENNIAL ALLERGIC RHINITIS: ICD-10-CM

## 2024-06-26 DIAGNOSIS — R91.1 LUNG NODULE, SOLITARY: ICD-10-CM

## 2024-06-26 DIAGNOSIS — R73.03 PREDIABETES: ICD-10-CM

## 2024-06-26 DIAGNOSIS — K21.9 GASTROESOPHAGEAL REFLUX DISEASE, UNSPECIFIED WHETHER ESOPHAGITIS PRESENT: ICD-10-CM

## 2024-06-26 DIAGNOSIS — E66.9 OBESITY (BMI 30-39.9): ICD-10-CM

## 2024-06-26 DIAGNOSIS — I10 PRIMARY HYPERTENSION: Primary | ICD-10-CM

## 2024-06-26 DIAGNOSIS — E78.5 DYSLIPIDEMIA: ICD-10-CM

## 2024-06-26 DIAGNOSIS — J30.89 SEASONAL AND PERENNIAL ALLERGIC RHINITIS: ICD-10-CM

## 2024-06-26 LAB
ALBUMIN SERPL-MCNC: 5 G/DL (ref 3.5–5.2)
ALBUMIN/GLOB SERPL: 1.8 G/DL
ALP SERPL-CCNC: 75 U/L (ref 39–117)
ALT SERPL W P-5'-P-CCNC: 36 U/L (ref 1–33)
ANION GAP SERPL CALCULATED.3IONS-SCNC: 15.3 MMOL/L (ref 5–15)
AST SERPL-CCNC: 26 U/L (ref 1–32)
BASOPHILS # BLD AUTO: 0.06 10*3/MM3 (ref 0–0.2)
BASOPHILS NFR BLD AUTO: 0.8 % (ref 0–1.5)
BILIRUB BLD-MCNC: NEGATIVE MG/DL
BILIRUB SERPL-MCNC: 0.4 MG/DL (ref 0–1.2)
BUN SERPL-MCNC: 12 MG/DL (ref 8–23)
BUN/CREAT SERPL: 14 (ref 7–25)
CALCIUM SPEC-SCNC: 10.2 MG/DL (ref 8.6–10.5)
CHLORIDE SERPL-SCNC: 102 MMOL/L (ref 98–107)
CHOLEST SERPL-MCNC: 259 MG/DL (ref 0–200)
CLARITY, POC: CLEAR
CO2 SERPL-SCNC: 25.7 MMOL/L (ref 22–29)
COLOR UR: YELLOW
CREAT SERPL-MCNC: 0.86 MG/DL (ref 0.57–1)
DEPRECATED RDW RBC AUTO: 44.2 FL (ref 37–54)
EGFRCR SERPLBLD CKD-EPI 2021: 76.5 ML/MIN/1.73
EOSINOPHIL # BLD AUTO: 0.2 10*3/MM3 (ref 0–0.4)
EOSINOPHIL NFR BLD AUTO: 2.8 % (ref 0.3–6.2)
ERYTHROCYTE [DISTWIDTH] IN BLOOD BY AUTOMATED COUNT: 13 % (ref 12.3–15.4)
EXPIRATION DATE: ABNORMAL
EXPIRATION DATE: NORMAL
GLOBULIN UR ELPH-MCNC: 2.8 GM/DL
GLUCOSE SERPL-MCNC: 121 MG/DL (ref 65–99)
GLUCOSE UR STRIP-MCNC: NEGATIVE MG/DL
HBA1C MFR BLD: 5.5 % (ref 4.5–5.7)
HCT VFR BLD AUTO: 43.2 % (ref 34–46.6)
HDLC SERPL-MCNC: 61 MG/DL (ref 40–60)
HGB BLD-MCNC: 14.1 G/DL (ref 12–15.9)
IMM GRANULOCYTES # BLD AUTO: 0.02 10*3/MM3 (ref 0–0.05)
IMM GRANULOCYTES NFR BLD AUTO: 0.3 % (ref 0–0.5)
KETONES UR QL: NEGATIVE
LDLC SERPL CALC-MCNC: 150 MG/DL (ref 0–100)
LDLC/HDLC SERPL: 2.39 {RATIO}
LEUKOCYTE EST, POC: ABNORMAL
LYMPHOCYTES # BLD AUTO: 1.52 10*3/MM3 (ref 0.7–3.1)
LYMPHOCYTES NFR BLD AUTO: 21 % (ref 19.6–45.3)
Lab: ABNORMAL
Lab: NORMAL
MCH RBC QN AUTO: 30.9 PG (ref 26.6–33)
MCHC RBC AUTO-ENTMCNC: 32.6 G/DL (ref 31.5–35.7)
MCV RBC AUTO: 94.5 FL (ref 79–97)
MONOCYTES # BLD AUTO: 0.54 10*3/MM3 (ref 0.1–0.9)
MONOCYTES NFR BLD AUTO: 7.5 % (ref 5–12)
NEUTROPHILS NFR BLD AUTO: 4.9 10*3/MM3 (ref 1.7–7)
NEUTROPHILS NFR BLD AUTO: 67.6 % (ref 42.7–76)
NITRITE UR-MCNC: NEGATIVE MG/ML
NRBC BLD AUTO-RTO: 0 /100 WBC (ref 0–0.2)
PH UR: 6 [PH] (ref 5–8)
PLATELET # BLD AUTO: 393 10*3/MM3 (ref 140–450)
PMV BLD AUTO: 10.8 FL (ref 6–12)
POTASSIUM SERPL-SCNC: 4.9 MMOL/L (ref 3.5–5.2)
PROT SERPL-MCNC: 7.8 G/DL (ref 6–8.5)
PROT UR STRIP-MCNC: NEGATIVE MG/DL
RBC # BLD AUTO: 4.57 10*6/MM3 (ref 3.77–5.28)
RBC # UR STRIP: NEGATIVE /UL
SODIUM SERPL-SCNC: 143 MMOL/L (ref 136–145)
SP GR UR: 1.01 (ref 1–1.03)
TRIGL SERPL-MCNC: 262 MG/DL (ref 0–150)
UROBILINOGEN UR QL: NORMAL
VLDLC SERPL-MCNC: 48 MG/DL (ref 5–40)
WBC NRBC COR # BLD AUTO: 7.24 10*3/MM3 (ref 3.4–10.8)

## 2024-06-26 PROCEDURE — 83036 HEMOGLOBIN GLYCOSYLATED A1C: CPT | Performed by: INTERNAL MEDICINE

## 2024-06-26 PROCEDURE — 87086 URINE CULTURE/COLONY COUNT: CPT | Performed by: INTERNAL MEDICINE

## 2024-06-26 PROCEDURE — 80061 LIPID PANEL: CPT | Performed by: INTERNAL MEDICINE

## 2024-06-26 PROCEDURE — 80053 COMPREHEN METABOLIC PANEL: CPT | Performed by: INTERNAL MEDICINE

## 2024-06-26 PROCEDURE — 99214 OFFICE O/P EST MOD 30 MIN: CPT | Performed by: INTERNAL MEDICINE

## 2024-06-26 PROCEDURE — 81003 URINALYSIS AUTO W/O SCOPE: CPT | Performed by: INTERNAL MEDICINE

## 2024-06-26 PROCEDURE — 85025 COMPLETE CBC W/AUTO DIFF WBC: CPT | Performed by: INTERNAL MEDICINE

## 2024-06-26 NOTE — PATIENT INSTRUCTIONS
I recommend Shingrix (Shingles vaccine) at the pharmacy.    I recommend Covid 19 bivalent vaccine and RSV vaccine at the pharmacy, September 2024.

## 2024-06-26 NOTE — PROGRESS NOTES
Subjective       Gaby Ratliff is a 63 y.o. female.     Chief Complaint   Patient presents with    Hypertension     6 month follow up    Hyperlipidemia       History obtained from the patient.      History of Present Illness      The patient was diagnosed with a right upper lobe Pulmonary Nodule in April 2014, per CT Chest done at [facility].  CT Chest done at [facility] on 7/9/2014 was read as a stable right middle lobe lung nodule.  CT Chest done on 4/19/2017, at [facility] showed the right middle lobe nodule to be slightly smaller than the previous CT.  On 7/31/2019, CT Chest at [facility] showed a right upper lobe 1 cm nodule.  Right middle lobe nodule had decreased in size.  She saw Dr. Clarke Mason.  On 7/20/2020,  CT showed a RUL ground glass nodule, stable x 1 year.  She saw Dr. Mason again on 7/26/2021.  He felt her abnormal CT chest findings were most consistent with chronic Histoplasmosis.  He ordered multiple blood test (fungal antibodies, histoplasmosis antigen, and quantiferon TB), which were negative.  Biopsy has not been done.  PET scan on 8/10/2021 showed the right upper lobe pulmonary nodular focus or cluster of micronodules to be non-hypermetabolic.  CT Chest done 5/19/2022 showed the right middle lobe nodule to be unchanged since 11/18/2021, but not previously evaluated by the PET scan done on 8/10/2021.  The other solid pulmonary nodules were unchanged since 7/31/2019.  The groundglass nodule in the right upper lobe was stable for at least 2 years.  Findings were most consistent with postinfectious/inflammatory nodules and granulomatous disease exposure.  She saw Dr. Mason last on 5/26/2022 and he felt things were stable and recommended following the nodules for a total of 5 years.  Last CT chest on 5/24/2023 showed the right middle lobe lung nodule to be stable since 9/2021.  Other nodular densities within the right lung were stable since at least 7/2020.  There were no  "new pulmonary nodules.  Dr. Clarke Mason has retired.  CT chest on 6/5/2024 showed: 1.New area of somewhat vague peripheral irregular increased density in the posterior right lower lobe, likely infectious/inflammatory. Follow-up 3 months recommended. 2.Stable appearance of the chest otherwise. She saw Dr. Dominik Mason on 6/9/2023, and has a follow-up appointment scheduled for 7/9/2024.  She reports a chronic wet cough.  She denies hemoptysis, chest pain/tightness, dyspnea, wheezing, and MELISSA.    The patient has Chronic Allergic Rhinitis.  Symptoms are overall stable on Flonase, Nasacort, Claritin, and Singulair.  She reports chronic postnasal drainage and a chronic wet cough.  She denies other allergy symptoms.  She sees an Allergist.   She has also seen ENT.     He patient is followed by Dr. Ortiz for a history of Left Breast Cancer, next appointment scheduled for 7/9/2024..  Mammogram on 6/25/2024 was category 2.   She denies any breast symptoms.      The patient has Chronic Low Back Pain and Hip Pain.  She saw Orthopedics.  She was diagnosed with Osteoarthritis and a pinched nerve.  She is off NSAIDs.  She also takes Glucosamine/Chondroitin and Turmeric.  She has also started a Magnesium supplement daily.  Pain is stable.         Cardiac Follow-up: The patient is here today for a follow-up visit.      Her Hypertension has been stable.   Medication: Amlodipine, HCTZ, and Aspirin.  Her Hyperlipidemia has been unstable.  LDL goal < 100.  Last , ..    Medication: None (previously declined statin treatment).    Her Prediabetes has been stable.  Medication: None.  Side Effects: None.    Comorbid Illness: Obesity (BMI 30.0-34.9).  She took Zepbound x 2 months (maximum dose 0.5 mg weekly) and had trouble getting additional refills.  She states she lost 10 pounds but it it made her \"wonky\".     Procedures: On 7/16/2019, Cardiac CT Scan showed Calcium Score 0.     Interval Events: She reports her blood " "pressure at home has been 130's-160's / 70's-90's (home blood pressure log reviewed).  She states her diastolic blood pressure has been as high as 110.  On 12/15/2023, HgA1C was 5.9.      Symptoms: She reports lightheadedness and dizziness with blood pressure fluctuations.  Denies chest pain, dyspnea, MELISSA, orthopnea, PND, palpitations, syncope, lower extremity edema, and claudication.   Associated Symptoms: Weight down 13 pounds intentionally in the past 6 months.  No fatigue, headaches, polyuria, polydipsia, arthralgias, myalgias, visual impairment, memory loss, or concentration issues.      Lifestyle: She consumes a diverse and healthy diet, and reports a decrease in wine consumption.. She walks or bikes outdoors 5 times per week, and is active overall.   Tobacco Use: Former smoker.      GERD Follow-up: The patient is being seen for a routine clinic follow-up of Gastroesophageal Reflux Disease, which is stable.   Comorbid Illness: Irritable Bowel Syndrome.  Takes Prozac.  Interval Events: Omeprazole was switched to Pepcid last visit..  Symptoms: Reports stable intermittent acid reflux and  heartburn.  No abdominal pain, nausea, vomiting, hematemesis, dysphagia, odynophagia, hematochezia, melena, early satiety, belching, or bloating.   Associated Symptoms: Reports a chronic wet cough.  No chronic sore throat, hoarseness, or wheezing.   Medications: Pepcid prn.    Side Effects: None.  Had a \"sick hunger feeling\" with Omeprazole.      Colonic Polyp Follow-up: The patient is being seen for a routine clinic follow-up of Colon Polyp(s), which is stable.   Pertinent Medical History: Anal Fissure.  Interval Events:  Current diagnosis was determined by Colonoscopy and last 12/13/17, no polyps.  She previously saw Dr. Lozano, who has retired.  She was told to follow-up in 10 years.  Symptoms: Stable constipation.  No abdominal pain, diarrhea, hematochezia, melena, or change in stool.  Medication: Docusate Sodium and Probiotic " daily.  Has also started a Magnesium supplement daily.          Current Outpatient Medications on File Prior to Visit   Medication Sig Dispense Refill    acetaminophen (TYLENOL) 500 MG tablet Take 2 tablets by mouth 2 (Two) Times a Day As Needed for Mild Pain.      amLODIPine (NORVASC) 10 MG tablet TAKE 1 TABLET BY MOUTH EVERY DAY 90 tablet 3    aspirin 81 MG EC tablet Take 1 tablet by mouth Every Other Day.      calcium citrate-vitamin d (CALCITRATE) 315-250 MG-UNIT tablet tablet Take 2 tablets by mouth Daily.      Docusate Calcium (STOOL SOFTENER PO) 2 capsules Daily.      famotidine (Pepcid) 20 MG tablet Take 1 tablet by mouth 2 (Two) Times a Day. 60 tablet 5    FLUoxetine (PROzac) 20 MG capsule TAKE 1 CAPSULE BY MOUTH DAILY 90 capsule 0    fluticasone (FLONASE) 50 MCG/ACT nasal spray into each nostril daily.      Glucos-Chondroit-Hyaluron-MSM (GLUCOSAMINE CHONDROITIN JOINT PO) 2 tablets Daily.      hydroCHLOROthiazide (HYDRODIURIL) 25 MG tablet TAKE 1 TABLET BY MOUTH EVERY DAY 30 tablet 4    Loratadine 10 MG capsule Take  by mouth.      MAGNESIUM GLYCINATE PO Take 500 mg by mouth Daily.      montelukast (SINGULAIR) 10 MG tablet TAKE 1 TABLET BY MOUTH EVERY DAY 30 tablet 11    Multiple Vitamins-Minerals (MULTI VITAMIN/MINERALS PO) Take  by mouth daily.      OMEGA 3-6-9 FATTY ACIDS PO Take  by mouth.      Probiotic Product (ALIGN) 4 MG capsule 1 capsule Daily.      Triamcinolone Acetonide (NASACORT AQ NA) into the nostril(s) as directed by provider.      Turmeric 500 MG capsule Take 1 capsule by mouth Daily.      [DISCONTINUED] GARLIC OIL PO Take  by mouth.      [DISCONTINUED] Tirzepatide-Weight Management (ZEPBOUND) 7.5 MG/0.5ML solution auto-injector Inject 0.5 mL under the skin into the appropriate area as directed 1 (One) Time Per Week. (Patient not taking: Reported on 6/26/2024) 2 mL 0     No current facility-administered medications on file prior to visit.       Current outpatient and discharge medications  have been reconciled for the patient.  Reviewed by: Eun Mcmanus MD        The following portions of the patient's history were reviewed and updated as appropriate: allergies, current medications, past family history, past medical history, past social history, past surgical history, and problem list.    Review of Systems   Constitutional:  Negative for fatigue and unexpected weight change.   HENT:          Denies snoring.   Eyes:  Negative for visual disturbance.   Respiratory:  Positive for cough. Negative for shortness of breath and wheezing.    Cardiovascular:  Negative for chest pain, palpitations and leg swelling.        No MELISSA, orthopnea, or claudication.   Gastrointestinal:  Positive for constipation. Negative for abdominal pain, blood in stool, diarrhea, nausea and vomiting.        Denies melena.   Endocrine: Negative for polydipsia and polyuria.   Musculoskeletal:  Negative for arthralgias and myalgias.   Neurological:  Positive for dizziness and light-headedness. Negative for syncope and headaches.        No memory issues.   Psychiatric/Behavioral:  Positive for sleep disturbance (takes Mg for insomnia). Negative for decreased concentration. The patient is not nervous/anxious.         Denies depression.         Objective       Blood pressure 138/82, pulse 84, temperature 98 °F (36.7 °C), temperature source Infrared, resp. rate 18, weight 88.6 kg (195 lb 4 oz), not currently breastfeeding.  Body mass index is 33.51 kg/m².      Physical Exam  Vitals and nursing note reviewed.   Constitutional:       Appearance: She is well-developed.      Comments: BMI greater than 30.   Neck:      Thyroid: No thyroid mass or thyromegaly.      Vascular: No carotid bruit.   Cardiovascular:      Rate and Rhythm: Normal rate and regular rhythm.      Pulses: Normal pulses.      Heart sounds: Normal heart sounds. No murmur heard.     No friction rub. No gallop.   Pulmonary:      Effort: Pulmonary effort is normal.      Breath  sounds: Normal breath sounds.   Musculoskeletal:      Right lower leg: No edema.      Left lower leg: No edema.   Neurological:      Mental Status: She is alert.   Psychiatric:         Mood and Affect: Mood normal.       Results for orders placed or performed in visit on 06/26/24   POC Urinalysis Dipstick, Automated    Specimen: Urine   Result Value Ref Range    Color Yellow Yellow, Straw, Dark Yellow, Dede    Clarity, UA Clear Clear    Specific Gravity  1.015 1.005 - 1.030    pH, Urine 6.0 5.0 - 8.0    Leukocytes 75 Marti/ul (A) Negative    Nitrite, UA Negative Negative    Protein, POC Negative Negative mg/dL    Glucose, UA Negative Negative mg/dL    Ketones, UA Negative Negative    Urobilinogen, UA Normal Normal, 0.2 E.U./dL    Bilirubin Negative Negative    Blood, UA Negative Negative    Lot Number [id number]     Expiration Date 07/31/25    POC Glycosylated Hemoglobin (Hb A1C)    Specimen: Blood   Result Value Ref Range    Hemoglobin A1C 5.5 4.5 - 5.7 %    Lot Number [id number]     Expiration Date 3/18/26        Assessment / Plan:  Diagnoses and all orders for this visit:    1. Primary hypertension (Primary)  -     Lipid Panel  -     Comprehensive Metabolic Panel  -     CBC & Differential   Continue current medication(s) as noted in the history of present illness (blood pressure stable today).    2. Dyslipidemia  -     Lipid Panel  -     Comprehensive Metabolic Panel  -     CBC & Differential    3. Prediabetes  -     POC Glycosylated Hemoglobin (Hb A1C)  -     Lipid Panel  -     Comprehensive Metabolic Panel  -     CBC & Differential    4. Lung nodule, solitary - Ground Glass   Follow up per Pulmonary.    5. Obesity (BMI 30-39.9)  -     Lipid Panel  -     Comprehensive Metabolic Panel  -     CBC & Differential    6. Gastroesophageal reflux disease, unspecified whether esophagitis present   Continue current medication(s) as noted in the history of present illness.    7. Benign colonic polyp   Colonoscopy  up-to-date.    8. Seasonal and perennial allergic rhinitis   Continue current medication(s) as noted in the history of present illness.    9. Leukocytes in urine  -     POC Urinalysis Dipstick, Automated  -     Urine Culture - Urine, Urine, Clean Catch          The patient states her DEXA Scan is scheduled    I recommended Shingrix (Shingles vaccine) at the pharmacy.    I recommended Covid 19 bivalent vaccine and RSV vaccine at the pharmacy, September 2024.    Return in about 6 months (around 12/26/2024) for Annual physical, fasting.

## 2024-06-27 LAB — BACTERIA SPEC AEROBE CULT: NORMAL

## 2024-07-09 ENCOUNTER — OFFICE VISIT (OUTPATIENT)
Dept: PULMONOLOGY | Facility: CLINIC | Age: 64
End: 2024-07-09
Payer: COMMERCIAL

## 2024-07-09 VITALS
OXYGEN SATURATION: 95 % | HEIGHT: 64 IN | TEMPERATURE: 98 F | HEART RATE: 69 BPM | WEIGHT: 198 LBS | SYSTOLIC BLOOD PRESSURE: 138 MMHG | BODY MASS INDEX: 33.8 KG/M2 | DIASTOLIC BLOOD PRESSURE: 86 MMHG

## 2024-07-09 DIAGNOSIS — K21.9 CHRONIC GERD: ICD-10-CM

## 2024-07-09 DIAGNOSIS — R91.1 LUNG NODULE, SOLITARY: ICD-10-CM

## 2024-07-09 DIAGNOSIS — R91.1 PULMONARY NODULE: Primary | ICD-10-CM

## 2024-07-09 PROCEDURE — 99214 OFFICE O/P EST MOD 30 MIN: CPT | Performed by: INTERNAL MEDICINE

## 2024-07-09 NOTE — PROGRESS NOTES
PULMONARY  NOTE    Chief Complaint     Incidental groundglass nodule, history of breast cancer, perennial rhinitis, chronic GERD    History of Present Illness     63-year-old female returns today for follow-up  I last saw her 6/9/2023  Previously followed by Clarke Mason MD    Minimal smoking history  No past history of known lung disease    History of breast cancer treated with a lumpectomy in 2011  She did not require adjuvant chemo or radiation therapy  She has had no evidence of recurrence to date    She had a CT scan of the chest in 2021 which revealed a new groundglass opacity  A PET scan revealed no abnormal hypermetabolic activity  The nodule has been followed with serial chest imaging    The nodule was stable on her scan in May 2023 back to 2021  Therefore, our plan was to do a follow-up CT scan of the chest this spring which was performed and as noted below    She has a history of chronic reflux  Previously on a PPI which she has been trying to avoid because it made her feel bloated  She has been using Tums on a regular basis  She does not strictly follow reflux precautions    Patient Active Problem List   Diagnosis    History of left breast cancer    Lung nodule, solitary - Ground Glass    Allergic rhinitis    Anemia    Benign colonic polyp    Hypertension    Irritable bowel syndrome    Menopausal state    Sigmoid diverticulosis    Internal hemorrhoids    Hyperlipidemia    Seasonal and perennial allergic rhinitis    Prediabetes    Obesity (BMI 30-39.9)    Pulmonary nodule (RLL - new 6/2024)    Chronic GERD      Allergies   Allergen Reactions    Bupropion GI Intolerance    Lisinopril Cough    Penicillins Rash    Tamoxifen Other (See Comments)     Feeling hateful and mean     Levofloxacin Unknown - Low Severity    Valsartan Headache       Current Outpatient Medications:     acetaminophen (TYLENOL) 500 MG tablet, Take 2 tablets by mouth 2 (Two) Times a Day As Needed for Mild Pain., Disp: , Rfl:      amLODIPine (NORVASC) 10 MG tablet, TAKE 1 TABLET BY MOUTH EVERY DAY, Disp: 90 tablet, Rfl: 3    aspirin 81 MG EC tablet, Take 1 tablet by mouth Every Other Day., Disp: , Rfl:     calcium citrate-vitamin d (CALCITRATE) 315-250 MG-UNIT tablet tablet, Take 2 tablets by mouth Daily., Disp: , Rfl:     Docusate Calcium (STOOL SOFTENER PO), 2 capsules Daily., Disp: , Rfl:     FLUoxetine (PROzac) 20 MG capsule, TAKE 1 CAPSULE BY MOUTH DAILY, Disp: 90 capsule, Rfl: 0    fluticasone (FLONASE) 50 MCG/ACT nasal spray, into each nostril daily., Disp: , Rfl:     Glucos-Chondroit-Hyaluron-MSM (GLUCOSAMINE CHONDROITIN JOINT PO), 2 tablets Daily., Disp: , Rfl:     hydroCHLOROthiazide (HYDRODIURIL) 25 MG tablet, TAKE 1 TABLET BY MOUTH EVERY DAY, Disp: 30 tablet, Rfl: 4    Loratadine 10 MG capsule, Take  by mouth., Disp: , Rfl:     MAGNESIUM GLYCINATE PO, Take 500 mg by mouth Daily., Disp: , Rfl:     montelukast (SINGULAIR) 10 MG tablet, TAKE 1 TABLET BY MOUTH EVERY DAY, Disp: 30 tablet, Rfl: 11    Multiple Vitamins-Minerals (MULTI VITAMIN/MINERALS PO), Take  by mouth daily., Disp: , Rfl:     OMEGA 3-6-9 FATTY ACIDS PO, Take  by mouth., Disp: , Rfl:     Probiotic Product (ALIGN) 4 MG capsule, 1 capsule Daily., Disp: , Rfl:     Triamcinolone Acetonide (NASACORT AQ NA), into the nostril(s) as directed by provider., Disp: , Rfl:     Turmeric 500 MG capsule, Take 1 capsule by mouth Daily., Disp: , Rfl:     famotidine (Pepcid) 20 MG tablet, Take 1 tablet by mouth 2 (Two) Times a Day. (Patient not taking: Reported on 7/9/2024), Disp: 60 tablet, Rfl: 5  MEDICATION LIST AND ALLERGIES REVIEWED.    Family History   Problem Relation Age of Onset    Coronary artery disease Mother         50's    Diabetes Mother     Hyperlipidemia Mother     Hypertension Mother     Coronary artery disease Father         30's    Bipolar disorder Sister     Diabetes Sister     Hypertension Sister     Diabetes Sister     Hypertension Sister      Social History  "    Tobacco Use    Smoking status: Former     Current packs/day: 0.00     Average packs/day: 0.5 packs/day for 5.0 years (2.5 ttl pk-yrs)     Types: Cigarettes     Start date:      Quit date:      Years since quittin.5     Passive exposure: Past (childhood)    Smokeless tobacco: Never    Tobacco comments:     High school & college   Vaping Use    Vaping status: Never Used   Substance Use Topics    Alcohol use: Yes     Alcohol/week: 14.0 standard drinks of alcohol     Types: 14 Glasses of wine per week     Comment: 1-3 glasses of wine daily    Drug use: No     Social History     Social History Narrative    Not on file     FAMILY AND SOCIAL HISTORY REVIEWED.    Review of Systems  IF PRESENT REFER TO SCANNED ROS SHEET FROM SAME DATE  OTHERWISE ROS OBTAINED AND NON-CONTRIBUTORY OVER HPI.    /86   Pulse 69   Temp 98 °F (36.7 °C) (Infrared)   Ht 162.6 cm (64.02\")   Wt 89.8 kg (198 lb)   SpO2 95% Comment: room air at rest  BMI 33.97 kg/m²   Physical Exam  Vitals and nursing note reviewed.   Constitutional:       General: She is not in acute distress.     Appearance: She is well-developed. She is not diaphoretic.   HENT:      Head: Normocephalic and atraumatic.   Neck:      Thyroid: No thyromegaly.   Cardiovascular:      Rate and Rhythm: Normal rate and regular rhythm.      Heart sounds: Normal heart sounds. No murmur heard.  Pulmonary:      Effort: Pulmonary effort is normal.      Breath sounds: Normal breath sounds. No stridor.   Lymphadenopathy:      Cervical: No cervical adenopathy.      Upper Body:      Right upper body: No supraclavicular or epitrochlear adenopathy.      Left upper body: No supraclavicular or epitrochlear adenopathy.   Skin:     General: Skin is warm and dry.   Neurological:      Mental Status: She is alert and oriented to person, place, and time.   Psychiatric:         Behavior: Behavior normal.         Results     CT scan of the chest from 2024 reviewed on " PACS  Irregular pleural-based right lower lobe density, new in comparison to prior study of 5/24/2023    Immunization History   Administered Date(s) Administered    COVID-19 (PFIZER) BIVALENT 12+YRS 12/06/2022    COVID-19 (PFIZER) Purple Cap Monovalent 04/04/2021, 04/26/2021, 11/19/2021    Covid-19 (Pfizer) Gray Cap Monovalent 06/07/2022    Flu Vaccine Quad PF >36MO 10/04/2016, 10/07/2017    Fluzone (or Fluarix & Flulaval for VFC) >6mos 12/19/2018, 10/14/2019, 09/25/2020, 10/14/2021, 12/15/2023    Fluzone Quad >6mos (Multi-dose) 11/12/2022    Hepatitis A 12/19/2018, 06/28/2019    Tdap 03/21/2011, 10/14/2021     Problem List       ICD-10-CM ICD-9-CM   1. Pulmonary nodule (RLL - new 6/2024)  R91.1 793.11   2. Lung nodule, solitary - Ground Glass  R91.1 793.11   3. Chronic GERD  K21.9 530.81       Discussion     Reviewed her chest imaging on PACS  She has an irregular mostly solid pleural-based density in the right base  My suspicion is that this is inflammatory  She does have reflux for which she has to take Tums on a regular basis, previously on a PPI  She does not regularly follow reflux precautions    We discussed biopsy options if it came to that  Given the location of her rib, this is probably not amenable to CT FNA  Surgical lung biopsy is always a consideration but the most appropriate approach if we were to go that route would be navigational bronchoscopy, probably    At this point, since we would need a robotic protocol CT scan of the chest anyway if we were going to consider navigational bronchoscopy we just can get a follow-up CAT scan  I suggest waiting at least another couple weeks and getting a follow-up thin section CT scan of the chest    If the lesion is still there or has gotten worse then we will further consider biopsy options    In the meantime we discussed reflux precautions including strict n.p.o. 2 hours before going to bed at night and sleeping elevated    Moderate level of Medical Decision  Making complexity based on 1 undiagnosed new problem or 2 stable chronic conditions, independent interpretation of tests, and/or prescription drug management     Dominik Mason MD  Note electronically signed    CC: Eun Mcmanus MD

## 2024-07-10 DIAGNOSIS — R91.1 PULMONARY NODULE: Primary | ICD-10-CM

## 2024-07-24 ENCOUNTER — HOSPITAL ENCOUNTER (OUTPATIENT)
Dept: CT IMAGING | Facility: HOSPITAL | Age: 64
Discharge: HOME | End: 2024-07-24
Admitting: INTERNAL MEDICINE
Payer: COMMERCIAL

## 2024-07-24 DIAGNOSIS — R91.1 PULMONARY NODULE: ICD-10-CM

## 2024-07-24 PROCEDURE — 71250 CT THORAX DX C-: CPT

## 2024-07-26 ENCOUNTER — DOCUMENTATION (OUTPATIENT)
Dept: PULMONOLOGY | Facility: CLINIC | Age: 64
End: 2024-07-26
Payer: COMMERCIAL

## 2024-07-26 NOTE — PROGRESS NOTES
The patient CT of the scan of the chest was reviewed on PACS and is stable    I will review the scan with her and determine further follow-up when she comes to the office for her August meeting

## 2024-08-01 ENCOUNTER — TELEPHONE (OUTPATIENT)
Dept: INTERNAL MEDICINE | Facility: CLINIC | Age: 64
End: 2024-08-01
Payer: COMMERCIAL

## 2024-08-14 ENCOUNTER — OFFICE VISIT (OUTPATIENT)
Dept: PULMONOLOGY | Facility: CLINIC | Age: 64
End: 2024-08-14
Payer: COMMERCIAL

## 2024-08-14 VITALS
SYSTOLIC BLOOD PRESSURE: 128 MMHG | BODY MASS INDEX: 34.15 KG/M2 | DIASTOLIC BLOOD PRESSURE: 74 MMHG | TEMPERATURE: 97.2 F | HEIGHT: 64 IN | WEIGHT: 200 LBS | OXYGEN SATURATION: 98 % | HEART RATE: 71 BPM

## 2024-08-14 DIAGNOSIS — R91.1 PULMONARY NODULE: ICD-10-CM

## 2024-08-14 DIAGNOSIS — R91.1 LUNG NODULE, SOLITARY: Primary | ICD-10-CM

## 2024-08-14 PROCEDURE — 99214 OFFICE O/P EST MOD 30 MIN: CPT | Performed by: INTERNAL MEDICINE

## 2024-08-14 NOTE — PROGRESS NOTES
PULMONARY  NOTE    Chief Complaint     Incidental groundglass nodule, history of breast cancer, perennial rhinitis, chronic GERD, pulmonary nodules    History of Present Illness     64-year-old female returns today for follow-up  I saw her on 7/9/2024  Previously followed by Clarke Mason    Minimal smoking history with no history of chronic lung disease    History of breast cancer treated with a lumpectomy in 2011  No evidence of recurrence to date    In 2021 she had a new groundglass opacity on chest imaging  She has been followed with serial chest imaging  Things have been stable up through a scan earlier this year where she had a new cluster of small solid right lower lobe nodules  Her plan was to get a follow-up CT scan with robotic protocol in case we needed to do a biopsy    No exacerbation of respiratory symptoms since I saw her    Patient Active Problem List   Diagnosis    History of left breast cancer    Lung nodule, solitary - Ground Glass    Allergic rhinitis    Anemia    Benign colonic polyp    Hypertension    Irritable bowel syndrome    Menopausal state    Sigmoid diverticulosis    Internal hemorrhoids    Hyperlipidemia    Seasonal and perennial allergic rhinitis    Prediabetes    Obesity (BMI 30-39.9)    Pulmonary nodule (RLL - new 6/2024)    Chronic GERD      Allergies   Allergen Reactions    Bupropion GI Intolerance    Lisinopril Cough    Penicillins Rash    Tamoxifen Other (See Comments)     Feeling hateful and mean     Levofloxacin Unknown - Low Severity    Valsartan Headache       Current Outpatient Medications:     acetaminophen (TYLENOL) 500 MG tablet, Take 2 tablets by mouth 2 (Two) Times a Day As Needed for Mild Pain., Disp: , Rfl:     amLODIPine (NORVASC) 10 MG tablet, TAKE 1 TABLET BY MOUTH EVERY DAY, Disp: 90 tablet, Rfl: 3    aspirin 81 MG EC tablet, Take 1 tablet by mouth Every Other Day., Disp: , Rfl:     calcium citrate-vitamin d (CALCITRATE) 315-250 MG-UNIT tablet tablet, Take 2  tablets by mouth Daily., Disp: , Rfl:     Docusate Calcium (STOOL SOFTENER PO), 2 capsules Daily., Disp: , Rfl:     FLUoxetine (PROzac) 20 MG capsule, TAKE 1 CAPSULE BY MOUTH DAILY, Disp: 90 capsule, Rfl: 0    fluticasone (FLONASE) 50 MCG/ACT nasal spray, into each nostril daily., Disp: , Rfl:     Glucos-Chondroit-Hyaluron-MSM (GLUCOSAMINE CHONDROITIN JOINT PO), 2 tablets Daily., Disp: , Rfl:     hydroCHLOROthiazide (HYDRODIURIL) 25 MG tablet, TAKE 1 TABLET BY MOUTH EVERY DAY, Disp: 30 tablet, Rfl: 4    Loratadine 10 MG capsule, Take  by mouth., Disp: , Rfl:     MAGNESIUM GLYCINATE PO, Take 500 mg by mouth Daily., Disp: , Rfl:     montelukast (SINGULAIR) 10 MG tablet, TAKE 1 TABLET BY MOUTH EVERY DAY, Disp: 30 tablet, Rfl: 11    Multiple Vitamins-Minerals (MULTI VITAMIN/MINERALS PO), Take  by mouth daily., Disp: , Rfl:     OMEGA 3-6-9 FATTY ACIDS PO, Take  by mouth., Disp: , Rfl:     Probiotic Product (ALIGN) 4 MG capsule, 1 capsule Daily., Disp: , Rfl:     Triamcinolone Acetonide (NASACORT AQ NA), into the nostril(s) as directed by provider., Disp: , Rfl:     Turmeric 500 MG capsule, Take 1 capsule by mouth Daily., Disp: , Rfl:     famotidine (Pepcid) 20 MG tablet, Take 1 tablet by mouth 2 (Two) Times a Day. (Patient not taking: Reported on 2024), Disp: 60 tablet, Rfl: 5  MEDICATION LIST AND ALLERGIES REVIEWED.    Family History   Problem Relation Age of Onset    Coronary artery disease Mother         50's    Diabetes Mother     Hyperlipidemia Mother     Hypertension Mother     Coronary artery disease Father         30's    Bipolar disorder Sister     Diabetes Sister     Hypertension Sister     Diabetes Sister     Hypertension Sister      Social History     Tobacco Use    Smoking status: Former     Current packs/day: 0.00     Average packs/day: 0.5 packs/day for 5.0 years (2.5 ttl pk-yrs)     Types: Cigarettes     Start date:      Quit date:      Years since quittin.6     Passive exposure: Past  "(childhood)    Smokeless tobacco: Never    Tobacco comments:     High school & college   Vaping Use    Vaping status: Never Used   Substance Use Topics    Alcohol use: Yes     Alcohol/week: 14.0 standard drinks of alcohol     Types: 14 Glasses of wine per week     Comment: 1-3 glasses of wine daily    Drug use: No     Social History     Social History Narrative    Not on file     FAMILY AND SOCIAL HISTORY REVIEWED.    Review of Systems  IF PRESENT REFER TO SCANNED ROS SHEET FROM SAME DATE  OTHERWISE ROS OBTAINED AND NON-CONTRIBUTORY OVER HPI.    /74   Pulse 71   Temp 97.2 °F (36.2 °C)   Ht 162.6 cm (64.02\")   Wt 90.7 kg (200 lb)   SpO2 98% Comment: room air at rest  BMI 34.31 kg/m²   Physical Exam  Vitals and nursing note reviewed.   Constitutional:       General: She is not in acute distress.     Appearance: She is well-developed. She is not diaphoretic.   HENT:      Head: Normocephalic and atraumatic.   Neck:      Thyroid: No thyromegaly.   Cardiovascular:      Rate and Rhythm: Normal rate and regular rhythm.      Heart sounds: Normal heart sounds. No murmur heard.  Pulmonary:      Effort: Pulmonary effort is normal.      Breath sounds: Normal breath sounds. No stridor.   Abdominal:      General: Bowel sounds are normal.   Lymphadenopathy:      Cervical: No cervical adenopathy.      Upper Body:      Right upper body: No supraclavicular or epitrochlear adenopathy.      Left upper body: No supraclavicular or epitrochlear adenopathy.   Skin:     General: Skin is warm and dry.   Neurological:      Mental Status: She is alert and oriented to person, place, and time.   Psychiatric:         Behavior: Behavior normal.         Results     CT scan of the chest reviewed on PACS  Stable calcified densities, groundglass nodule, and right lower lobe cluster of nodules    Immunization History   Administered Date(s) Administered    COVID-19 (PFIZER) BIVALENT 12+YRS 12/06/2022    COVID-19 (PFIZER) Purple Cap Monovalent " 04/04/2021, 04/26/2021, 11/19/2021    Covid-19 (Pfizer) Gray Cap Monovalent 06/07/2022    Flu Vaccine Quad PF >36MO 10/04/2016, 10/07/2017    Fluzone (or Fluarix & Flulaval for VFC) >6mos 12/19/2018, 10/14/2019, 09/25/2020, 10/14/2021, 12/15/2023    Fluzone Quad >6mos (Multi-dose) 11/12/2022    Hepatitis A 12/19/2018, 06/28/2019    Tdap 03/21/2011, 10/14/2021     Problem List       ICD-10-CM ICD-9-CM   1. Lung nodule, solitary - Ground Glass  R91.1 793.11   2. Pulmonary nodule (RLL - new 6/2024)  R91.1 793.11       Discussion     Reviewed her chest imaging together on PACS  No change in comparison to the previous CT scan  She still has the new cluster of small nodules on the right base  No regular respiratory symptoms    No acceleration of reflux symptoms    At this point our plan is just to follow-up with a repeat CT scan of the chest in 3-4 months  If there is been any radiographic progression then we will consider a biopsy at that time    Moderate level of Medical Decision Making complexity based on 1 undiagnosed new problem or 2 stable chronic conditions, independent interpretation of tests, and/or prescription drug management     Dominik Mason MD  Note electronically signed    CC: Eun Mcmanus MD

## 2024-08-15 DIAGNOSIS — R91.1 LUNG NODULE, SOLITARY: Primary | ICD-10-CM

## 2024-08-23 ENCOUNTER — TELEPHONE (OUTPATIENT)
Dept: INTERNAL MEDICINE | Facility: CLINIC | Age: 64
End: 2024-08-23
Payer: COMMERCIAL

## 2024-08-23 NOTE — TELEPHONE ENCOUNTER
Caller: Gaby Ratliff    Relationship: Self    Best call back number: [phone]       What was the call regarding: PATIENT STATES THAT HER GYNECOLOGIST DID A VAGINAL ULTRASOUND AND FOUND NOTHING AND SUGGESTED THAT SHE MIGHT HAVE GI PROBLEMS SO SHE WANTS DR YANG TO CALL BACKT O DISCUSS NEXT STEPS    Is it okay if the provider responds through MyChart:

## 2024-08-26 NOTE — TELEPHONE ENCOUNTER
Call patient please.    I would like more information on what prompted the gynecologist to do an ultrasound.  Also specific GI symptoms is she having?

## 2024-08-26 NOTE — TELEPHONE ENCOUNTER
Called patient and read providers message.  She stated that she has had bad gas off and on for the last 3 months and will also have uterine/lower abdominal pain, bloating, and aching. She described the feeling similar to labor pains. They did the Vaginal US and ruled that normal. The GYN is referring patient to talk to PCP because she believes this is a GI problem.   She increased her stool softener medication to 3 tablets daily instead of 2 and is taking a fiber supplement. She is wondering if the feeling she is having could be colon spasms?  Does patient need to make an appointment with Dr. Mcmanus first to evaluate or does she believe this should be referred to a GI specialist ?    She has seen Gastroenterology in the past. Dr. Joya Guillaume but she has moved to [place]. It looks like she used to practice at [facility].

## 2024-08-26 NOTE — TELEPHONE ENCOUNTER
Called patient, unable to reach, and left vm.    RELAY:   I would like more information on what prompted the gynecologist to do an ultrasound.  Also specific GI symptoms is she having?

## 2024-08-26 NOTE — TELEPHONE ENCOUNTER
MESSAGE HAS BEEN READ TO PATIENT AND IS PATIENT IS WANTING TO DISCUSS WITH CLINICAL STAFF.    CALL BACK NUMBER [phone]

## 2024-08-26 NOTE — TELEPHONE ENCOUNTER
It would probably take a while to get into see a gastroenterologist, so I would recommend an appointment with me here first

## 2024-09-02 DIAGNOSIS — I10 PRIMARY HYPERTENSION: ICD-10-CM

## 2024-09-03 RX ORDER — HYDROCHLOROTHIAZIDE 25 MG/1
25 TABLET ORAL DAILY
Qty: 30 TABLET | Refills: 4 | Status: SHIPPED | OUTPATIENT
Start: 2024-09-03

## 2024-09-04 ENCOUNTER — OFFICE VISIT (OUTPATIENT)
Dept: INTERNAL MEDICINE | Facility: CLINIC | Age: 64
End: 2024-09-04
Payer: COMMERCIAL

## 2024-09-04 VITALS
SYSTOLIC BLOOD PRESSURE: 136 MMHG | RESPIRATION RATE: 16 BRPM | TEMPERATURE: 97.8 F | WEIGHT: 199.25 LBS | BODY MASS INDEX: 34.18 KG/M2 | HEART RATE: 72 BPM | DIASTOLIC BLOOD PRESSURE: 82 MMHG

## 2024-09-04 DIAGNOSIS — Z12.11 SCREENING FOR COLON CANCER: ICD-10-CM

## 2024-09-04 DIAGNOSIS — R82.998 LEUKOCYTES IN URINE: ICD-10-CM

## 2024-09-04 DIAGNOSIS — R10.30 LOWER ABDOMINAL PAIN: Primary | ICD-10-CM

## 2024-09-04 LAB
BILIRUB BLD-MCNC: NEGATIVE MG/DL
CLARITY, POC: ABNORMAL
COLOR UR: YELLOW
EXPIRATION DATE: ABNORMAL
GLUCOSE UR STRIP-MCNC: NEGATIVE MG/DL
KETONES UR QL: NEGATIVE
LEUKOCYTE EST, POC: ABNORMAL
Lab: ABNORMAL
NITRITE UR-MCNC: NEGATIVE MG/ML
PH UR: 6 [PH] (ref 5–8)
PROT UR STRIP-MCNC: NEGATIVE MG/DL
RBC # UR STRIP: NEGATIVE /UL
SP GR UR: 1.02 (ref 1–1.03)
UROBILINOGEN UR QL: NORMAL

## 2024-09-04 PROCEDURE — 99214 OFFICE O/P EST MOD 30 MIN: CPT | Performed by: INTERNAL MEDICINE

## 2024-09-04 PROCEDURE — 87086 URINE CULTURE/COLONY COUNT: CPT | Performed by: INTERNAL MEDICINE

## 2024-09-04 PROCEDURE — 85025 COMPLETE CBC W/AUTO DIFF WBC: CPT | Performed by: INTERNAL MEDICINE

## 2024-09-04 PROCEDURE — 81003 URINALYSIS AUTO W/O SCOPE: CPT | Performed by: INTERNAL MEDICINE

## 2024-09-04 PROCEDURE — 80053 COMPREHEN METABOLIC PANEL: CPT | Performed by: INTERNAL MEDICINE

## 2024-09-04 PROCEDURE — 85652 RBC SED RATE AUTOMATED: CPT | Performed by: INTERNAL MEDICINE

## 2024-09-04 NOTE — PROGRESS NOTES
"Subjective       Gaby Ratliff is a 64 y.o. female.     Chief Complaint   Patient presents with    GI Problem       History obtained from the patient.       She has seen Gastroenterology in the past. Dr. Joya Guillaume but she has moved to [place]. It looks like she used to practice at [facility].     4 cecal polyps - bx normal except focal acute colitis (no polyps))   reflux esophagitis, gastritis, and gastric polyp       Abdominal Pain  This is a new problem. Episode onset: May 2024. Onset quality: wakes up with the pain. The problem occurs intermittently (2 episodes, last 3 weeks ago). The most recent episode lasted 4 days. The pain is located in the suprapubic region. The pain is moderate. Quality: pressure. Associated symptoms include constipation and flatus. Pertinent negatives include no arthralgias, diarrhea, dysuria, fever, frequency, hematuria, myalgias, nausea or vomiting. Associated symptoms comments: \"feels like insides are falling out\". Nothing aggravates the pain. The pain is relieved by Being still and recumbency. She has tried acetaminophen for the symptoms. The treatment provided moderate relief. Prior workup: Pelvic u/s as above. Her past medical history is significant for abdominal surgery (h/o appendectomy) and irritable bowel syndrome. There is no history of colon cancer, gallstones, GERD or ulcerative colitis. h/o Sigmoid Diverticulosis      Of note, she has seen Dr. Lozano in the past for IBS, 27 to 28-year duration.  She was diagnosed with fissures at age 19.  She has been on Prozac for the IBS with good control of her symptoms (alternating constipation with loose stools).  She does states she stopped taking fiber gummies at the beginning of May 2024.  She now takes them intermittently. She has increased her stool softener medication to 3 tablets daily instead of 2 and is taking a fiber supplement.  She states her stools are loose now.    Her last Pap was 8/10/2023, " normal per patient report (GYN).  She has history of Colon Polyps.  On 4/13/2011, she had 4 cecal polyps.  Random biopsies were consistent with focal acute colitis.  EGD on 4/13/2011 showed reflux esophagitis, gastritis, and a gastric polyp.  Colonoscopy on 12/13/2017 showed internal hemorrhoids and sigmoid diverticulosis.      The following portions of the patient's history were reviewed and updated as appropriate: allergies, current medications, past family history, past medical history, past social history, past surgical history, and problem list.      Review of Systems   Constitutional:  Positive for fatigue. Negative for chills, fever and unexpected weight change.   Gastrointestinal:  Positive for abdominal pain, constipation and flatus. Negative for blood in stool, diarrhea, nausea, rectal pain and vomiting.        No melena   Genitourinary:  Positive for pelvic pain. Negative for dysuria, flank pain, frequency, hematuria, urgency, vaginal bleeding and vaginal discharge.   Musculoskeletal:  Negative for arthralgias, joint swelling and myalgias.   Skin:  Negative for rash.   Hematological:  Negative for adenopathy.           Objective     Blood pressure 136/82, pulse 72, temperature 97.8 °F (36.6 °C), temperature source Infrared, resp. rate 16, weight 90.4 kg (199 lb 4 oz), not currently breastfeeding.    Physical Exam  Vitals and nursing note reviewed.   Constitutional:       Appearance: She is well-developed.      Comments: BMI greater than 30   Cardiovascular:      Rate and Rhythm: Normal rate and regular rhythm.      Heart sounds: Normal heart sounds. No murmur heard.  Pulmonary:      Effort: Pulmonary effort is normal.      Breath sounds: Normal breath sounds.   Abdominal:      General: Bowel sounds are normal. There is no distension.      Palpations: Abdomen is soft. There is no hepatomegaly, splenomegaly or mass.      Tenderness: There is abdominal tenderness (mild suprapubic). There is no right CVA  tenderness, left CVA tenderness, guarding or rebound.   Skin:     Findings: No rash.   Neurological:      Mental Status: She is alert.   Psychiatric:         Mood and Affect: Mood normal.         Results for orders placed or performed in visit on 09/04/24   POC Urinalysis Dipstick, Automated    Specimen: Urine   Result Value Ref Range    Color Yellow Yellow, Straw, Dark Yellow, Dede    Clarity, UA Cloudy (A) Clear    Specific Gravity  1.020 1.005 - 1.030    pH, Urine 6.0 5.0 - 8.0    Leukocytes 75 Marti/ul (A) Negative    Nitrite, UA Negative Negative    Protein, POC Negative Negative mg/dL    Glucose, UA Negative Negative mg/dL    Ketones, UA Negative Negative    Urobilinogen, UA Normal Normal, 0.2 E.U./dL    Bilirubin Negative Negative    Blood, UA Negative Negative    Lot Number [id number]     Expiration Date 3/31/25        Assessment & Plan   Diagnoses and all orders for this visit:    1. Lower abdominal pain (Primary)  -     POC Urinalysis Dipstick, Automated  -     Comprehensive Metabolic Panel  -     Sedimentation Rate  -     CBC & Differential  -     Ambulatory Referral For Screening Colonoscopy    2. Screening for colon cancer  -     Ambulatory Referral For Screening Colonoscopy    3. Leukocytes in urine  -     Urine Culture - Urine, Urine, Clean Catch          Return for Next scheduled follow up.

## 2024-09-05 LAB
ALBUMIN SERPL-MCNC: 4.8 G/DL (ref 3.5–5.2)
ALBUMIN/GLOB SERPL: 1.8 G/DL
ALP SERPL-CCNC: 73 U/L (ref 39–117)
ALT SERPL W P-5'-P-CCNC: 24 U/L (ref 1–33)
ANION GAP SERPL CALCULATED.3IONS-SCNC: 15 MMOL/L (ref 5–15)
AST SERPL-CCNC: 19 U/L (ref 1–32)
BACTERIA SPEC AEROBE CULT: NO GROWTH
BASOPHILS # BLD AUTO: 0.08 10*3/MM3 (ref 0–0.2)
BASOPHILS NFR BLD AUTO: 1.2 % (ref 0–1.5)
BILIRUB SERPL-MCNC: 0.2 MG/DL (ref 0–1.2)
BUN SERPL-MCNC: 14 MG/DL (ref 8–23)
BUN/CREAT SERPL: 15.7 (ref 7–25)
CALCIUM SPEC-SCNC: 9.8 MG/DL (ref 8.6–10.5)
CHLORIDE SERPL-SCNC: 101 MMOL/L (ref 98–107)
CO2 SERPL-SCNC: 23 MMOL/L (ref 22–29)
CREAT SERPL-MCNC: 0.89 MG/DL (ref 0.57–1)
DEPRECATED RDW RBC AUTO: 45.3 FL (ref 37–54)
EGFRCR SERPLBLD CKD-EPI 2021: 73 ML/MIN/1.73
EOSINOPHIL # BLD AUTO: 0.24 10*3/MM3 (ref 0–0.4)
EOSINOPHIL NFR BLD AUTO: 3.6 % (ref 0.3–6.2)
ERYTHROCYTE [DISTWIDTH] IN BLOOD BY AUTOMATED COUNT: 13 % (ref 12.3–15.4)
ERYTHROCYTE [SEDIMENTATION RATE] IN BLOOD: 23 MM/HR (ref 0–30)
GLOBULIN UR ELPH-MCNC: 2.7 GM/DL
GLUCOSE SERPL-MCNC: 106 MG/DL (ref 65–99)
HCT VFR BLD AUTO: 40.2 % (ref 34–46.6)
HGB BLD-MCNC: 13.1 G/DL (ref 12–15.9)
IMM GRANULOCYTES # BLD AUTO: 0.02 10*3/MM3 (ref 0–0.05)
IMM GRANULOCYTES NFR BLD AUTO: 0.3 % (ref 0–0.5)
LYMPHOCYTES # BLD AUTO: 1.97 10*3/MM3 (ref 0.7–3.1)
LYMPHOCYTES NFR BLD AUTO: 29.8 % (ref 19.6–45.3)
MCH RBC QN AUTO: 31.1 PG (ref 26.6–33)
MCHC RBC AUTO-ENTMCNC: 32.6 G/DL (ref 31.5–35.7)
MCV RBC AUTO: 95.5 FL (ref 79–97)
MONOCYTES # BLD AUTO: 0.48 10*3/MM3 (ref 0.1–0.9)
MONOCYTES NFR BLD AUTO: 7.3 % (ref 5–12)
NEUTROPHILS NFR BLD AUTO: 3.83 10*3/MM3 (ref 1.7–7)
NEUTROPHILS NFR BLD AUTO: 57.8 % (ref 42.7–76)
NRBC BLD AUTO-RTO: 0 /100 WBC (ref 0–0.2)
PLATELET # BLD AUTO: 382 10*3/MM3 (ref 140–450)
PMV BLD AUTO: 10.9 FL (ref 6–12)
POTASSIUM SERPL-SCNC: 3.7 MMOL/L (ref 3.5–5.2)
PROT SERPL-MCNC: 7.5 G/DL (ref 6–8.5)
RBC # BLD AUTO: 4.21 10*6/MM3 (ref 3.77–5.28)
SODIUM SERPL-SCNC: 139 MMOL/L (ref 136–145)
WBC NRBC COR # BLD AUTO: 6.62 10*3/MM3 (ref 3.4–10.8)

## 2024-09-10 ENCOUNTER — TRANSCRIBE ORDERS (OUTPATIENT)
Dept: ADMINISTRATIVE | Facility: HOSPITAL | Age: 64
End: 2024-09-10
Payer: COMMERCIAL

## 2024-09-10 DIAGNOSIS — J32.9 CHRONIC SINUSITIS, UNSPECIFIED LOCATION: Primary | ICD-10-CM

## 2024-11-13 DIAGNOSIS — K58.9 IRRITABLE BOWEL SYNDROME WITHOUT DIARRHEA: ICD-10-CM

## 2024-12-12 ENCOUNTER — HOSPITAL ENCOUNTER (OUTPATIENT)
Dept: CT IMAGING | Facility: HOSPITAL | Age: 64
Discharge: HOME | End: 2024-12-12
Admitting: INTERNAL MEDICINE
Payer: COMMERCIAL

## 2024-12-12 DIAGNOSIS — R91.1 LUNG NODULE, SOLITARY: ICD-10-CM

## 2024-12-12 PROCEDURE — 71250 CT THORAX DX C-: CPT

## 2024-12-16 ENCOUNTER — TELEPHONE (OUTPATIENT)
Dept: INTERNAL MEDICINE | Facility: CLINIC | Age: 64
End: 2024-12-16

## 2024-12-16 ENCOUNTER — OFFICE VISIT (OUTPATIENT)
Dept: INTERNAL MEDICINE | Facility: CLINIC | Age: 64
End: 2024-12-16
Payer: COMMERCIAL

## 2024-12-16 ENCOUNTER — DOCUMENTATION (OUTPATIENT)
Dept: PULMONOLOGY | Facility: CLINIC | Age: 64
End: 2024-12-16
Payer: COMMERCIAL

## 2024-12-16 VITALS
BODY MASS INDEX: 33.67 KG/M2 | DIASTOLIC BLOOD PRESSURE: 90 MMHG | HEIGHT: 64 IN | RESPIRATION RATE: 18 BRPM | WEIGHT: 197.2 LBS | HEART RATE: 74 BPM | TEMPERATURE: 99.1 F | SYSTOLIC BLOOD PRESSURE: 130 MMHG

## 2024-12-16 DIAGNOSIS — R73.03 PREDIABETES: ICD-10-CM

## 2024-12-16 DIAGNOSIS — E78.5 DYSLIPIDEMIA: ICD-10-CM

## 2024-12-16 DIAGNOSIS — I10 PRIMARY HYPERTENSION: ICD-10-CM

## 2024-12-16 DIAGNOSIS — J06.9 UPPER RESPIRATORY TRACT INFECTION, UNSPECIFIED TYPE: ICD-10-CM

## 2024-12-16 DIAGNOSIS — K21.9 CHRONIC GERD: ICD-10-CM

## 2024-12-16 DIAGNOSIS — Z00.00 ENCOUNTER FOR HEALTH MAINTENANCE EXAMINATION IN ADULT: Primary | ICD-10-CM

## 2024-12-16 DIAGNOSIS — E66.9 OBESITY (BMI 30-39.9): ICD-10-CM

## 2024-12-16 DIAGNOSIS — K63.5 BENIGN COLONIC POLYP: ICD-10-CM

## 2024-12-16 DIAGNOSIS — R05.1 ACUTE COUGH: ICD-10-CM

## 2024-12-16 DIAGNOSIS — R91.1 LUNG NODULE, SOLITARY: ICD-10-CM

## 2024-12-16 LAB
ALBUMIN SERPL-MCNC: 4.5 G/DL (ref 3.5–5.2)
ALBUMIN/GLOB SERPL: 1.4 G/DL
ALP SERPL-CCNC: 71 U/L (ref 39–117)
ALT SERPL W P-5'-P-CCNC: 31 U/L (ref 1–33)
ANION GAP SERPL CALCULATED.3IONS-SCNC: 15.3 MMOL/L (ref 5–15)
AST SERPL-CCNC: 31 U/L (ref 1–32)
BASOPHILS # BLD AUTO: 0.06 10*3/MM3 (ref 0–0.2)
BASOPHILS NFR BLD AUTO: 0.9 % (ref 0–1.5)
BILIRUB SERPL-MCNC: 0.3 MG/DL (ref 0–1.2)
BUN SERPL-MCNC: 13 MG/DL (ref 8–23)
BUN/CREAT SERPL: 12.3 (ref 7–25)
CALCIUM SPEC-SCNC: 9.9 MG/DL (ref 8.6–10.5)
CHLORIDE SERPL-SCNC: 101 MMOL/L (ref 98–107)
CHOLEST SERPL-MCNC: 238 MG/DL (ref 0–200)
CO2 SERPL-SCNC: 23.7 MMOL/L (ref 22–29)
CREAT SERPL-MCNC: 1.06 MG/DL (ref 0.57–1)
DEPRECATED RDW RBC AUTO: 43.4 FL (ref 37–54)
EGFRCR SERPLBLD CKD-EPI 2021: 59.1 ML/MIN/1.73
EOSINOPHIL # BLD AUTO: 0.33 10*3/MM3 (ref 0–0.4)
EOSINOPHIL NFR BLD AUTO: 5.1 % (ref 0.3–6.2)
ERYTHROCYTE [DISTWIDTH] IN BLOOD BY AUTOMATED COUNT: 12.8 % (ref 12.3–15.4)
EXPIRATION DATE: NORMAL
EXPIRATION DATE: NORMAL
FLUAV AG UPPER RESP QL IA.RAPID: NOT DETECTED
FLUBV AG UPPER RESP QL IA.RAPID: NOT DETECTED
GLOBULIN UR ELPH-MCNC: 3.3 GM/DL
GLUCOSE SERPL-MCNC: 123 MG/DL (ref 65–99)
HBA1C MFR BLD: 5.9 % (ref 4.8–5.6)
HCT VFR BLD AUTO: 42.8 % (ref 34–46.6)
HDLC SERPL-MCNC: 56 MG/DL (ref 40–60)
HGB BLD-MCNC: 14.7 G/DL (ref 12–15.9)
IMM GRANULOCYTES # BLD AUTO: 0.01 10*3/MM3 (ref 0–0.05)
IMM GRANULOCYTES NFR BLD AUTO: 0.2 % (ref 0–0.5)
INTERNAL CONTROL: NORMAL
INTERNAL CONTROL: NORMAL
LDLC SERPL CALC-MCNC: 135 MG/DL (ref 0–100)
LDLC/HDLC SERPL: 2.3 {RATIO}
LYMPHOCYTES # BLD AUTO: 0.64 10*3/MM3 (ref 0.7–3.1)
LYMPHOCYTES NFR BLD AUTO: 9.9 % (ref 19.6–45.3)
Lab: NORMAL
Lab: NORMAL
MCH RBC QN AUTO: 31.7 PG (ref 26.6–33)
MCHC RBC AUTO-ENTMCNC: 34.3 G/DL (ref 31.5–35.7)
MCV RBC AUTO: 92.4 FL (ref 79–97)
MONOCYTES # BLD AUTO: 0.5 10*3/MM3 (ref 0.1–0.9)
MONOCYTES NFR BLD AUTO: 7.7 % (ref 5–12)
NEUTROPHILS NFR BLD AUTO: 4.94 10*3/MM3 (ref 1.7–7)
NEUTROPHILS NFR BLD AUTO: 76.2 % (ref 42.7–76)
NRBC BLD AUTO-RTO: 0 /100 WBC (ref 0–0.2)
PLATELET # BLD AUTO: 295 10*3/MM3 (ref 140–450)
PMV BLD AUTO: 10.9 FL (ref 6–12)
POTASSIUM SERPL-SCNC: 4.2 MMOL/L (ref 3.5–5.2)
PROT SERPL-MCNC: 7.8 G/DL (ref 6–8.5)
RBC # BLD AUTO: 4.63 10*6/MM3 (ref 3.77–5.28)
S PYO AG THROAT QL: NEGATIVE
SARS-COV-2 AG UPPER RESP QL IA.RAPID: NOT DETECTED
SODIUM SERPL-SCNC: 140 MMOL/L (ref 136–145)
TRIGL SERPL-MCNC: 265 MG/DL (ref 0–150)
TSH SERPL DL<=0.05 MIU/L-ACNC: 2.38 UIU/ML (ref 0.27–4.2)
VLDLC SERPL-MCNC: 47 MG/DL (ref 5–40)
WBC NRBC COR # BLD AUTO: 6.48 10*3/MM3 (ref 3.4–10.8)

## 2024-12-16 PROCEDURE — 87880 STREP A ASSAY W/OPTIC: CPT | Performed by: INTERNAL MEDICINE

## 2024-12-16 PROCEDURE — 99396 PREV VISIT EST AGE 40-64: CPT | Performed by: INTERNAL MEDICINE

## 2024-12-16 PROCEDURE — 99214 OFFICE O/P EST MOD 30 MIN: CPT | Performed by: INTERNAL MEDICINE

## 2024-12-16 PROCEDURE — 80061 LIPID PANEL: CPT | Performed by: INTERNAL MEDICINE

## 2024-12-16 PROCEDURE — 83036 HEMOGLOBIN GLYCOSYLATED A1C: CPT | Performed by: INTERNAL MEDICINE

## 2024-12-16 PROCEDURE — 80050 GENERAL HEALTH PANEL: CPT | Performed by: INTERNAL MEDICINE

## 2024-12-16 PROCEDURE — 87428 SARSCOV & INF VIR A&B AG IA: CPT | Performed by: INTERNAL MEDICINE

## 2024-12-16 RX ORDER — CEFDINIR 300 MG/1
300 CAPSULE ORAL 2 TIMES DAILY
Qty: 20 CAPSULE | Refills: 0 | Status: SHIPPED | OUTPATIENT
Start: 2024-12-16 | End: 2024-12-26

## 2024-12-16 NOTE — TELEPHONE ENCOUNTER
PATIENT CALLED BACK. SHE STATES THAT THE ANTIBIOTIC THAT WAS SUPPOSED TO HAVE BEEN SENT IN THIS MORNING HAS STILL NOT BEEN SENT IN. PLEASE FOLLOW UP.

## 2024-12-16 NOTE — PROGRESS NOTES
CT scan of the chest recently performed and reviewed on PACS.  Roughly stable or slightly improved nodular opacities.  Will continue to follow with serial chest imaging.  Will discuss with the patient when she comes back for her office visit later on this week

## 2024-12-16 NOTE — TELEPHONE ENCOUNTER
Caller: Gaby Ratliff    Relationship: Self    Best call back number: [phone]         Who are you requesting to speak with (clinical staff, provider,  specific staff member): DR YANG NURSE        What was the call regarding: PATIENT WANTS TO TALK TO DR YANG'S NURSE AND DID NOT STATE WHY    Is it okay if the provider responds through MyChart:

## 2024-12-16 NOTE — PATIENT INSTRUCTIONS
I recommend Shingrix (Shingles vaccine) and consider RSV vaccine, at the pharmacy, as we discussed.    Health Maintenance for Postmenopausal Women  Menopause is a normal process in which your ability to get pregnant comes to an end. This process happens slowly over many months or years, usually between the ages of 48 and 55. Menopause is complete when you have missed your menstrual period for 12 months.  It is important to talk with your health care provider about some of the most common conditions that affect women after menopause (postmenopausal women). These include heart disease, cancer, and bone loss (osteoporosis). Adopting a healthy lifestyle and getting preventive care can help to promote your health and wellness. The actions you take can also lower your chances of developing some of these common conditions.  What are the signs and symptoms of menopause?  During menopause, you may have the following symptoms:  Hot flashes. These can be moderate or severe.  Night sweats.  Decrease in sex drive.  Mood swings.  Headaches.  Tiredness (fatigue).  Irritability.  Memory problems.  Problems falling asleep or staying asleep.  Talk with your health care provider about treatment options for your symptoms.  Do I need hormone replacement therapy?  Hormone replacement therapy is effective in treating symptoms that are caused by menopause, such as hot flashes and night sweats.  Hormone replacement carries certain risks, especially as you become older. If you are thinking about using estrogen or estrogen with progestin, discuss the benefits and risks with your health care provider.  How can I reduce my risk for heart disease and stroke?  The risk of heart disease, heart attack, and stroke increases as you age. One of the causes may be a change in the body's hormones during menopause. This can affect how your body uses dietary fats, triglycerides, and cholesterol. Heart attack and stroke are medical emergencies. There are  many things that you can do to help prevent heart disease and stroke.  Watch your blood pressure  High blood pressure causes heart disease and increases the risk of stroke. This is more likely to develop in people who have high blood pressure readings or are overweight.  Have your blood pressure checked:  Every 3-5 years if you are 18-39 years of age.  Every year if you are 40 years old or older.  Eat a healthy diet    Eat a diet that includes plenty of vegetables, fruits, low-fat dairy products, and lean protein.  Do not eat a lot of foods that are high in solid fats, added sugars, or sodium.  Get regular exercise  Get regular exercise. This is one of the most important things you can do for your health. Most adults should:  Try to exercise for at least 150 minutes each week. The exercise should increase your heart rate and make you sweat (moderate-intensity exercise).  Try to do strengthening exercises at least twice each week. Do these in addition to the moderate-intensity exercise.  Spend less time sitting. Even light physical activity can be beneficial.  Other tips  Work with your health care provider to achieve or maintain a healthy weight.  Do not use any products that contain nicotine or tobacco. These products include cigarettes, chewing tobacco, and vaping devices, such as e-cigarettes. If you need help quitting, ask your health care provider.  Know your numbers. Ask your health care provider to check your cholesterol and your blood sugar (glucose). Continue to have your blood tested as directed by your health care provider.  Do I need screening for cancer?  Depending on your health history and family history, you may need to have cancer screenings at different stages of your life. This may include screening for:  Breast cancer.  Cervical cancer.  Lung cancer.  Colorectal cancer.  What is my risk for osteoporosis?  After menopause, you may be at increased risk for osteoporosis. Osteoporosis is a condition  in which bone destruction happens more quickly than new bone creation. To help prevent osteoporosis or the bone fractures that can happen because of osteoporosis, you may take the following actions:  If you are 19-50 years old, get at least 1,000 mg of calcium and at least 600 international units (IU) of vitamin D per day.  If you are older than age 50 but younger than age 70, get at least 1,200 mg of calcium and at least 600 international units (IU) of vitamin D per day.  If you are older than age 70, get at least 1,200 mg of calcium and at least 800 international units (IU) of vitamin D per day.  Smoking and drinking excessive alcohol increase the risk of osteoporosis. Eat foods that are rich in calcium and vitamin D, and do weight-bearing exercises several times each week as directed by your health care provider.  How does menopause affect my mental health?  Depression may occur at any age, but it is more common as you become older. Common symptoms of depression include:  Feeling depressed.  Changes in sleep patterns.  Changes in appetite or eating patterns.  Feeling an overall lack of motivation or enjoyment of activities that you previously enjoyed.  Frequent crying spells.  Talk with your health care provider if you think that you are experiencing any of these symptoms.  General instructions  See your health care provider for regular wellness exams and vaccines. This may include:  Scheduling regular health, dental, and eye exams.  Getting and maintaining your vaccines. These include:  Influenza vaccine. Get this vaccine each year before the flu season begins.  Pneumonia vaccine.  Shingles vaccine.  Tetanus, diphtheria, and pertussis (Tdap) booster vaccine.  Your health care provider may also recommend other immunizations.  Tell your health care provider if you have ever been abused or do not feel safe at home.  Summary  Menopause is a normal process in which your ability to get pregnant comes to an end.  This  condition causes hot flashes, night sweats, decreased interest in sex, mood swings, headaches, or lack of sleep.  Treatment for this condition may include hormone replacement therapy.  Take actions to keep yourself healthy, including exercising regularly, eating a healthy diet, watching your weight, and checking your blood pressure and blood sugar levels.  Get screened for cancer and depression. Make sure that you are up to date with all your vaccines.  This information is not intended to replace advice given to you by your health care provider. Make sure you discuss any questions you have with your health care provider.  Document Revised: 05/09/2022 Document Reviewed: 05/09/2022  eGood Patient Education © 2024 [employer] Inc.      MyPlate from [employer]    MyPlate is an outline of a general healthy diet based on the Dietary Guidelines for Americans, 3507-1596, from the U.S. Department of Agriculture (USDA). It sets guidelines for how much food you should eat from each food group based on your age, sex, and level of physical activity.  What are tips for following MyPlate?  To follow MyPlate recommendations:  Eat a wide variety of fruits and vegetables, grains, and protein foods.  Serve smaller portions and eat less food throughout the day.  Limit portion sizes to avoid overeating.  Enjoy your food.  Get at least 150 minutes of exercise every week. This is about 30 minutes each day, 5 or more days per week.  It can be difficult to have every meal look like MyPlate. Think about MyPlate as eating guidelines for an entire day, rather than each individual meal.  Fruits and vegetables  Make one half of your plate fruits and vegetables.  Eat many different colors of fruits and vegetables each day.  For a 2,000-calorie daily food plan, eat:  2½ cups of vegetables every day.  2 cups of fruit every day.  1 cup is equal to:  1 cup raw or cooked vegetables.  1 cup raw fruit.  1 medium-sized orange, apple, or banana.  1 cup 100%  fruit or vegetable juice.  2 cups raw leafy greens, such as lettuce, spinach, or kale.  ½ cup dried fruit.  Grains  One fourth of your plate should be grains.  Make at least half of the grains you eat each day whole grains.  For a 2,000-calorie daily food plan, eat 6 oz of grains every day.  1 oz is equal to:  1 slice bread.  1 cup cereal.  ½ cup cooked rice, cereal, or pasta.  Protein  One fourth of your plate should be protein.  Eat a wide variety of protein foods, including meat, poultry, fish, eggs, beans, nuts, and tofu.  For a 2,000-calorie daily food plan, eat 5½ oz of protein every day.  1 oz is equal to:  1 oz meat, poultry, or fish.  ¼ cup cooked beans.  1 egg.  ½ oz nuts or seeds.  1 Tbsp peanut butter.  Dairy  Drink fat-free or low-fat (1%) milk.  Eat or drink dairy as a side to meals.  For a 2,000-calorie daily food plan, eat or drink 3 cups of dairy every day.  1 cup is equal to:  1 cup milk, yogurt, cottage cheese, or soy milk (soy beverage).  2 oz processed cheese.  1½ oz natural cheese.  Fats, oils, salt, and sugars  Only small amounts of oils are recommended.  Avoid foods that are high in calories and low in nutritional value (empty calories), like foods high in fat or added sugars.  Choose foods that are low in salt (sodium). Choose foods that have less than 140 milligrams (mg) of sodium per serving.  Drink water instead of sugary drinks. Drink enough fluid to keep your urine pale yellow.  Where to find support  Work with your health care provider or a dietitian to develop a customized eating plan that is right for you.  Download an rubi (mobile application) to help you track your daily food intake.  Where to find more information  USDA: ChooseMyPlate.gov  Summary  MyPlate is a general guideline for healthy eating from the USDA. It is based on the Dietary Guidelines for Americans, 6546-7088.  In general, fruits and vegetables should take up one half of your plate, grains should take up one fourth  of your plate, and protein should take up one fourth of your plate.  This information is not intended to replace advice given to you by your health care provider. Make sure you discuss any questions you have with your health care provider.  Document Revised: 11/08/2021 Document Reviewed: 11/08/2021  [employer] Patient Education © 2024 [employer]      Exercising to Stay Healthy  To become healthy and stay healthy, it is recommended that you do moderate-intensity and vigorous-intensity exercise. You can tell that you are exercising at a moderate intensity if your heart starts beating faster and you start breathing faster but can still hold a conversation. You can tell that you are exercising at a vigorous intensity if you are breathing much harder and faster and cannot hold a conversation while exercising.  How can exercise benefit me?  Exercising regularly is important. It has many health benefits, such as:  Improving overall fitness, flexibility, and endurance.  Increasing bone density.  Helping with weight control.  Decreasing body fat.  Increasing muscle strength and endurance.  Reducing stress and tension, anxiety, depression, or anger.  Improving overall health.  What guidelines should I follow while exercising?  Before you start a new exercise program, talk with your health care provider.  Do not exercise so much that you hurt yourself, feel dizzy, or get very short of breath.  Wear comfortable clothes and wear shoes with good support.  Drink plenty of water while you exercise to prevent dehydration or heat stroke.  Work out until your breathing and your heartbeat get faster (moderate intensity).  How often should I exercise?  Choose an activity that you enjoy, and set realistic goals. Your health care provider can help you make an activity plan that is individually designed and works best for you.  Exercise regularly as told by your health care provider. This may include:  Doing strength training two times a  week, such as:  Lifting weights.  Using resistance bands.  Push-ups.  Sit-ups.  Yoga.  Doing a certain intensity of exercise for a given amount of time. Choose from these options:  A total of 150 minutes of moderate-intensity exercise every week.  A total of 75 minutes of vigorous-intensity exercise every week.  A mix of moderate-intensity and vigorous-intensity exercise every week.  Children, pregnant women, people who have not exercised regularly, people who are overweight, and older adults may need to talk with a health care provider about what activities are safe to perform. If you have a medical condition, be sure to talk with your health care provider before you start a new exercise program.  What are some exercise ideas?  Moderate-intensity exercise ideas include:  Walking 1 mile (1.6 km) in about 15 minutes.  Biking.  Hiking.  Golfing.  Dancing.  Water aerobics.  Vigorous-intensity exercise ideas include:  Walking 4.5 miles (7.2 km) or more in about 1 hour.  Jogging or running 5 miles (8 km) in about 1 hour.  Biking 10 miles (16.1 km) or more in about 1 hour.  Lap swimming.  Roller-skating or in-line skating.  Cross-country skiing.  Vigorous competitive sports, such as football, basketball, and soccer.  Jumping rope.  Aerobic dancing.  What are some everyday activities that can help me get exercise?  Yard work, such as:  Pushing a .  Raking and bagging leaves.  Washing your car.  Pushing a stroller.  Shoveling snow.  Gardening.  Washing windows or floors.  How can I be more active in my day-to-day activities?  Use stairs instead of an elevator.  Take a walk during your lunch break.  If you drive, park your car farther away from your work or school.  If you take public transportation, get off one stop early and walk the rest of the way.  Stand up or walk around during all of your indoor phone calls.  Get up, stretch, and walk around every 30 minutes throughout the day.  Enjoy exercise with a friend.  Support to continue exercising will help you keep a regular routine of activity.  Where to find more information  You can find more information about exercising to stay healthy from:  U.S. Department of Health and Human Services: www.hhs.gov  Centers for Disease Control and Prevention (CDC): www.cdc.gov  Summary  Exercising regularly is important. It will improve your overall fitness, flexibility, and endurance.  Regular exercise will also improve your overall health. It can help you control your weight, reduce stress, and improve your bone density.  Do not exercise so much that you hurt yourself, feel dizzy, or get very short of breath.  Before you start a new exercise program, talk with your health care provider.  This information is not intended to replace advice given to you by your health care provider. Make sure you discuss any questions you have with your health care provider.  Document Revised: 04/15/2022 Document Reviewed: 04/15/2022  Elsevier Patient Education © 2024 Elsevier Inc.

## 2024-12-18 ENCOUNTER — PRIOR AUTHORIZATION (OUTPATIENT)
Dept: INTERNAL MEDICINE | Facility: CLINIC | Age: 64
End: 2024-12-18
Payer: COMMERCIAL

## 2024-12-18 NOTE — TELEPHONE ENCOUNTER
Information regarding your request  This request cannot be processed due to the medication is not covered by the plan.

## 2024-12-20 ENCOUNTER — TELEPHONE (OUTPATIENT)
Dept: INTERNAL MEDICINE | Facility: CLINIC | Age: 64
End: 2024-12-20
Payer: COMMERCIAL

## 2024-12-20 DIAGNOSIS — J06.9 UPPER RESPIRATORY TRACT INFECTION, UNSPECIFIED TYPE: Primary | ICD-10-CM

## 2024-12-20 RX ORDER — DOXYCYCLINE 100 MG/1
100 CAPSULE ORAL 2 TIMES DAILY
Qty: 20 CAPSULE | Refills: 0 | Status: SHIPPED | OUTPATIENT
Start: 2024-12-20 | End: 2024-12-30

## 2024-12-20 NOTE — TELEPHONE ENCOUNTER
Spoke with patient, informed of providers message. Patient verbalized understanding. Phone call complete.

## 2024-12-20 NOTE — TELEPHONE ENCOUNTER
Prescription for doxycycline sent to the pharmacy.  Please tell the patient to hold her multivitamins while on this medication.

## 2024-12-20 NOTE — TELEPHONE ENCOUNTER
Caller: Gaby Ratliff    Relationship: Self    Best call back number: [phone]     What is the best time to reach you: ANYTIME    Who are you requesting to speak with (clinical staff, provider,  specific staff member): DR YANG OR HER NURSE    What was the call regarding: PATIENT IS STILL SICK, BLOWING OUT GREEN CONGESTION WITH A CHEST RATTLE WHEN EXHALING. PATIENT IS SEEKING GUIDANCE, PLEASE ADVISE. THE PATIENT IS LEAVING THE [facility] ON 12/23/24.

## 2024-12-20 NOTE — TELEPHONE ENCOUNTER
Called and spoke with patient. She would like to know if there is anything else that can be prescribed because the cefdinir is not working

## 2025-01-24 DIAGNOSIS — I10 PRIMARY HYPERTENSION: ICD-10-CM

## 2025-01-24 RX ORDER — HYDROCHLOROTHIAZIDE 25 MG/1
25 TABLET ORAL DAILY
Qty: 30 TABLET | Refills: 1 | Status: SHIPPED | OUTPATIENT
Start: 2025-01-24

## 2025-02-03 DIAGNOSIS — K21.9 GASTROESOPHAGEAL REFLUX DISEASE, UNSPECIFIED WHETHER ESOPHAGITIS PRESENT: ICD-10-CM

## 2025-02-03 RX ORDER — FAMOTIDINE 20 MG/1
20 TABLET, FILM COATED ORAL 2 TIMES DAILY
Qty: 60 TABLET | Refills: 4 | Status: SHIPPED | OUTPATIENT
Start: 2025-02-03

## 2025-03-30 DIAGNOSIS — I10 PRIMARY HYPERTENSION: ICD-10-CM

## 2025-03-31 RX ORDER — HYDROCHLOROTHIAZIDE 25 MG/1
25 TABLET ORAL DAILY
Qty: 30 TABLET | Refills: 1 | Status: SHIPPED | OUTPATIENT
Start: 2025-03-31

## 2025-05-02 DIAGNOSIS — K21.9 GASTROESOPHAGEAL REFLUX DISEASE, UNSPECIFIED WHETHER ESOPHAGITIS PRESENT: ICD-10-CM

## 2025-05-02 RX ORDER — FAMOTIDINE 20 MG/1
20 TABLET, FILM COATED ORAL 2 TIMES DAILY
Qty: 180 TABLET | Refills: 1 | Status: SHIPPED | OUTPATIENT
Start: 2025-05-02

## 2025-06-09 DIAGNOSIS — J30.2 SEASONAL ALLERGIC RHINITIS, UNSPECIFIED TRIGGER: ICD-10-CM

## 2025-06-09 DIAGNOSIS — I10 PRIMARY HYPERTENSION: ICD-10-CM

## 2025-06-09 RX ORDER — MONTELUKAST SODIUM 10 MG/1
10 TABLET ORAL DAILY
Qty: 30 TABLET | Refills: 11 | Status: SHIPPED | OUTPATIENT
Start: 2025-06-09

## 2025-06-09 RX ORDER — HYDROCHLOROTHIAZIDE 25 MG/1
25 TABLET ORAL DAILY
Qty: 30 TABLET | Refills: 1 | Status: SHIPPED | OUTPATIENT
Start: 2025-06-09

## 2025-06-09 NOTE — TELEPHONE ENCOUNTER
Caller: Gaby Ratliff    Relationship: Self    Best call back number:   Telephone Information:   Mobile [phone]       Requested Prescriptions:   Requested Prescriptions     Pending Prescriptions Disp Refills    montelukast (SINGULAIR) 10 MG tablet 30 tablet 11     Sig: Take 1 tablet by mouth Daily.    hydroCHLOROthiazide 25 MG tablet 30 tablet 1     Sig: Take 1 tablet by mouth Daily.        Pharmacy where request should be sent: [facility] [id number] [address]  AT [facility] & [address] - [phone]  - [phone] FX     Last office visit with prescribing clinician: 12/16/2024   Last telemedicine visit with prescribing clinician: Visit date not found   Next office visit with prescribing clinician: 6/17/2025     Additional details provided by patient: PATIENT IS COMPLETELY OUT OF BOTH MEDICATIONS    Does the patient have less than a 3 day supply:  [x] Yes  [] No      [place]acio [place]   06/09/25 11:52 EDT

## 2025-06-10 DIAGNOSIS — J06.9 UPPER RESPIRATORY TRACT INFECTION, UNSPECIFIED TYPE: ICD-10-CM

## 2025-06-10 RX ORDER — CEFDINIR 300 MG/1
300 CAPSULE ORAL 2 TIMES DAILY
Qty: 20 CAPSULE | Refills: 0 | OUTPATIENT
Start: 2025-06-10 | End: 2025-06-20

## 2025-06-10 RX ORDER — DOXYCYCLINE 100 MG/1
100 CAPSULE ORAL 2 TIMES DAILY
Qty: 20 CAPSULE | Refills: 0 | OUTPATIENT
Start: 2025-06-10 | End: 2025-06-20

## 2025-06-17 ENCOUNTER — TELEPHONE (OUTPATIENT)
Dept: INTERNAL MEDICINE | Facility: CLINIC | Age: 65
End: 2025-06-17

## 2025-06-17 ENCOUNTER — RESULTS FOLLOW-UP (OUTPATIENT)
Dept: INTERNAL MEDICINE | Facility: CLINIC | Age: 65
End: 2025-06-17

## 2025-06-17 ENCOUNTER — OFFICE VISIT (OUTPATIENT)
Dept: INTERNAL MEDICINE | Facility: CLINIC | Age: 65
End: 2025-06-17
Payer: COMMERCIAL

## 2025-06-17 VITALS
WEIGHT: 201.8 LBS | TEMPERATURE: 97.1 F | DIASTOLIC BLOOD PRESSURE: 90 MMHG | HEART RATE: 72 BPM | SYSTOLIC BLOOD PRESSURE: 132 MMHG | RESPIRATION RATE: 16 BRPM | BODY MASS INDEX: 34.88 KG/M2

## 2025-06-17 DIAGNOSIS — K21.9 CHRONIC GERD: ICD-10-CM

## 2025-06-17 DIAGNOSIS — E78.5 DYSLIPIDEMIA: ICD-10-CM

## 2025-06-17 DIAGNOSIS — F51.01 PRIMARY INSOMNIA: ICD-10-CM

## 2025-06-17 DIAGNOSIS — R73.03 PREDIABETES: ICD-10-CM

## 2025-06-17 DIAGNOSIS — I10 PRIMARY HYPERTENSION: Primary | ICD-10-CM

## 2025-06-17 DIAGNOSIS — E78.5 DYSLIPIDEMIA: Primary | ICD-10-CM

## 2025-06-17 DIAGNOSIS — E66.9 OBESITY (BMI 30-39.9): ICD-10-CM

## 2025-06-17 DIAGNOSIS — R91.1 LUNG NODULE, SOLITARY: ICD-10-CM

## 2025-06-17 DIAGNOSIS — Z85.3 HISTORY OF LEFT BREAST CANCER: ICD-10-CM

## 2025-06-17 DIAGNOSIS — K63.5 BENIGN COLONIC POLYP: ICD-10-CM

## 2025-06-17 DIAGNOSIS — Z23 NEED FOR PNEUMOCOCCAL VACCINE: ICD-10-CM

## 2025-06-17 LAB
ALBUMIN SERPL-MCNC: 4.8 G/DL (ref 3.5–5.2)
ALBUMIN/GLOB SERPL: 1.5 G/DL
ALP SERPL-CCNC: 73 U/L (ref 39–117)
ALT SERPL W P-5'-P-CCNC: 31 U/L (ref 1–33)
ANION GAP SERPL CALCULATED.3IONS-SCNC: 16 MMOL/L (ref 5–15)
AST SERPL-CCNC: 31 U/L (ref 1–32)
BASOPHILS # BLD AUTO: 0.07 10*3/MM3 (ref 0–0.2)
BASOPHILS NFR BLD AUTO: 1.1 % (ref 0–1.5)
BILIRUB SERPL-MCNC: 0.4 MG/DL (ref 0–1.2)
BUN SERPL-MCNC: 15 MG/DL (ref 8–23)
BUN/CREAT SERPL: 16.5 (ref 7–25)
CALCIUM SPEC-SCNC: 10 MG/DL (ref 8.6–10.5)
CHLORIDE SERPL-SCNC: 99 MMOL/L (ref 98–107)
CHOLEST SERPL-MCNC: 283 MG/DL (ref 0–200)
CO2 SERPL-SCNC: 24 MMOL/L (ref 22–29)
CREAT SERPL-MCNC: 0.91 MG/DL (ref 0.57–1)
DEPRECATED RDW RBC AUTO: 44 FL (ref 37–54)
EGFRCR SERPLBLD CKD-EPI 2021: 71 ML/MIN/1.73
EOSINOPHIL # BLD AUTO: 0.23 10*3/MM3 (ref 0–0.4)
EOSINOPHIL NFR BLD AUTO: 3.7 % (ref 0.3–6.2)
ERYTHROCYTE [DISTWIDTH] IN BLOOD BY AUTOMATED COUNT: 13.1 % (ref 12.3–15.4)
EXPIRATION DATE: ABNORMAL
GLOBULIN UR ELPH-MCNC: 3.1 GM/DL
GLUCOSE SERPL-MCNC: 126 MG/DL (ref 65–99)
HBA1C MFR BLD: 5.9 % (ref 4.5–5.7)
HCT VFR BLD AUTO: 41.8 % (ref 34–46.6)
HDLC SERPL-MCNC: 56 MG/DL (ref 40–60)
HGB BLD-MCNC: 14 G/DL (ref 12–15.9)
IMM GRANULOCYTES # BLD AUTO: 0.01 10*3/MM3 (ref 0–0.05)
IMM GRANULOCYTES NFR BLD AUTO: 0.2 % (ref 0–0.5)
LDLC SERPL CALC-MCNC: 178 MG/DL (ref 0–100)
LDLC/HDLC SERPL: 3.13 {RATIO}
LYMPHOCYTES # BLD AUTO: 1.62 10*3/MM3 (ref 0.7–3.1)
LYMPHOCYTES NFR BLD AUTO: 25.9 % (ref 19.6–45.3)
Lab: ABNORMAL
MCH RBC QN AUTO: 31.6 PG (ref 26.6–33)
MCHC RBC AUTO-ENTMCNC: 33.5 G/DL (ref 31.5–35.7)
MCV RBC AUTO: 94.4 FL (ref 79–97)
MONOCYTES # BLD AUTO: 0.51 10*3/MM3 (ref 0.1–0.9)
MONOCYTES NFR BLD AUTO: 8.1 % (ref 5–12)
NEUTROPHILS NFR BLD AUTO: 3.82 10*3/MM3 (ref 1.7–7)
NEUTROPHILS NFR BLD AUTO: 61 % (ref 42.7–76)
NRBC BLD AUTO-RTO: 0 /100 WBC (ref 0–0.2)
PLATELET # BLD AUTO: 335 10*3/MM3 (ref 140–450)
PMV BLD AUTO: 11 FL (ref 6–12)
POTASSIUM SERPL-SCNC: 4.3 MMOL/L (ref 3.5–5.2)
PROT SERPL-MCNC: 7.9 G/DL (ref 6–8.5)
RBC # BLD AUTO: 4.43 10*6/MM3 (ref 3.77–5.28)
SODIUM SERPL-SCNC: 139 MMOL/L (ref 136–145)
TRIGL SERPL-MCNC: 259 MG/DL (ref 0–150)
VLDLC SERPL-MCNC: 49 MG/DL (ref 5–40)
WBC NRBC COR # BLD AUTO: 6.26 10*3/MM3 (ref 3.4–10.8)

## 2025-06-17 PROCEDURE — 83036 HEMOGLOBIN GLYCOSYLATED A1C: CPT | Performed by: INTERNAL MEDICINE

## 2025-06-17 PROCEDURE — 90684 PCV21 VACCINE IM: CPT | Performed by: INTERNAL MEDICINE

## 2025-06-17 PROCEDURE — 90471 IMMUNIZATION ADMIN: CPT | Performed by: INTERNAL MEDICINE

## 2025-06-17 PROCEDURE — 99214 OFFICE O/P EST MOD 30 MIN: CPT | Performed by: INTERNAL MEDICINE

## 2025-06-17 PROCEDURE — 85025 COMPLETE CBC W/AUTO DIFF WBC: CPT | Performed by: INTERNAL MEDICINE

## 2025-06-17 PROCEDURE — 80053 COMPREHEN METABOLIC PANEL: CPT | Performed by: INTERNAL MEDICINE

## 2025-06-17 PROCEDURE — 80061 LIPID PANEL: CPT | Performed by: INTERNAL MEDICINE

## 2025-06-17 RX ORDER — SEMAGLUTIDE 0.25 MG/.5ML
0.25 INJECTION, SOLUTION SUBCUTANEOUS WEEKLY
Qty: 2 ML | Refills: 0 | Status: SHIPPED | OUTPATIENT
Start: 2025-06-17 | End: 2025-06-19 | Stop reason: SDUPTHER

## 2025-06-17 RX ORDER — TRAZODONE HYDROCHLORIDE 50 MG/1
TABLET ORAL
Qty: 60 TABLET | Refills: 5 | Status: SHIPPED | OUTPATIENT
Start: 2025-06-17

## 2025-06-17 NOTE — PATIENT INSTRUCTIONS
I recommend Shingrix (Shingles vaccine) at the pharmacy, as we discussed.    I recommend the Influenza vaccine and the COVID-19 bivalent vaccine, in office or at the pharmacy, Fall 2025, as we discussed.

## 2025-06-17 NOTE — LETTER
[facility]  Vaccine Consent Form    Patient Name:  Gaby Ratliff  Patient :  1ANTHEM BLUE CROSS - ANTHEM BLUE CROSS BLUE [facility] PPO  Subscriber:  Davi Ratliff  Subscriber ID:[id number]  Relationship:Spouse        Screening Checklist  The following questions should be completed prior to vaccination. If you answer “yes” to any question, it does not necessarily mean you should not be vaccinated. It just means we may need to clarify or ask more questions. If a question is unclear, please ask your healthcare provider to explain it.    Yes No   Any fever or moderate to severe illness today (mild illness and/or antibiotic treatment are not contraindications)?     Do you have a history of a serious reaction to any previous vaccinations, such as anaphylaxis, encephalopathy within 7 days, Guillain-Ponce syndrome within 6 weeks, seizure?     Have you received any live vaccine(s) (e.g MMR, AVI) or any other vaccines in the last month (to ensure duplicate doses aren't given)?     Do you have an anaphylactic allergy to latex (DTaP, DTaP-IPV, Hep A, Hep B, MenB, RV, Td, Tdap), baker’s yeast (Hep B, HPV), polysorbates (RSV, nirsevimab, PCV 20 and 21, Rotavirrus, Tdap, Shingrix), or gelatin (AVI, MMR)?     Do you have an anaphylactic allergy to neomycin (Rabies, AVI, MMR, IPV, Hep A), polymyxin B (IPV), or streptomycin (IPV)?      Any cancer, leukemia, AIDS, or other immune system disorder? (AVI, MMR, RV)     Do you have a parent, brother, or sister with an immune system problem (if immune competence of vaccine recipient clinically verified, can proceed)? (MMR, AVI)     Any recent steroid treatments for >2 weeks, chemotherapy, or radiation treatment? (AVI, MMR)     Have you received antibody-containing blood transfusions or IVIG in the past 11 months (recommended interval is dependent on product)? (MMR, AVI)     Have you taken antiviral drugs (acyclovir, famciclovir, valacyclovir for AVI) in the last 24 or 48  "hours, respectively?      Are you pregnant or planning to become pregnant within 1 month? (AVI, MMR, HPV, IPV, MenB, Abrexvy; For Hep B- refer to Engerix-B; For RSV - Abrysvo is indicated for 32-36 weeks of pregnancy from September to January)     For infants, have you ever been told your child has had intussusception or a medical emergency involving obstruction of the intestine (Rotavirus)? If not for an infant, can skip this question.         *Ordering Physicians/APC should be consulted if \"yes\" is checked by the patient or guardian above.  I have received, read, and understand the Vaccine Information Statement (VIS) for each vaccine ordered.  I have considered my or my child's health status as well as the health status of my close contacts.  I have taken the opportunity to discuss my vaccine questions with my or my child's health care provider.   I have requested that the ordered vaccine(s) be given to me or my child.  I understand the benefits and risks of the vaccines.  I understand that I should remain in the clinic for 15 minutes after receiving the vaccine(s).  _________________________________________________________  Signature of Patient or Parent/Legal Guardian ____________________  Date   "

## 2025-06-17 NOTE — LETTER
Gaby Ratliff  [address] [facility] [address]    June 23, 2025     Dear Ms. Evy:    Below are the results from your recent visit:    Resulted Orders   POC Glycosylated Hemoglobin (Hb A1C)   Result Value Ref Range    Hemoglobin A1C 5.9 (A) 4.5 - 5.7 %    Lot Number [id number]     Expiration Date 2/17/27    Lipid Panel   Result Value Ref Range    Total Cholesterol 283 (H) 0 - 200 mg/dL    Triglycerides 259 (H) 0 - 150 mg/dL    HDL Cholesterol 56 40 - 60 mg/dL    LDL Cholesterol  178 (H) 0 - 100 mg/dL    VLDL Cholesterol 49 (H) 5 - 40 mg/dL    LDL/HDL Ratio 3.13    Comprehensive Metabolic Panel   Result Value Ref Range    Glucose 126 (H) 65 - 99 mg/dL    BUN 15.0 8.0 - 23.0 mg/dL    Creatinine 0.91 0.57 - 1.00 mg/dL    Sodium 139 136 - 145 mmol/L    Potassium 4.3 3.5 - 5.2 mmol/L      Comment:      Slight hemolysis detected by analyzer. Result may be falsely elevated.    Chloride 99 98 - 107 mmol/L    CO2 24.0 22.0 - 29.0 mmol/L    Calcium 10.0 8.6 - 10.5 mg/dL    Total Protein 7.9 6.0 - 8.5 g/dL    Albumin 4.8 3.5 - 5.2 g/dL    ALT (SGPT) 31 1 - 33 U/L    AST (SGOT) 31 1 - 32 U/L      Comment:      Slight hemolysis detected by analyzer. Result may be falsely elevated.    Alkaline Phosphatase 73 39 - 117 U/L    Total Bilirubin 0.4 0.0 - 1.2 mg/dL    Globulin 3.1 gm/dL    A/G Ratio 1.5 g/dL    BUN/Creatinine Ratio 16.5 7.0 - 25.0    Anion Gap 16.0 (H) 5.0 - 15.0 mmol/L    eGFR 71.0 >60.0 mL/min/1.73   CBC Auto Differential   Result Value Ref Range    WBC 6.26 3.40 - 10.80 10*3/mm3    RBC 4.43 3.77 - 5.28 10*6/mm3    Hemoglobin 14.0 12.0 - 15.9 g/dL    Hematocrit 41.8 34.0 - 46.6 %    MCV 94.4 79.0 - 97.0 fL    MCH 31.6 26.6 - 33.0 pg    MCHC 33.5 31.5 - 35.7 g/dL    RDW 13.1 12.3 - 15.4 %    RDW-SD 44.0 37.0 - 54.0 fl    MPV 11.0 6.0 - 12.0 fL    Platelets 335 140 - 450 10*3/mm3    Neutrophil % 61.0 42.7 - 76.0 %    Lymphocyte % 25.9 19.6 - 45.3 %    Monocyte % 8.1 5.0 - 12.0 %    Eosinophil % 3.7 0.3 -  6.2 %    Basophil % 1.1 0.0 - 1.5 %    Immature Grans % 0.2 0.0 - 0.5 %    Neutrophils, Absolute 3.82 1.70 - 7.00 10*3/mm3    Lymphocytes, Absolute 1.62 0.70 - 3.10 10*3/mm3    Monocytes, Absolute 0.51 0.10 - 0.90 10*3/mm3    Eosinophils, Absolute 0.23 0.00 - 0.40 10*3/mm3    Basophils, Absolute 0.07 0.00 - 0.20 10*3/mm3    Immature Grans, Absolute 0.01 0.00 - 0.05 10*3/mm3    nRBC 0.0 0.0 - 0.2 /100 WBC       Cholesterol levels are not at goal.  A prescription for Atorvastatin has been sent to the pharmacy.  We will recheck your cholesterol levels fasting at your 6 to 8-week follow-up appointment.  In addition, please follow a heart healthy diet and exercise program, to bring these levels down.      Otherwise, labs look good.  Please continue your current medication and plan.     If you have any questions or concerns, please don't hesitate to call.         Sincerely,        Eun Mcmanus MD

## 2025-06-17 NOTE — PROGRESS NOTES
Subjective       Gaby Ratliff is a 64 y.o. female.     Chief Complaint   Patient presents with    Hypertension     6-month follow-up    Hyperlipidemia    Prediabetes    Obesity       History obtained from the patient.      History of Present Illness     The patient reports a 6 to 8-week history of a white patch on her right tonsil.  This does not cause pain.  Overall, she feels it is improving.    The patient reports a recent onset of insomnia.  She reports her  is a very restless sleeper, which does not help.  She denies depression or anxiety.  She started taking some over-the-counter supplements, but is not sure if they are working.  She has never been on medication for insomnia.    The patient was diagnosed with a right upper lobe Pulmonary Nodule in April 2014, per CT Chest done at [facility].  CT Chest done at [facility] on 7/9/2014 was read as a stable right middle lobe lung nodule.  CT Chest done on 4/19/2017, at [facility] showed the right middle lobe nodule to be slightly smaller than the previous CT.  On 7/31/2019, CT Chest at [facility] showed a right upper lobe 1 cm nodule.  Right middle lobe nodule had decreased in size.  She saw Dr. Clarke Mason.  On 7/20/2020,  CT showed a RUL ground glass nodule, stable x 1 year.  She saw Dr. Mason again on 7/26/2021.  He felt her abnormal CT chest findings were most consistent with chronic Histoplasmosis.  He ordered multiple blood test (fungal antibodies, histoplasmosis antigen, and quantiferon TB), which were negative.  Biopsy has not been done.  PET scan on 8/10/2021 showed the right upper lobe pulmonary nodular focus or cluster of micronodules to be non-hypermetabolic.  CT Chest done 5/19/2022 showed the right middle lobe nodule to be unchanged since 11/18/2021, but not previously evaluated by the PET scan done on 8/10/2021.  The other solid pulmonary nodules were unchanged since 7/31/2019.  The groundglass nodule in the right upper  lobe was stable for at least 2 years.  Findings were most consistent with postinfectious/inflammatory nodules and granulomatous disease exposure.  She saw Dr. Mason on 5/26/2022 and he felt things were stable and recommended following the nodules for a total of 5 years.  Last CT chest on 5/24/2023 showed the right middle lobe lung nodule to be stable since 9/2021.  Other nodular densities within the right lung were stable since at least 7/2020.  There were no new pulmonary nodules.  Dr. Clarke Mason has retired.  CT chest on 6/5/2024 showed: 1.New area of somewhat vague peripheral irregular increased density in the posterior right lower lobe, likely infectious/inflammatory. Follow-up 3 months recommended. 2. Stable appearance of the chest otherwise. She establish care with Dr. Dominik Mason on 6/9/2023, and was last seen for follow-up on 8/14/2024.  CT chest on 12/12/2024 showed: 1. Partial resolution of previously demonstrated tree-in-bud opacities compatible with a resolving infectious process. There are a few new tiny nodular and tree-in-bud opacities located more superiorly, presumably the same process.   2. Otherwise stable multiple pulmonary nodules.  She ha a follow-up appointment scheduled with ALLEN Jin on 12/19/2024, but had to cancel.   She denies cough, hemoptysis, chest pain/tightness, dyspnea, wheezing, and MELISSA.  She states her allergy symptoms have significantly improved.      He patient is followed by Dr. Ortiz for a history of Left Breast Cancer, last appointment 7/9/2024..  Mammogram on 6/25/2024 was category 2.   She denies any breast symptoms.      The patient has Osteoarthritis.  She has stable chronic low back pain and hip pain.  She denies swelling and stiffness of the joints.  She has seen Orthopedics, but no recent appointment.  She is off NSAIDs.  She takes Glucosamine/Chondroitin and Turmeric.  She has also takes a Magnesium supplement daily.           Cardiac Follow-up:  "The patient is here today for a follow-up visit.      Her Hypertension has been stable.   Medication: Amlodipine, HCTZ, and Aspirin.  Her Hyperlipidemia has been unstable.  LDL goal < 100.  Last , .   Medication: Omega-3 fatty acids (previously declined statin treatment).    Her Prediabetes has been stable.  Medication: None.  Side Effects: None.     Comorbid Illness: Obesity (BMI 30.0-34.9).  She tried Zepbound, but it caused a rash on her abdomen and for her to be \"wonky\".  It was also very expensive.  She would like to try Wegovy.     Procedures: On 7/16/2019, Cardiac CT Scan showed Calcium Score 0.     Interval Events: She has not been checking her blood pressure at home.  On 12/13/2024, HgA1C was 5.9.  Creatinine was mildly elevated (1.06).     Symptoms: Has occasional fluttering of the heart.  Denies chest pain, dyspnea, MELISSA, orthopnea, PND, syncope, lower extremity edema, claudication, lightheadedness, and dizziness.   Associated Symptoms: Weight up 4 pounds in the past 6 months.  No fatigue, headaches, polyuria, polydipsia, arthralgias, myalgias, visual impairment, memory loss, or concentration issues.      Lifestyle: She consumes a diverse and healthy diet. She walks 2-3 times per week, and is active overall.   Tobacco Use: Former smoker.      GERD Follow-up: The patient is being seen for a routine clinic follow-up of Gastroesophageal Reflux Disease, which is stable.   Comorbid Illness: Irritable Bowel Syndrome.  Takes Prozac.  Interval Events: None.  Symptoms: Reports occasional acid reflux.  No abdominal pain, heartburn, nausea, vomiting, hematemesis, dysphagia, odynophagia, hematochezia, melena, early satiety, belching, or bloating.   Associated Symptoms:  No chronic cough, sore throat, hoarseness, or wheezing.   Medications: Pepcid daily.    Side Effects: None.  Had a \"sick hunger feeling\" with Omeprazole.      Colonic Polyp Follow-up: The patient is being seen for a routine clinic " follow-up of Colon Polyps, which is stable.   Pertinent Medical History: Anal Fissure.  Interval Events:   Colonoscopy 12/13/2024, ascending colon tubular adenoma.   Symptoms: Reports occasional constipation.  No abdominal pain, diarrhea, hematochezia, melena, or change in stool.  Medication: Docusate,  Probiotic, and Mg daily.      Current Outpatient Medications on File Prior to Visit   Medication Sig Dispense Refill    acetaminophen (TYLENOL) 500 MG tablet Take 2 tablets by mouth 2 (Two) Times a Day As Needed for Mild Pain.      amLODIPine (NORVASC) 10 MG tablet TAKE 1 TABLET BY MOUTH EVERY DAY 90 tablet 3    aspirin 81 MG EC tablet Take 1 tablet by mouth Every Other Day. (Patient taking differently: Take 1 tablet by mouth Daily.)      calcium citrate-vitamin d (CALCITRATE) 315-250 MG-UNIT tablet tablet Take 2 tablets by mouth Daily.      Docusate Calcium (STOOL SOFTENER PO) 2 capsules Daily.      famotidine (PEPCID) 20 MG tablet TAKE 1 TABLET BY MOUTH TWICE A  tablet 1    FLUoxetine (PROzac) 20 MG capsule TAKE 1 CAPSULE BY MOUTH EVERY DAY 30 capsule 11    Glucos-Chondroit-Hyaluron-MSM (GLUCOSAMINE CHONDROITIN JOINT PO) 2 tablets Daily.      hydroCHLOROthiazide 25 MG tablet Take 1 tablet by mouth Daily. 30 tablet 1    MAGNESIUM GLYCINATE PO Take 500 mg by mouth Daily.      montelukast (SINGULAIR) 10 MG tablet Take 1 tablet by mouth Daily. 30 tablet 11    Multiple Vitamins-Minerals (MULTI VITAMIN/MINERALS PO) Take  by mouth daily.      OMEGA 3-6-9 FATTY ACIDS PO Take  by mouth.      Probiotic Product (ALIGN) 4 MG capsule 1 capsule Daily.      Turmeric 500 MG capsule Take 1 capsule by mouth Daily.       No current facility-administered medications on file prior to visit.       Current outpatient and discharge medications have been reconciled for the patient.  Reviewed by: Eun Mcmanus MD        The following portions of the patient's history were reviewed and updated as appropriate: allergies, current  medications, past family history, past medical history, past social history, past surgical history, and problem list.    Review of Systems   Constitutional:  Positive for unexpected weight change. Negative for fatigue.   Eyes:  Negative for visual disturbance.   Respiratory:  Negative for cough, shortness of breath and wheezing.    Cardiovascular:  Positive for palpitations. Negative for chest pain and leg swelling.        No MELISSA, orthopnea, or claudication.   Gastrointestinal:  Positive for constipation. Negative for abdominal pain, blood in stool, diarrhea, nausea and vomiting.        Denies melena.   Endocrine: Negative for polydipsia and polyuria.   Musculoskeletal:  Negative for arthralgias and myalgias.   Neurological:  Negative for dizziness, syncope, light-headedness and headaches.        No memory issues.   Psychiatric/Behavioral:  Positive for sleep disturbance. Negative for decreased concentration. The patient is not nervous/anxious.          Objective       Blood pressure 132/90, pulse 72, temperature 97.1 °F (36.2 °C), temperature source Infrared, resp. rate 16, weight 91.5 kg (201 lb 12.8 oz), not currently breastfeeding.  Body mass index is 34.88 kg/m².      Physical Exam  Vitals and nursing note reviewed.   Constitutional:       Appearance: She is well-developed.      Comments: BMI greater then 30   HENT:      Mouth/Throat:      Comments: Tonsils are 2+ bilaterally without erythema.  There is a white superficial lesion on the right anterior tonsil  Neck:      Thyroid: No thyroid mass or thyromegaly.      Vascular: No carotid bruit.   Cardiovascular:      Rate and Rhythm: Normal rate and regular rhythm.      Pulses: Normal pulses.      Heart sounds: Normal heart sounds. No murmur heard.  Pulmonary:      Effort: Pulmonary effort is normal.      Breath sounds: Normal breath sounds.   Musculoskeletal:      Right lower leg: No edema.      Left lower leg: No edema.   Neurological:      Mental Status: She  "is alert.   Psychiatric:         Mood and Affect: Mood normal.       Results for orders placed or performed in visit on 06/17/25   POC Glycosylated Hemoglobin (Hb A1C)    Collection Time: 06/17/25 10:48 AM    Specimen: Blood   Result Value Ref Range    Hemoglobin A1C 5.9 (A) 4.5 - 5.7 %    Lot Number [id number]     Expiration Date 2/17/27        Assessment / Plan:  Diagnoses and all orders for this visit:    1. Primary hypertension (Primary)  -     Lipid Panel  -     Comprehensive Metabolic Panel  -     CBC & Differential   Continue current medication(s) as noted in the history of present illness.    2. Dyslipidemia  -     Lipid Panel  -     Comprehensive Metabolic Panel  -     CBC & Differential   Continue current medication(s) as noted in the history of present illness.    3. Prediabetes  -     POC Glycosylated Hemoglobin (Hb A1C)  -     Lipid Panel  -     Comprehensive Metabolic Panel  -     CBC & Differential   Continue current medication(s) as noted in the history of present illness.    4. Obesity (BMI 30-39.9)  -     Semaglutide-Weight Management (Wegovy) 0.25 MG/0.5ML solution auto-injector; Inject 0.5 mL under the skin into the appropriate area as directed 1 (One) Time Per Week.  Dispense: 2 mL; Refill: 0- NEW    5. Chronic GERD   Continue current medication(s) as noted in the history of present illness.    6. Benign colonic polyp   Colonoscopy up-to-date.    7. Lung nodule, solitary - Ground Glass   Follow-up for Pulmonary    8. History of left breast cancer   Follow-up per Hematology/Oncology.    9. Primary insomnia  -     traZODone (DESYREL) 50 MG tablet; 1-2 po qhs prn sleep  Dispense: 60 tablet; Refill: 5- NEW   Also discussed Hydroxyzine, but the patient states antihistamines \"wired me\".   May continue Melatonin.    10. Need for pneumococcal vaccine  -     Pneumococcal Conjugate Vaccine 21 (18+ yrs)           I recommended Shingrix (Shingles vaccine) at the pharmacy.    I recommended the Influenza " vaccine and the COVID-19 bivalent vaccine, in office or at the pharmacy, Fall 2025.        Return in about 1 month (around 7/17/2025) for Recheck Weight, non-fasting.

## 2025-06-18 NOTE — TELEPHONE ENCOUNTER
Attempt to call patient, no answer    Left vm for patient to return call      PLEASE RELAY:   Call patient please.    Her LDL cholesterol level has gone way up.  I would recommend starting a statin cholesterol medication.  If she is agreeable, please send the message back to me and not the covering provider and I will call in a prescription.    I will also send a lab letter.

## 2025-06-18 NOTE — TELEPHONE ENCOUNTER
Name: EvyGaby      Relationship: Self      Best Callback Number: [phone]       HUB PROVIDED THE RELAY MESSAGE FROM THE OFFICE      PATIENT: VOICED UNDERSTANDING AND HAS NO FURTHER QUESTIONS AT THIS TIME    ADDITIONAL INFORMATION: PATIENT AGREES TO START MEDICATION

## 2025-06-18 NOTE — TELEPHONE ENCOUNTER
Call patient please.    Her LDL cholesterol level has gone way up.  I would recommend starting a statin cholesterol medication.  If she is agreeable, please send the message back to me and not the covering provider and I will call in a prescription.    I will also send a lab letter.

## 2025-06-19 RX ORDER — SEMAGLUTIDE 0.25 MG/.5ML
0.25 INJECTION, SOLUTION SUBCUTANEOUS WEEKLY
Qty: 2 ML | Refills: 0 | OUTPATIENT
Start: 2025-06-19

## 2025-06-19 NOTE — TELEPHONE ENCOUNTER
I did not see a statin on her med list.    It does show the [facility] pharmacy was sent to  [facility] instead of [facility]   Spoke to Gaby  she [facility] Rx [facility]     Called [facility] [phone] and cancelled Rx on voicemail  Called [facility] pharmacy spoke with Ford MO and he took a verbal order for Wegovy.    Pharmacies were updated

## 2025-06-19 NOTE — TELEPHONE ENCOUNTER
Patient called. She got the Trazodone at [facility]. [facility] did not have Wegovy or a statin cholesterol medication called in for her to .   Call: [phone]

## 2025-06-20 ENCOUNTER — PRIOR AUTHORIZATION (OUTPATIENT)
Dept: INTERNAL MEDICINE | Facility: CLINIC | Age: 65
End: 2025-06-20
Payer: COMMERCIAL

## 2025-06-23 RX ORDER — ATORVASTATIN CALCIUM 10 MG/1
10 TABLET, FILM COATED ORAL NIGHTLY
Qty: 30 TABLET | Refills: 5 | Status: SHIPPED | OUTPATIENT
Start: 2025-06-23

## 2025-06-23 NOTE — TELEPHONE ENCOUNTER
Name: EvyGaby      Relationship: Self      Best Callback Number: [phone]       HUB PROVIDED THE RELAY MESSAGE FROM THE OFFICE      PATIENT: VOICED UNDERSTANDING AND HAS NO FURTHER QUESTIONS AT THIS TIME    ADDITIONAL INFORMATION:

## 2025-06-23 NOTE — TELEPHONE ENCOUNTER
Attempt to call patient, no answer    Left vm for patient to return call      PLEASE RELAY:   Please inform the patient I was on vacation last week and that is why the cholesterol medication had not been called in yet.  I have now sent a prescription for atorvastatin to the pharmacy.  Please schedule a 6 to 8-week follow-up appointment with me fasting.  I will also send a lab letter

## 2025-06-23 NOTE — TELEPHONE ENCOUNTER
Please inform the patient I was on vacation last week and that is why the cholesterol medication had not been called in yet.  I have now sent a prescription for atorvastatin to the pharmacy.  Please schedule a 6 to 8-week follow-up appointment with me fasting.  I will also send a lab letter

## 2025-06-25 NOTE — TELEPHONE ENCOUNTER
Your request for prior authorization was denied, but an appeal is available for your patient.    Would you like to initiate the appeal ?

## 2025-06-26 NOTE — TELEPHONE ENCOUNTER
Received fax from [employer], WEGOVY is not covered by insurance for weight loss alone. Patient stated she knows and is going through [facility] for medication.      She hasn't started atorvastatin medication yet. Just a fyi

## 2025-07-14 DIAGNOSIS — E66.9 OBESITY (BMI 30-39.9): ICD-10-CM

## 2025-07-14 RX ORDER — SEMAGLUTIDE 0.5 MG/.5ML
0.5 INJECTION, SOLUTION SUBCUTANEOUS WEEKLY
Qty: 2 ML | Refills: 0 | Status: SHIPPED | OUTPATIENT
Start: 2025-07-14

## 2025-07-14 NOTE — TELEPHONE ENCOUNTER
Lov: 06/17/2025  Nov: 07/28/2025    Attempt to call patient, no answer    Left vm for patient to return call      PLEASE RELAY:     Would pt like to increase?

## 2025-07-14 NOTE — TELEPHONE ENCOUNTER
Name: Gaby Ratliff      Relationship: Self      Best Callback Number: [phone]       HUB PROVIDED THE RELAY MESSAGE FROM THE OFFICE      PATIENT: VOICED UNDERSTANDING AND HAS NO FURTHER QUESTIONS AT THIS TIME    ADDITIONAL INFORMATION: PATIENT WOULD LIKE TO INCREASE.

## 2025-07-28 ENCOUNTER — OFFICE VISIT (OUTPATIENT)
Dept: INTERNAL MEDICINE | Facility: CLINIC | Age: 65
End: 2025-07-28
Payer: COMMERCIAL

## 2025-07-28 VITALS
BODY MASS INDEX: 34.18 KG/M2 | SYSTOLIC BLOOD PRESSURE: 126 MMHG | HEIGHT: 64 IN | TEMPERATURE: 98.1 F | OXYGEN SATURATION: 98 % | RESPIRATION RATE: 20 BRPM | DIASTOLIC BLOOD PRESSURE: 76 MMHG | WEIGHT: 200.2 LBS | HEART RATE: 66 BPM

## 2025-07-28 DIAGNOSIS — I10 PRIMARY HYPERTENSION: ICD-10-CM

## 2025-07-28 DIAGNOSIS — Z79.899 HIGH RISK MEDICATION USE: ICD-10-CM

## 2025-07-28 DIAGNOSIS — E78.5 DYSLIPIDEMIA: Primary | ICD-10-CM

## 2025-07-28 DIAGNOSIS — J35.9 LESION OF TONSIL: ICD-10-CM

## 2025-07-28 DIAGNOSIS — E66.9 OBESITY (BMI 30-39.9): ICD-10-CM

## 2025-07-28 PROCEDURE — 80053 COMPREHEN METABOLIC PANEL: CPT | Performed by: INTERNAL MEDICINE

## 2025-07-28 PROCEDURE — 80061 LIPID PANEL: CPT | Performed by: INTERNAL MEDICINE

## 2025-07-28 PROCEDURE — 99214 OFFICE O/P EST MOD 30 MIN: CPT | Performed by: INTERNAL MEDICINE

## 2025-07-28 RX ORDER — AMLODIPINE BESYLATE 10 MG/1
10 TABLET ORAL DAILY
Qty: 90 TABLET | Refills: 3 | Status: SHIPPED | OUTPATIENT
Start: 2025-07-28

## 2025-07-28 NOTE — PROGRESS NOTES
"Subjective       Gaby Ratliff is a 64 y.o. female.     Chief Complaint   Patient presents with    Hyperlipidemia     6-8 week follow-up after starting Atorvastatin    Obesity     Follow-up on Wegovy/weight check       History obtained from the patient.      History of Present Illness     On 6/17/2025, the patient was seen for follow-up.  At that time she reported a 6 to 8-week history of a right tonsil lesion, that had improved overall.  Since then, she reports no change in the lesion     Cardiac Follow-up: The patient is here today for a follow-up visit.      Her Hypertension has been stable.   Medication: Amlodipine, HCTZ, and Aspirin.  Her Hyperlipidemia has been unstable.  LDL goal < 100.  Last , .   Medication: Atorvastatin and Omega-3 Fatty Acids.    Side Effects: Decreased appetite, nausea, and worsening GERD symptoms with Wegovy.     Comorbid Illness: Obesity (BMI 30.0-34.9).  She tried Zepbound, but it caused a rash on her abdomen and for her to be \"wonky\".  It was also very expensive.  Wegovy was started on 6/17/2025.  She reports she is doing her second dose of the 0.5 mg today.     Procedures: On 7/16/2019, Cardiac CT Scan showed Calcium Score 0.     Interval Events: She has not been checking her blood pressure at home.  On 6/17/2025, HgA1C was 5.9.     Symptoms: Has occasional fluttering of the heart.  Denies chest pain, dyspnea, MELISSA, orthopnea, PND, syncope, lower extremity edema, claudication, lightheadedness, and dizziness.   Associated Symptoms: Weight down 1 pound in the past 6 weeks.  No fatigue, headaches, polyuria, polydipsia, arthralgias, myalgias, visual impairment, memory loss, or concentration issues.      Lifestyle: She consumes a diverse and healthy diet. She walks 2-3 times per week, and is active overall.   Tobacco Use: Former smoker.     Current Outpatient Medications on File Prior to Visit   Medication Sig Dispense Refill    acetaminophen (TYLENOL) 500 MG tablet Take 2 " tablets by mouth 2 (Two) Times a Day As Needed for Mild Pain.      aspirin 81 MG EC tablet Take 1 tablet by mouth Every Other Day. (Patient taking differently: Take 1 tablet by mouth Daily.)      atorvastatin (LIPITOR) 10 MG tablet Take 1 tablet by mouth Every Night. 30 tablet 5    calcium citrate-vitamin d (CALCITRATE) 315-250 MG-UNIT tablet tablet Take 2 tablets by mouth Daily.      Docusate Calcium (STOOL SOFTENER PO) 2 capsules Daily.      famotidine (PEPCID) 20 MG tablet TAKE 1 TABLET BY MOUTH TWICE A  tablet 1    FLUoxetine (PROzac) 20 MG capsule TAKE 1 CAPSULE BY MOUTH EVERY DAY 30 capsule 11    Glucos-Chondroit-Hyaluron-MSM (GLUCOSAMINE CHONDROITIN JOINT PO) 2 tablets Daily.      hydroCHLOROthiazide 25 MG tablet Take 1 tablet by mouth Daily. 30 tablet 1    MAGNESIUM GLYCINATE PO Take 500 mg by mouth Daily.      montelukast (SINGULAIR) 10 MG tablet Take 1 tablet by mouth Daily. 30 tablet 11    Multiple Vitamins-Minerals (MULTI VITAMIN/MINERALS PO) Take  by mouth daily.      OMEGA 3-6-9 FATTY ACIDS PO Take  by mouth.      Probiotic Product (ALIGN) 4 MG capsule 1 capsule Daily.      Semaglutide-Weight Management (Wegovy) 0.5 MG/0.5ML solution auto-injector Inject 0.5 mL under the skin into the appropriate area as directed 1 (One) Time Per Week. 2 mL 0    traZODone (DESYREL) 50 MG tablet 1-2 po qhs prn sleep 60 tablet 5    Turmeric 500 MG capsule Take 1 capsule by mouth Daily.      [DISCONTINUED] amLODIPine (NORVASC) 10 MG tablet TAKE 1 TABLET BY MOUTH EVERY DAY 90 tablet 3     No current facility-administered medications on file prior to visit.       Current outpatient and discharge medications have been reconciled for the patient.  Reviewed by: Eun Mcmanus MD        The following portions of the patient's history were reviewed and updated as appropriate: allergies, current medications, past family history, past medical history, past social history, past surgical history, and problem list.    Review  "of Systems   Constitutional:  Negative for fatigue and unexpected weight change.   Eyes:  Negative for visual disturbance.   Respiratory:  Negative for cough, shortness of breath and wheezing.    Cardiovascular:  Positive for palpitations. Negative for chest pain and leg swelling.        No MELISSA, orthopnea, or claudication.   Gastrointestinal:  Positive for nausea. Negative for abdominal pain, blood in stool, constipation, diarrhea and vomiting.        Denies melena.   Endocrine: Negative for polydipsia, polyphagia and polyuria.   Musculoskeletal:  Negative for arthralgias and myalgias.   Neurological:  Negative for dizziness, syncope, light-headedness and headaches.        No memory issues.   Psychiatric/Behavioral:  Negative for decreased concentration.          Objective       Blood pressure 126/76, pulse 66, temperature 98.1 °F (36.7 °C), resp. rate 20, height 162 cm (63.78\"), weight 90.8 kg (200 lb 3.2 oz), SpO2 98%, not currently breastfeeding.  Body mass index is 34.6 kg/m².      Physical Exam  Vitals and nursing note reviewed.   Constitutional:       Comments: BMI greater than 30   HENT:      Mouth/Throat:      Comments: There is a flat appearing white circular lesion on the right tongue, seems unchanged from last visit  Neck:      Vascular: No carotid bruit.   Cardiovascular:      Rate and Rhythm: Normal rate and regular rhythm.      Heart sounds: Normal heart sounds. No murmur heard.  Pulmonary:      Effort: Pulmonary effort is normal.      Breath sounds: Normal breath sounds.   Neurological:      Mental Status: She is alert.   Psychiatric:         Mood and Affect: Mood normal.         Assessment / Plan:  Diagnoses and all orders for this visit:    1. Dyslipidemia (Primary)  -     Lipid Panel  -     Comprehensive Metabolic Panel   Continue current medication(s) as noted in the history of present illness.    2. Obesity (BMI 30-39.9)  -     Lipid Panel  -     Comprehensive Metabolic Panel   Continue current " medication(s) as noted in the history of present illness.    3. Primary hypertension  -     amLODIPine (NORVASC) 10 MG tablet; Take 1 tablet by mouth Daily.  Dispense: 90 tablet; Refill: 3- REFILL   Continue current medication(s) as noted in the history of present illness.    4. Lesion of tonsil  -     Ambulatory Referral to ENT (Otolaryngology)    5. High risk medication use  -     Comprehensive Metabolic Panel                   Return in about 2 months (around 9/28/2025) for Recheck Prediabetes, fasting (after 9/17/2025).

## 2025-07-29 LAB
ALBUMIN SERPL-MCNC: 4.4 G/DL (ref 3.5–5.2)
ALBUMIN/GLOB SERPL: 1.6 G/DL
ALP SERPL-CCNC: 60 U/L (ref 39–117)
ALT SERPL W P-5'-P-CCNC: 31 U/L (ref 1–33)
ANION GAP SERPL CALCULATED.3IONS-SCNC: 14.1 MMOL/L (ref 5–15)
AST SERPL-CCNC: 26 U/L (ref 1–32)
BILIRUB SERPL-MCNC: 0.4 MG/DL (ref 0–1.2)
BUN SERPL-MCNC: 13 MG/DL (ref 8–23)
BUN/CREAT SERPL: 16.9 (ref 7–25)
CALCIUM SPEC-SCNC: 9.4 MG/DL (ref 8.6–10.5)
CHLORIDE SERPL-SCNC: 105 MMOL/L (ref 98–107)
CHOLEST SERPL-MCNC: 151 MG/DL (ref 0–200)
CO2 SERPL-SCNC: 22.9 MMOL/L (ref 22–29)
CREAT SERPL-MCNC: 0.77 MG/DL (ref 0.57–1)
EGFRCR SERPLBLD CKD-EPI 2021: 86.8 ML/MIN/1.73
GLOBULIN UR ELPH-MCNC: 2.8 GM/DL
GLUCOSE SERPL-MCNC: 111 MG/DL (ref 65–99)
HDLC SERPL-MCNC: 55 MG/DL (ref 40–60)
LDLC SERPL CALC-MCNC: 71 MG/DL (ref 0–100)
LDLC/HDLC SERPL: 1.23 {RATIO}
POTASSIUM SERPL-SCNC: 3.6 MMOL/L (ref 3.5–5.2)
PROT SERPL-MCNC: 7.2 G/DL (ref 6–8.5)
SODIUM SERPL-SCNC: 142 MMOL/L (ref 136–145)
TRIGL SERPL-MCNC: 142 MG/DL (ref 0–150)
VLDLC SERPL-MCNC: 25 MG/DL (ref 5–40)

## 2025-08-11 DIAGNOSIS — I10 PRIMARY HYPERTENSION: ICD-10-CM

## 2025-08-11 DIAGNOSIS — E66.9 OBESITY (BMI 30-39.9): ICD-10-CM

## 2025-08-11 RX ORDER — SEMAGLUTIDE 0.5 MG/.5ML
0.5 INJECTION, SOLUTION SUBCUTANEOUS WEEKLY
Qty: 2 ML | Refills: 0 | Status: SHIPPED | OUTPATIENT
Start: 2025-08-11

## 2025-08-11 RX ORDER — HYDROCHLOROTHIAZIDE 25 MG/1
25 TABLET ORAL DAILY
Qty: 30 TABLET | Refills: 1 | Status: SHIPPED | OUTPATIENT
Start: 2025-08-11

## 2025-08-12 DIAGNOSIS — I10 PRIMARY HYPERTENSION: ICD-10-CM

## 2025-08-12 RX ORDER — HYDROCHLOROTHIAZIDE 25 MG/1
25 TABLET ORAL DAILY
Qty: 30 TABLET | Refills: 1 | OUTPATIENT
Start: 2025-08-12

## 2025-08-15 ENCOUNTER — OFFICE VISIT (OUTPATIENT)
Dept: PULMONOLOGY | Facility: CLINIC | Age: 65
End: 2025-08-15
Payer: COMMERCIAL

## 2025-08-15 VITALS
DIASTOLIC BLOOD PRESSURE: 90 MMHG | HEART RATE: 71 BPM | WEIGHT: 196 LBS | HEIGHT: 64 IN | SYSTOLIC BLOOD PRESSURE: 142 MMHG | TEMPERATURE: 96.2 F | OXYGEN SATURATION: 97 % | BODY MASS INDEX: 33.46 KG/M2

## 2025-08-15 DIAGNOSIS — Z78.9 NON-SMOKER: ICD-10-CM

## 2025-08-15 DIAGNOSIS — J30.2 SEASONAL AND PERENNIAL ALLERGIC RHINITIS: ICD-10-CM

## 2025-08-15 DIAGNOSIS — R91.8 LUNG NODULES: Primary | ICD-10-CM

## 2025-08-15 DIAGNOSIS — J30.89 SEASONAL AND PERENNIAL ALLERGIC RHINITIS: ICD-10-CM

## 2025-08-15 DIAGNOSIS — R91.1 LUNG NODULE, SOLITARY: ICD-10-CM

## 2025-08-15 PROCEDURE — 99214 OFFICE O/P EST MOD 30 MIN: CPT | Performed by: NURSE PRACTITIONER

## 2025-08-16 ENCOUNTER — RESULTS FOLLOW-UP (OUTPATIENT)
Dept: INTERNAL MEDICINE | Facility: CLINIC | Age: 65
End: 2025-08-16
Payer: COMMERCIAL